# Patient Record
Sex: MALE | Race: ASIAN | NOT HISPANIC OR LATINO | Employment: UNEMPLOYED | URBAN - METROPOLITAN AREA
[De-identification: names, ages, dates, MRNs, and addresses within clinical notes are randomized per-mention and may not be internally consistent; named-entity substitution may affect disease eponyms.]

---

## 2017-03-07 ENCOUNTER — RECORDS - HEALTHEAST (OUTPATIENT)
Dept: LAB | Facility: CLINIC | Age: 47
End: 2017-03-07

## 2017-03-07 LAB
CHOLEST SERPL-MCNC: 191 MG/DL
FASTING STATUS PATIENT QL REPORTED: ABNORMAL
HDLC SERPL-MCNC: 31 MG/DL
LDLC SERPL CALC-MCNC: 94 MG/DL
LDLC SERPL CALC-MCNC: ABNORMAL MG/DL
TRIGL SERPL-MCNC: 487 MG/DL

## 2018-01-12 ASSESSMENT — MIFFLIN-ST. JEOR: SCORE: 1340.74

## 2018-01-13 ENCOUNTER — SURGERY - HEALTHEAST (OUTPATIENT)
Dept: SURGERY | Facility: CLINIC | Age: 48
End: 2018-01-13

## 2018-01-13 ENCOUNTER — ANESTHESIA - HEALTHEAST (OUTPATIENT)
Dept: SURGERY | Facility: CLINIC | Age: 48
End: 2018-01-13

## 2018-01-13 ASSESSMENT — MIFFLIN-ST. JEOR: SCORE: 1348.88

## 2018-01-14 ASSESSMENT — MIFFLIN-ST. JEOR: SCORE: 1341.19

## 2018-01-15 ASSESSMENT — MIFFLIN-ST. JEOR: SCORE: 1366.88

## 2018-01-16 ASSESSMENT — MIFFLIN-ST. JEOR: SCORE: 1384.88

## 2018-01-17 ASSESSMENT — MIFFLIN-ST. JEOR: SCORE: 1374.88

## 2018-01-18 ASSESSMENT — MIFFLIN-ST. JEOR: SCORE: 1374.31

## 2018-01-19 ASSESSMENT — MIFFLIN-ST. JEOR: SCORE: 1338.88

## 2018-01-20 ASSESSMENT — MIFFLIN-ST. JEOR: SCORE: 1318.88

## 2018-01-21 ASSESSMENT — MIFFLIN-ST. JEOR: SCORE: 1319.88

## 2018-01-22 ASSESSMENT — MIFFLIN-ST. JEOR: SCORE: 1331.67

## 2018-01-23 ENCOUNTER — HOME CARE/HOSPICE - HEALTHEAST (OUTPATIENT)
Dept: HOME HEALTH SERVICES | Facility: HOME HEALTH | Age: 48
End: 2018-01-23

## 2018-01-23 ENCOUNTER — AMBULATORY - HEALTHEAST (OUTPATIENT)
Dept: CARDIOLOGY | Facility: CLINIC | Age: 48
End: 2018-01-23

## 2018-01-23 DIAGNOSIS — T14.8XXA INTRAMURAL HEMATOMA: ICD-10-CM

## 2018-01-23 ASSESSMENT — MIFFLIN-ST. JEOR: SCORE: 1319.42

## 2018-01-25 ENCOUNTER — HOME CARE/HOSPICE - HEALTHEAST (OUTPATIENT)
Dept: HOME HEALTH SERVICES | Facility: HOME HEALTH | Age: 48
End: 2018-01-25

## 2018-02-03 ENCOUNTER — HOSPITAL ENCOUNTER (INPATIENT)
Facility: CLINIC | Age: 48
LOS: 13 days | Discharge: HOME | End: 2018-02-16
Attending: SURGERY | Admitting: SURGERY
Payer: COMMERCIAL

## 2018-02-03 ENCOUNTER — APPOINTMENT (OUTPATIENT)
Dept: GENERAL RADIOLOGY | Facility: CLINIC | Age: 48
End: 2018-02-03
Attending: SURGERY
Payer: COMMERCIAL

## 2018-02-03 DIAGNOSIS — I71.019 AORTIC DISSECTION, THORACIC (H): Primary | ICD-10-CM

## 2018-02-03 DIAGNOSIS — I71.9 PENETRATING ULCER OF AORTA (H): ICD-10-CM

## 2018-02-03 LAB
ABO + RH BLD: NORMAL
ABO + RH BLD: NORMAL
ANION GAP SERPL CALCULATED.3IONS-SCNC: 10 MMOL/L (ref 3–14)
APTT PPP: 34 SEC (ref 22–37)
BLD GP AB SCN SERPL QL: NORMAL
BLOOD BANK CMNT PATIENT-IMP: NORMAL
BUN SERPL-MCNC: 21 MG/DL (ref 7–30)
CALCIUM SERPL-MCNC: 8.8 MG/DL (ref 8.5–10.1)
CHLORIDE SERPL-SCNC: 108 MMOL/L (ref 94–109)
CO2 SERPL-SCNC: 18 MMOL/L (ref 20–32)
CREAT SERPL-MCNC: 0.65 MG/DL (ref 0.66–1.25)
ERYTHROCYTE [DISTWIDTH] IN BLOOD BY AUTOMATED COUNT: 12.3 % (ref 10–15)
GFR SERPL CREATININE-BSD FRML MDRD: >90 ML/MIN/1.7M2
GLUCOSE BLDC GLUCOMTR-MCNC: 144 MG/DL (ref 70–99)
GLUCOSE BLDC GLUCOMTR-MCNC: 155 MG/DL (ref 70–99)
GLUCOSE BLDC GLUCOMTR-MCNC: 157 MG/DL (ref 70–99)
GLUCOSE SERPL-MCNC: 152 MG/DL (ref 70–99)
HBA1C MFR BLD: 8.4 % (ref 4.3–6)
HCT VFR BLD AUTO: 36.5 % (ref 40–53)
HGB BLD-MCNC: 12 G/DL (ref 13.3–17.7)
INR PPP: 1.02 (ref 0.86–1.14)
MAGNESIUM SERPL-MCNC: 1.8 MG/DL (ref 1.6–2.3)
MCH RBC QN AUTO: 31.5 PG (ref 26.5–33)
MCHC RBC AUTO-ENTMCNC: 32.9 G/DL (ref 31.5–36.5)
MCV RBC AUTO: 96 FL (ref 78–100)
MRSA DNA SPEC QL NAA+PROBE: NEGATIVE
PHOSPHATE SERPL-MCNC: 3.4 MG/DL (ref 2.5–4.5)
PLATELET # BLD AUTO: 229 10E9/L (ref 150–450)
POTASSIUM SERPL-SCNC: 3.7 MMOL/L (ref 3.4–5.3)
RBC # BLD AUTO: 3.81 10E12/L (ref 4.4–5.9)
SODIUM SERPL-SCNC: 136 MMOL/L (ref 133–144)
SPECIMEN EXP DATE BLD: NORMAL
SPECIMEN SOURCE: NORMAL
WBC # BLD AUTO: 11.1 10E9/L (ref 4–11)

## 2018-02-03 PROCEDURE — 71045 X-RAY EXAM CHEST 1 VIEW: CPT

## 2018-02-03 PROCEDURE — 20000004 ZZH R&B ICU UMMC

## 2018-02-03 PROCEDURE — 93005 ELECTROCARDIOGRAM TRACING: CPT

## 2018-02-03 PROCEDURE — 93010 ELECTROCARDIOGRAM REPORT: CPT | Performed by: INTERNAL MEDICINE

## 2018-02-03 PROCEDURE — 86900 BLOOD TYPING SEROLOGIC ABO: CPT | Performed by: SURGERY

## 2018-02-03 PROCEDURE — 87641 MR-STAPH DNA AMP PROBE: CPT | Performed by: SURGERY

## 2018-02-03 PROCEDURE — 80048 BASIC METABOLIC PNL TOTAL CA: CPT | Performed by: SURGERY

## 2018-02-03 PROCEDURE — 25000125 ZZHC RX 250: Performed by: STUDENT IN AN ORGANIZED HEALTH CARE EDUCATION/TRAINING PROGRAM

## 2018-02-03 PROCEDURE — 83735 ASSAY OF MAGNESIUM: CPT | Performed by: SURGERY

## 2018-02-03 PROCEDURE — 36620 INSERTION CATHETER ARTERY: CPT | Mod: GC | Performed by: SURGERY

## 2018-02-03 PROCEDURE — 85610 PROTHROMBIN TIME: CPT | Performed by: SURGERY

## 2018-02-03 PROCEDURE — 25000132 ZZH RX MED GY IP 250 OP 250 PS 637: Performed by: STUDENT IN AN ORGANIZED HEALTH CARE EDUCATION/TRAINING PROGRAM

## 2018-02-03 PROCEDURE — 87640 STAPH A DNA AMP PROBE: CPT | Performed by: SURGERY

## 2018-02-03 PROCEDURE — 25000131 ZZH RX MED GY IP 250 OP 636 PS 637: Performed by: SURGERY

## 2018-02-03 PROCEDURE — 85730 THROMBOPLASTIN TIME PARTIAL: CPT | Performed by: SURGERY

## 2018-02-03 PROCEDURE — 25000125 ZZHC RX 250: Performed by: SURGERY

## 2018-02-03 PROCEDURE — 84100 ASSAY OF PHOSPHORUS: CPT | Performed by: SURGERY

## 2018-02-03 PROCEDURE — 83036 HEMOGLOBIN GLYCOSYLATED A1C: CPT | Performed by: SURGERY

## 2018-02-03 PROCEDURE — 00000146 ZZHCL STATISTIC GLUCOSE BY METER IP

## 2018-02-03 PROCEDURE — 85027 COMPLETE CBC AUTOMATED: CPT | Performed by: SURGERY

## 2018-02-03 PROCEDURE — 25800025 ZZH RX 258: Performed by: SURGERY

## 2018-02-03 PROCEDURE — 86901 BLOOD TYPING SEROLOGIC RH(D): CPT | Performed by: SURGERY

## 2018-02-03 PROCEDURE — 86850 RBC ANTIBODY SCREEN: CPT | Performed by: SURGERY

## 2018-02-03 PROCEDURE — 36415 COLL VENOUS BLD VENIPUNCTURE: CPT | Performed by: SURGERY

## 2018-02-03 PROCEDURE — 99221 1ST HOSP IP/OBS SF/LOW 40: CPT | Mod: 25 | Performed by: SURGERY

## 2018-02-03 PROCEDURE — 25000128 H RX IP 250 OP 636: Performed by: SURGERY

## 2018-02-03 PROCEDURE — 25000132 ZZH RX MED GY IP 250 OP 250 PS 637: Performed by: SURGERY

## 2018-02-03 RX ORDER — METFORMIN HCL 500 MG
2000 TABLET, EXTENDED RELEASE 24 HR ORAL
COMMUNITY
End: 2023-06-19

## 2018-02-03 RX ORDER — ONDANSETRON 2 MG/ML
4 INJECTION INTRAMUSCULAR; INTRAVENOUS EVERY 6 HOURS PRN
Status: DISCONTINUED | OUTPATIENT
Start: 2018-02-03 | End: 2018-02-16 | Stop reason: HOSPADM

## 2018-02-03 RX ORDER — POTASSIUM CHLORIDE 7.45 MG/ML
10 INJECTION INTRAVENOUS
Status: DISCONTINUED | OUTPATIENT
Start: 2018-02-03 | End: 2018-02-03

## 2018-02-03 RX ORDER — ONDANSETRON 4 MG/1
4 TABLET, ORALLY DISINTEGRATING ORAL EVERY 6 HOURS PRN
Status: DISCONTINUED | OUTPATIENT
Start: 2018-02-03 | End: 2018-02-16 | Stop reason: HOSPADM

## 2018-02-03 RX ORDER — DEXTROSE MONOHYDRATE, SODIUM CHLORIDE, AND POTASSIUM CHLORIDE 50; 1.49; 4.5 G/1000ML; G/1000ML; G/1000ML
INJECTION, SOLUTION INTRAVENOUS CONTINUOUS
Status: DISCONTINUED | OUTPATIENT
Start: 2018-02-03 | End: 2018-02-04

## 2018-02-03 RX ORDER — NICOTINE POLACRILEX 4 MG
15-30 LOZENGE BUCCAL
Status: DISCONTINUED | OUTPATIENT
Start: 2018-02-03 | End: 2018-02-16 | Stop reason: HOSPADM

## 2018-02-03 RX ORDER — POTASSIUM CHLORIDE 750 MG/1
20-40 TABLET, EXTENDED RELEASE ORAL
Status: DISCONTINUED | OUTPATIENT
Start: 2018-02-03 | End: 2018-02-16 | Stop reason: HOSPADM

## 2018-02-03 RX ORDER — DEXTROSE MONOHYDRATE 25 G/50ML
25-50 INJECTION, SOLUTION INTRAVENOUS
Status: DISCONTINUED | OUTPATIENT
Start: 2018-02-03 | End: 2018-02-16 | Stop reason: HOSPADM

## 2018-02-03 RX ORDER — ESMOLOL HYDROCHLORIDE 10 MG/ML
200 INJECTION INTRAVENOUS ONCE
Status: COMPLETED | OUTPATIENT
Start: 2018-02-03 | End: 2018-02-03

## 2018-02-03 RX ORDER — ACETAMINOPHEN 325 MG/1
650 TABLET ORAL EVERY 4 HOURS PRN
Status: DISCONTINUED | OUTPATIENT
Start: 2018-02-03 | End: 2018-02-16 | Stop reason: HOSPADM

## 2018-02-03 RX ORDER — POTASSIUM CHLORIDE 1.5 G/1.58G
20-40 POWDER, FOR SOLUTION ORAL
Status: DISCONTINUED | OUTPATIENT
Start: 2018-02-03 | End: 2018-02-16 | Stop reason: HOSPADM

## 2018-02-03 RX ORDER — MAGNESIUM SULFATE HEPTAHYDRATE 40 MG/ML
4 INJECTION, SOLUTION INTRAVENOUS EVERY 4 HOURS PRN
Status: DISCONTINUED | OUTPATIENT
Start: 2018-02-03 | End: 2018-02-16 | Stop reason: HOSPADM

## 2018-02-03 RX ORDER — POTASSIUM CL/LIDO/0.9 % NACL 10MEQ/0.1L
10 INTRAVENOUS SOLUTION, PIGGYBACK (ML) INTRAVENOUS
Status: DISCONTINUED | OUTPATIENT
Start: 2018-02-03 | End: 2018-02-16 | Stop reason: HOSPADM

## 2018-02-03 RX ORDER — NALOXONE HYDROCHLORIDE 0.4 MG/ML
.1-.4 INJECTION, SOLUTION INTRAMUSCULAR; INTRAVENOUS; SUBCUTANEOUS
Status: DISCONTINUED | OUTPATIENT
Start: 2018-02-03 | End: 2018-02-16 | Stop reason: HOSPADM

## 2018-02-03 RX ORDER — PROCHLORPERAZINE MALEATE 5 MG
10 TABLET ORAL EVERY 6 HOURS PRN
Status: DISCONTINUED | OUTPATIENT
Start: 2018-02-03 | End: 2018-02-16 | Stop reason: HOSPADM

## 2018-02-03 RX ORDER — HEPARIN SODIUM 5000 [USP'U]/.5ML
5000 INJECTION, SOLUTION INTRAVENOUS; SUBCUTANEOUS EVERY 8 HOURS
Status: DISCONTINUED | OUTPATIENT
Start: 2018-02-03 | End: 2018-02-16 | Stop reason: HOSPADM

## 2018-02-03 RX ORDER — HYDROMORPHONE HCL/0.9% NACL/PF 0.2MG/0.2
0.2 SYRINGE (ML) INTRAVENOUS
Status: DISCONTINUED | OUTPATIENT
Start: 2018-02-03 | End: 2018-02-08

## 2018-02-03 RX ORDER — PROCHLORPERAZINE 25 MG
25 SUPPOSITORY, RECTAL RECTAL EVERY 12 HOURS PRN
Status: DISCONTINUED | OUTPATIENT
Start: 2018-02-03 | End: 2018-02-16 | Stop reason: HOSPADM

## 2018-02-03 RX ORDER — AMLODIPINE BESYLATE 10 MG/1
10 TABLET ORAL DAILY
Status: DISCONTINUED | OUTPATIENT
Start: 2018-02-03 | End: 2018-02-16 | Stop reason: HOSPADM

## 2018-02-03 RX ORDER — ESMOLOL HYDROCHLORIDE 20 MG/ML
50-300 INJECTION, SOLUTION INTRAVENOUS CONTINUOUS
Status: DISCONTINUED | OUTPATIENT
Start: 2018-02-03 | End: 2018-02-04

## 2018-02-03 RX ORDER — POTASSIUM CHLORIDE 29.8 MG/ML
20 INJECTION INTRAVENOUS
Status: DISCONTINUED | OUTPATIENT
Start: 2018-02-03 | End: 2018-02-16 | Stop reason: HOSPADM

## 2018-02-03 RX ADMIN — POTASSIUM CHLORIDE, DEXTROSE MONOHYDRATE AND SODIUM CHLORIDE: 150; 5; 450 INJECTION, SOLUTION INTRAVENOUS at 09:40

## 2018-02-03 RX ADMIN — ESMOLOL HYDROCHLORIDE 300 MCG/KG/MIN: 20 INJECTION INTRAVENOUS at 20:13

## 2018-02-03 RX ADMIN — ESMOLOL HYDROCHLORIDE 50 MCG/KG/MIN: 20 INJECTION INTRAVENOUS at 08:52

## 2018-02-03 RX ADMIN — ESMOLOL HYDROCHLORIDE 300 MCG/KG/MIN: 20 INJECTION INTRAVENOUS at 22:07

## 2018-02-03 RX ADMIN — INSULIN ASPART 1 UNITS: 100 INJECTION, SOLUTION INTRAVENOUS; SUBCUTANEOUS at 15:58

## 2018-02-03 RX ADMIN — ACETAMINOPHEN 650 MG: 325 TABLET, FILM COATED ORAL at 19:44

## 2018-02-03 RX ADMIN — POTASSIUM CHLORIDE, DEXTROSE MONOHYDRATE AND SODIUM CHLORIDE: 150; 5; 450 INJECTION, SOLUTION INTRAVENOUS at 19:47

## 2018-02-03 RX ADMIN — FLUOXETINE HYDROCHLORIDE 20 MG: 10 CAPSULE ORAL at 12:01

## 2018-02-03 RX ADMIN — ESMOLOL HYDROCHLORIDE 13000 MCG: 10 INJECTION, SOLUTION INTRAVENOUS at 08:53

## 2018-02-03 RX ADMIN — ESMOLOL HYDROCHLORIDE 300 MCG/KG/MIN: 20 INJECTION INTRAVENOUS at 14:54

## 2018-02-03 RX ADMIN — DILTIAZEM HYDROCHLORIDE 15 MG/HR: 5 INJECTION INTRAVENOUS at 19:47

## 2018-02-03 RX ADMIN — Medication 0.2 MG: at 15:55

## 2018-02-03 RX ADMIN — INSULIN ASPART 1 UNITS: 100 INJECTION, SOLUTION INTRAVENOUS; SUBCUTANEOUS at 12:01

## 2018-02-03 RX ADMIN — ESMOLOL HYDROCHLORIDE 300 MCG/KG/MIN: 20 INJECTION INTRAVENOUS at 18:35

## 2018-02-03 RX ADMIN — Medication 2 G: at 13:49

## 2018-02-03 RX ADMIN — DILTIAZEM HYDROCHLORIDE 5 MG/HR: 5 INJECTION INTRAVENOUS at 11:52

## 2018-02-03 RX ADMIN — AMLODIPINE BESYLATE 10 MG: 10 TABLET ORAL at 09:26

## 2018-02-03 RX ADMIN — ESMOLOL HYDROCHLORIDE 300 MCG/KG/MIN: 20 INJECTION INTRAVENOUS at 17:11

## 2018-02-03 RX ADMIN — Medication 0.2 MG: at 12:08

## 2018-02-03 RX ADMIN — POTASSIUM CHLORIDE 20 MEQ: 750 TABLET, EXTENDED RELEASE ORAL at 13:47

## 2018-02-03 RX ADMIN — Medication 0.2 MG: at 09:26

## 2018-02-03 RX ADMIN — ESMOLOL HYDROCHLORIDE 300 MCG/KG/MIN: 20 INJECTION INTRAVENOUS at 13:23

## 2018-02-03 RX ADMIN — ESMOLOL HYDROCHLORIDE 300 MCG/KG/MIN: 20 INJECTION INTRAVENOUS at 11:23

## 2018-02-03 RX ADMIN — INSULIN ASPART 1 UNITS: 100 INJECTION, SOLUTION INTRAVENOUS; SUBCUTANEOUS at 19:44

## 2018-02-03 RX ADMIN — HEPARIN SODIUM 5000 UNITS: 5000 INJECTION, SOLUTION INTRAVENOUS; SUBCUTANEOUS at 21:44

## 2018-02-03 RX ADMIN — HEPARIN SODIUM 5000 UNITS: 5000 INJECTION, SOLUTION INTRAVENOUS; SUBCUTANEOUS at 13:47

## 2018-02-03 ASSESSMENT — PAIN DESCRIPTION - DESCRIPTORS
DESCRIPTORS: ACHING
DESCRIPTORS: ACHING

## 2018-02-03 NOTE — PROGRESS NOTES
D/I:?Patient on unit 4A Surgical/Neuro ICU admit from [facility]   Neuro- a&Ox4; PERRLA; no deficits noted. Spontaneous & purposeful movement to all extrems. On strict bedrest at this time.   CV- SR 60s-70s; titrating diltiazem & esmolol to maintain SBP <110 & HR <60- per orders-per vascular note goal SBP <120 & HR in 60-70 range  Pulm- lungs CTA on RA; denies SOB @ rest  GI- NPO-sips w/ meds; abdomen somewhat distended; mild tenderness  - continent; adequate UOP;   Gtts- dilt gtt; esmolol gtt; dex 5% w/ 1/2 NS 20 K @ 100 ml/hr  Skin- intact  Lines- R-PIV; L-PIV; R radial ART line  Drains- N/A  Pain- continues to c/o CP; mild abdominal pain; EKG WNL; prn IV dilaudid appears effective; prn oral tylenol available  See flow sheets for further interventions and assessments.   A: Stable. Electros replaced per protocol; no acute changes since pt arrival. SBPs mostly 100s-125; HR 60-70s. Pt oriented to room and POC. No surgery at this time  P:?Continue to monitor pt closely. Notify MD of significant changes. Continue to monitor BP & HR. Possibly advance diet tomorrow to CL?  If BP & HR remains controlled.  to come at 8 A.M. tomorrow to assist w/ admission questions.

## 2018-02-03 NOTE — PROGRESS NOTES
Pt arrived from [facility] d/t worsening type B thoracic aortic dissection. Primarily Hmong speaking. Does speak some english.   called and at bedside. Plan of care explained to pt. No surgery at this time-initiating BP control SBP <110 and HR <60. Pt has no questions regarding POC at this time. Pt arrived w/ underwear and songs. No other belongings on pt. EKG WNL. CXray ordered. C/O chest/abdominal pain.

## 2018-02-03 NOTE — PHARMACY-CONSULT NOTE
Admission medication history interview status for the 2/3/2018 admission is complete. See Epic admission navigator for allergy information, pharmacy, prior to admission medications and immunization status.     Medication history interview sources:  Care Everywhere medications and records, [facility] [phone]    Changes made to PTA medication list (reason)  Added: all in list below    Additional medication history information (including reliability of information, actions taken by pharmacist):    The patient reported he did not know his medications but he fills at [facility] in [facility].  Based on a review of his CareEverywhere he was discharged from [facility] after an admission 1/12/18-1/23/18 for aortic dissection with hypertensive emergency.  Medication compliance was questioned at that time.    He was discharged on:  Labetalol 600 mg po TID  Amlodipine 10 mg po TID  Lisinopril 20 mg BID  HCTZ 25 mg daily  Metformin ER 1500 mg daily    His pharmacy shows records for:  Fluoxetine 20 mg daily - filled 1/29/18 for a 30 day supply  Atenolol 50 mg daily - filled 11/20/17 for a 30 day supply ---> NO record for a fill of the new Rx for labetalol  NO record for a fill of the new Rx for amlodipine  Lisinopril 40 mg daily  HCTZ 12.5 mg daily  Metformin ER 2000 mg daily    PTA medication list updated to reflect what he should have been taking at discharge on 1/23/18 with no adjustments made to the metformin dose and the new Rx for fluoxetine added.      Prior to Admission medications    Medication Sig Last Dose Taking? Auth Provider   FLUOXETINE HCL PO Take 20 mg by mouth daily  Yes Unknown, Entered By History   LISINOPRIL PO Take 20 mg by mouth 2 times daily  Yes Unknown, Entered By History   HYDROCHLOROTHIAZIDE PO Take 25 mg by mouth daily  Yes Unknown, Entered By History   metFORMIN (GLUCOPHAGE-XR) 500 MG 24 hr tablet Take 2,000 mg by mouth daily (with dinner)  Yes Unknown, Entered By History   LABETALOL  HCL PO Take 600 mg by mouth 3 times daily  at Never started at home Yes Unknown, Entered By History   AMLODIPINE BESYLATE PO Take 10 mg by mouth daily  at Never started at home Yes Unknown, Entered By History         Medication history completed by:   Svetlana Coon, PharmD  pager [phone]

## 2018-02-03 NOTE — IP AVS SNAPSHOT
[address] [address]    Phone:  [phone]                                       After Visit Summary   2/3/2018    Tiffany Lenz    MRN: [id number]           After Visit Summary Signature Page     I have received my discharge instructions, and my questions have been answered. I have discussed any challenges I see with this plan with the nurse or doctor.    ..........................................................................................................................................  Patient/Patient Representative Signature      ..........................................................................................................................................  Patient Representative Print Name and Relationship to Patient    ..................................................               ................................................  Date                                            Time    ..........................................................................................................................................  Reviewed by Signature/Title    ...................................................              ..............................................  Date                                                            Time

## 2018-02-03 NOTE — IP AVS SNAPSHOT
MRN:[id number]                      After Visit Summary   2/3/2018    Tiffany Lenz    MRN: [id number]           Thank you!     Thank you for choosing [facility] for your care. Our goal is always to provide you with excellent care. Hearing back from our patients is one way we can continue to improve our services. Please take a few minutes to complete the written survey that you may receive in the mail after you visit with us. Thank you!        Patient Information     Date Of Birth          1970        Designated Caregiver       Most Recent Value    Caregiver    Will someone help with your care after discharge? 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  For non-urgent questions about your medical care, please call your primary care provider or clinic, [phone]  For questions related to your surgery, please call your surgery clinic        Attending Provider     Provider Derrick Rausch MD Vascular Surgery    Monisha Monique MD Vascular Surgery       Primary Care Provider Office Phone # Fax #    Lindsey Ames [phone]       When to contact your care team       Please keep record of your blood pressure.  Systolic blood pressure goal (top number) under 120.  Heart rate goal 60.  Please check blood pressure and heart rate twice a day            When to contact your care team       Contact Dr. Monique's vascular surgery team if any increased pain, swelling, fever or incision drainage develop.                  After Care Instructions     Activity       Your activity upon discharge: no heavy lifting for 2 weeks nothing over 10 lbs            Diet        Follow this diet upon discharge:      Regular Diet Adult            Wound care and dressings       Instructions to care for your wound at home: okay to keep bilateral groin incisions open to air                  Follow-up Appointments     Follow Up and recommended labs and tests       Follow up with Dr. Monique in one month with CT scan    With questions, concerns, or to request an appointment, please call either:    Melissa Warren, Care Coordinator RN, Vascular Surgery  [phone]    Vascular Call Center  [phone]    To contact someone after 5 pm, on a weekend, or on a Holiday, please call:  [facility]  [phone], option 4 to have a member of the Vascular Surgery Service paged.                  Your next 10 appointments already scheduled     Mar 22, 2018  1:00 PM CDT   (Arrive by 12:45 PM)   CT CHEST/ABD/PELVIS ANGIO W PROCESSING with UUCT4   [facility] [place], [place] ([facility], [facility])    [address] [address] [phone]           Please bring any scans or X-rays taken at other hospitals, if similar tests were done. Also bring a list of your medicines, including vitamins, minerals and over-the-counter drugs. It is safest to leave personal items at home.  Be sure to tell your doctor:   If you have any allergies.   If there s any chance you are pregnant.   If you are breastfeeding.    If you have diabetes as your medication may need to be adjusted for this exam.  You will have contrast for this exam. To prepare:   Do not eat or drink for 2 hours before your exam. If you need to take medicine, you may take it with small sips of water. (We may ask you to take liquid medicine as well.)   The day before your exam, drink extra fluids at least six 8-ounce glasses (unless your doctor tells you to restrict your fluids).  Patients over 70 or patients with diabetes or kidney problems:   If you haven t had a blood test  "(creatinine test) within the last 30 days, the Cardiologist/Radiologist may require you to get this test prior to your exam.  Please wear loose clothing, such as a sweat suit or jogging clothes. Avoid snaps, zippers and other metal. We may ask you to undress and put on a hospital gown.  If you have any questions, please call the Imaging Department where you will have your exam.            Mar 22, 2018  2:00 PM CDT   (Arrive by 1:45 PM)   New Vascular Visit with Monisha Monique MD   [facility] ([facility])    [address] [place] [address]  3rd [facility] [address] [phone]              Additional Services     CARDIAC REHAB REFERRAL       *This therapy referral will be filtered to a centralized scheduling office at [facility] and the patient will receive a call to schedule an appointment at a [place] location most convenient for them.*     If you have not heard from the scheduling office within 2 business days, please call [phone] for all locations.    Please be aware that coverage of these services is subject to the terms and limitations of your health insurance plan.  Call member services at your health plan with any benefit or coverage questions.      **Note to Provider:  If you are referring outside of [place] for the therapy appointment, please list the name of the location in the \"special instructions\" above, print the referral and give to the patient to schedule the appointment.                         Future tests that were ordered for you     CTA Angiogram Chest/Abd/Pelvis w Processing       Status post TEVAR for penetrating aortic ulcer                  Pending Results     No orders found from 2/1/2018 to 2/4/2018.            Statement of Approval     Ordered          02/15/18 0910  I have reviewed and agree with all the recommendations and orders detailed in this document.  EFFECTIVE NOW     Approved and electronically signed by:  " "Holly Howard, BYRON CNS             Admission Information     Date & Time Provider Department Dept. Phone    2/3/2018 Monisha Monique MD [address] [facility] [phone]      Your Vitals Were     Blood Pressure Pulse Temperature Respirations Height Weight    110/60 80 95.8  F (35.4  C) (Axillary) 16 1.402 m (4' 7.2\") 53 kg (116 lb 12.8 oz)    Pulse Oximetry BMI (Body Mass Index)                97% 26.95 kg/m2          Updater Information     Updater lets you send messages to your doctor, view your test results, renew your prescriptions, schedule appointments and more. To sign up, go to [email or web address] . Click on \"Log in\" on the left side of the screen, which will take you to the Welcome page. Then click on \"Sign up Now\" on the right side of the page.     You will be asked to enter the access code listed below, as well as some personal information. Please follow the directions to create your username and password.     Your access code is: [email or web address]  Expires: 2018 11:05 AM     Your access code will  in 90 days. If you need help or a new code, please call your [place] clinic or [phone].        Care EveryWhere ID     This is your Care EveryWhere ID. This could be used by other organizations to access your [place] medical records  [id number]        Equal Access to Services     MARLY SIDDIQI AH: Hadii everett Farias, waaxda luqadaha, qaybta kaalmada johnathanyada, leonidas hatch. So [facility] [phone].    ATENCIÓN: Si habla español, tiene a carroll disposición servicios gratuitos de asistencia lingüística. Llame al [phone].    We comply with applicable federal civil rights laws and [place] laws. We do not discriminate on the basis of race, color, national origin, age, disability, sex, sexual orientation, or gender identity.               Review of your medicines      START taking        Dose / Directions    diltiazem 240 MG 24 hr capsule        Dose:  " 240 mg   Take 1 capsule (240 mg) by mouth daily   Quantity:  30 capsule   Refills:  0         CONTINUE these medicines which may have CHANGED, or have new prescriptions. If we are uncertain of the size of tablets/capsules you have at home, strength may be listed as something that might have changed.        Dose / Directions    lisinopril 10 MG tablet   Commonly known as:  PRINIVIL/ZESTRIL   This may have changed:    - medication strength  - how much to take        Dose:  10 mg   Take 1 tablet (10 mg) by mouth 2 times daily   Quantity:  30 tablet   Refills:  0         CONTINUE these medicines which have NOT CHANGED        Dose / Directions    AMLODIPINE BESYLATE PO        Dose:  10 mg   Take 10 mg by mouth daily   Refills:  0       FLUOXETINE HCL PO        Dose:  20 mg   Take 20 mg by mouth daily   Refills:  0       HYDROCHLOROTHIAZIDE PO        Dose:  25 mg   Take 25 mg by mouth daily   Refills:  0       LABETALOL HCL PO        Dose:  600 mg   Take 600 mg by mouth 3 times daily   Refills:  0       metFORMIN 500 MG 24 hr tablet   Commonly known as:  GLUCOPHAGE-XR        Dose:  2000 mg   Take 2,000 mg by mouth daily (with dinner)   Refills:  0            Where to get your medicines      These medications were sent to [facility] [place], [place] - [address] [place] [address]  [address] [facility] [address]     Phone:  [phone]     diltiazem 240 MG 24 hr capsule    lisinopril 10 MG tablet                Protect others around you: Learn how to safely use, store and throw away your medicines at www.disposemymeds.org.             Medication List: This is a list of all your medications and when to take them. Check marks below indicate your daily home schedule. Keep this list as a reference.      Medications           Morning Afternoon Evening Bedtime As Needed    AMLODIPINE BESYLATE PO   Take 10 mg by mouth daily   Last time this was given:  10 mg on 2/15/2018  8:43 AM                                 diltiazem 240 MG 24 hr capsule   Take 1 capsule (240 mg) by mouth daily   Last time this was given:  240 mg on 2/15/2018  8:43 AM                                FLUOXETINE HCL PO   Take 20 mg by mouth daily   Last time this was given:  20 mg on 2/15/2018  8:31 AM                                HYDROCHLOROTHIAZIDE PO   Take 25 mg by mouth daily   Last time this was given:  25 mg on 2/15/2018  8:31 AM                                LABETALOL HCL PO   Take 600 mg by mouth 3 times daily   Last time this was given:  600 mg on 2/15/2018  1:43 PM                                lisinopril 10 MG tablet   Commonly known as:  PRINIVIL/ZESTRIL   Take 1 tablet (10 mg) by mouth 2 times daily   Last time this was given:  10 mg on 2/15/2018  8:31 AM                                metFORMIN 500 MG 24 hr tablet   Commonly known as:  GLUCOPHAGE-XR   Take 2,000 mg by mouth daily (with dinner)

## 2018-02-03 NOTE — H&P
VASCULAR SURGERY STAFF ADMIT NOTE      Date of Service: 2/3/2018      Patient Summary:  Tiffany Lenz is a 47 year old year old transferred to [facility] from [facility] worsening type B thoracic aortic dissection      ASSESSMENT AND RECOMMENDATIONS:   47-year-old gentleman with worsening type B thoracic aortic dissection / IMH  By CT scans over the last 2-3 weeks, there has been some worsening of the dissection with some aneurysmal change and worsening penetrating atheromatous ulcer.  Prior to admission, unclear whether patient was on adequate medical therapy for his type B dissection.  On Physical exam, no evidence of malperfusion, Pain currently resolved on esmolol drip  Admission to ICU, IV beta blocker for anti-impulse blood pressure control. Goal systolic blood pressure 120 or under with heart rate in the 60-70 range  We'll need outside CT scans push to our radiology system for further review       CHIEF COMPLAINT:    worsening type B thoracic aortic dissection    HISTORY OF PRESENT ILLNESS:    Tiffany Lenz is a 47 year old year old male transferred to [facility] from [facility] worsening type B thoracic aortic dissection. Review of imaging from outside hospital shows CT scan of the chest abdomen and pelvis dated 1/13/2018 performed for chest back and abdominal pain. This scan showed a thoracic aortic dissection with a thrombosed false lumen extending from left subclavian artery through the abdominal aorta. There are multiple CT scans from the same institution dated 1/15/2018, 1/22/2018, 1/25/2018, an 2/3/2018. By report, patient was recommended for transfer and admission to [facility] during previous admission but the patient refused. Patient returned to the emergency department at [facility] today. Today's CT scan was concerning for evolving and increasing acute penetrating atheromatous ulcer with increasing type B aortic dissection as well as increasing aneurysmal dilatation of the thoracic aorta. By  report this was associated with continued symptoms of chest and abdominal pain. At this point, patient is agreeable to transfer to [facility]. Patient admitted to ICU.    On further history, patient poor historian in regards to BP control as well as blood sugar control    Reports DM on oral agents           Medical/Surgical History :    No past medical history on file.  No past surgical history on file.      Psychosocial History:  Social History     Social History     Marital status: Single     Spouse name: N/A     Number of children: N/A     Years of education: N/A     Occupational History     Not on file.     Social History Main Topics     Smoking status: Not on file     Smokeless tobacco: Not on file     Alcohol use Not on file     Drug use: Not on file     Sexual activity: Not on file     Other Topics Concern     Not on file     Social History Narrative         Family History:  No family history on file.      CURRENT HEALTH STATUS    Medications:     Current Facility-Administered Medications   Medication     esmolol 2000 mg in sodium chloride 0.9% 100 mL infusion     naloxone (NARCAN) injection 0.1-0.4 mg     dextrose 5% and 0.45% NaCl + KCl 20 mEq/L infusion     acetaminophen (TYLENOL) tablet 650 mg    Or     acetaminophen (TYLENOL) solution 650 mg     HYDROmorphone (DILAUDID) injection 0.2 mg     ondansetron (ZOFRAN-ODT) ODT tab 4 mg    Or     ondansetron (ZOFRAN) injection 4 mg     prochlorperazine (COMPAZINE) injection 10 mg    Or     prochlorperazine (COMPAZINE) tablet 10 mg    Or     prochlorperazine (COMPAZINE) Suppository 25 mg     glucose 40 % gel 15-30 g    Or     dextrose 50 % injection 25-50 mL    Or     glucagon injection 1 mg     insulin aspart (NovoLOG) inj (RAPID ACTING)     amLODIPine (NORVASC) tablet 10 mg     diltiazem (CARDIZEM) 125 mg in NaCl 0.9 % 125 mL infusion     FLUoxetine (PROzac) capsule 20 mg     heparin sodium PF injection 5,000 Units                    Allergies and drug reactions:       "            No Known Allergies      REVIEW OF SYSTEMS:  Review of Systems   Negative except for smoking, DM and current dissection sx      PHYSICAL EXAMINATIONS:  /83  Pulse 78  Temp 98.2  F (36.8  C) (Oral)  Ht 1.448 m (4' 9\")  Wt 64.7 kg (142 lb 10.2 oz)  SpO2 95%  BMI 30.87 kg/m2      General: WNWD in NAD    HEENT: PERRL, EOMI, orpharynx clear    Neck: Supple, Bruits: None    Respiratory: Clear to Auscutation     Cardiac: RRR, S1S2    Gastrointestinal: Soft, Non distended, non tender. Aorta not/ palpable. Some back pain from upper back to mid back    Peripheral vascular:     Right      Left  Radial  2+     2+  Femoral 2+     2+  DP  2+     2+   PT  2+     2+        Extremities:   Right leg: no wounds  Left Leg: no wounds      Neurological: CN 2-12 intact, TORRES, Grossly non focal      REVIEW OF LABORATORY, PATHOLOGY, AND RADIOLOGY DATA:    Lab results:     Recent Labs   Lab Test  02/03/18   0908   NA  136   CHLORIDE  108   CO2  18*   CR  0.65*     Recent Labs   Lab Test  02/03/18   0908   HGB  12.0*   WBC  11.1*   PLT  229     @RESULREFR(pt,aptt,inr)@         X-ray results: CTA from OSH today personally reviewed        ECG results:NSR    Other:  none        Primary care physician: (automatically links in the PCP listed in patient record)    Other significant physician provider(s): Lindsey Ames MD  Vascular Surgery Staff  "

## 2018-02-03 NOTE — LETTER
Transition Communication Hand-off for Care Transitions to Next Level of Care Provider    Name: Tiffany Lenz  MRN #: [id number]  Primary Care Provider: Lindsey Ames     Primary Clinic: [facility] [address] [place] [facility] [address]     Reason for Hospitalization:  Hx of repair of dissecting thoracic aortic aneurysm, Flanagan type B [Z98.890, Z86.79]  Admit Date/Time: 2/3/2018  8:21 AM  Discharge Date: 2/16/2018  Payor Source: Payor: BLUE PLUS / Plan: BLUE PLUS MA / Product Type: HMO /          Reason for Communication Hand-off Referral: Other continuity of care    Discharge Plan: Discharged to home with family assist and plan for clinic f/u     Discharge Needs Assessment:  Needs       Most Recent Value    Equipment Currently Used at Home none    Transportation Available taxi        Follow-up plan:  Future Appointments  Date Time Provider Department Center   2/17/2018 8:00 AM [place] LANGU[facility]   2/18/2018 8:00 AM [place] [facility]   2/19/2018 8:00 AM Milady Sorenson [facility]   2/20/2018 8:00 AM [facility]   2/21/2018 8:00 AM Milady Sorenson [facility]   2/27/2018 9:30 AM 1, Ur Cardiac Re[facility]   3/22/2018 1:00 PM [id number] [place] [address]   3/22/2018 2:00 PM Monisha Monique MD [facility]       Any outstanding tests or procedures:        Radiology & Cardiology Orders     Future Labs/Procedures Complete By Expires    CTA Angiogram Chest/Abd/Pelvis w Processing  2/15/2018 (Approximate) 2/15/2019    Process Instructions:    Administration of IV contrast (contrast agent, dose, and amount) will be tailored to this examination per the appropriate written protocol listed in the Protocol E-Book, or by the supervising imaging provider.     Comments:    Status post TEVAR for penetrating aortic ulcer        Referrals     Future Labs/Procedures    CARDIAC REHAB REFERRAL     Comments:    *This therapy  "referral will be filtered to a centralized scheduling office at [facility] and the patient will receive a call to schedule an appointment at a [facility] location most convenient for them.*     If you have not heard from the scheduling office within 2 business days, please call [phone] for all locations.    Please be aware that coverage of these services is subject to the terms and limitations of your health insurance plan.  Call member services at your health plan with any benefit or coverage questions.      **Note to Provider:  If you are referring outside of [facility] for the therapy appointment, please list the name of the location in the \"special instructions\" above, print the referral and give to the patient to schedule the appointment.                  Pallavi Lepe, NEELCC  [phone]    AVS/Discharge Summary is the source of truth; this is a helpful guide for improved communication of patient story          "

## 2018-02-03 NOTE — PROCEDURES
Arterial Line Procedure Note    DATE OF PROCEDURE: 2/3/2018    PREOPERATIVE DIAGNOSIS: Descending thoracic aneurysm     POSTOPERATIVE DIAGNOSIS: Same    OPERATION PERFORMED: radial line placement    ANESTHESIA: Local    COMPLICATIONS: none    INDICATIONS FOR PROCEDURE:   The patient is a 47 year old male with type B aortic aneurysm, who requires an arterial line for invasive BP monitoring.    DESCRIPTION OF OPERATIVE PROCEDURE:   Pre-procedure consent for the procedure was obtained.  The patient was prepped and draped in the usual sterile manner. An Jona's test was performed to confirm adequate collateral blood flow.  Lidocaine was used to anesthetize the region. The right radial artery was palpated and successfully cannulated on the first pass. Pulsatile, arterial blood was visualized and the artery was then threaded with a guidewire using the Seldinger technique and a catheter was then inserted sutured into place. A good wave-form was obtained. The patient tolerated the procedure well without any immediate complications. The area was cleaned and sterile dressing was applied. Dr Figueroa is the supervising surgeon and was available for assistance.    DISPOSITION: stable, OK to use arterial line    Lainey Haro MD  General Surgery Resident

## 2018-02-03 NOTE — H&P
"       SURGICAL ICU H&P  February 3, 2018      CO-MORBIDITIES:   No diagnosis found.    ASSESSMENT/Plan:   Tiffany Lenz is a 47 year old male with history of HTN and known tupe B aortic dissection who is admitted with worsening aortic dissection.     Neuro/ pain/ sedation:  - Neuro intact currently, will continue to monitor neurological status. Notify the MD for any acute changes in exam.  - tylenol PRN, dilaudid PRN for pain.      Pulmonary care:   - Currently without work of breathing, with normal O2 saturation. Supplemental oxygen to keep saturation above 92 %.    Cardiovascular:    -Type B thoracic aortic dissection: vascular surgery primary. Per outside report: \"acute penetrating atheromatous ulcer with increasing type B aortic dissection and intramural hematoma as well as associated increasing 4.4 cm aneurysmal dilation of the thoracic aorta. Also supraceliac penetrating atheromatous ulcer with associated intramural hematoma and 4.1 x 3.8 cm infrarenal abdominal aortic aneurysm\"  Will use esmolol infusion keep BP less than 110, HR less than 60.   Will add diltiaziam drip to achieve goal SBP and HR     GI care: No issues. Will keep NPO for now. May start CLD later today if SBP is better controlled     Fluids/ Electrolytes/ Nutrition:   - will start D5 1/2 NS + 20 KCL at 100 while NPO  - Monitor electrolytes and replete as needed.   - No indication for parenteral nutrition.    Renal/ Fluid Balance:    - Will continue to monitor intake and output.    Endocrine:    - Sliding scale for diabetes management.   -  Will switch to insulin gtt if unable to achieve good control     ID/ Antibiotics:  - No indication for antibiotics.    Heme:     - Hemoglobin stable.     Prophylaxis:    - Mechanical prophylaxis for DVT.   - Hep SC TID    Lines/ tubes/ drains:  - PIVx2  - Rt radial art line    Disposition:  - Surgical ICU.     Patient seen, findings and plan discussed with surgical ICU staff Henry.    Lainey Haro MD  General " Surgery, PGY-2  [phone]      - - - - - - - - - - - - - - - - - - - - - - - - - - - - - - - - - - - - - - - - - - - - - - - - - - - - - - - - - - - - - - - - - - - - - - - -     PRIMARY TEAM: Vascular Surgery  PRIMARY PHYSICIAN: Dr. Ziegler    REASON FOR CRITICAL CARE ADMISSION: Close hemodynamic monitoring      ADMITTING PHYSICIAN: Dr. Ziegler     HISTORY PRESENTING ILLNESS:  Tiffany Lenz is a 47 year old year old male transferred to [facility] from [facility] worsening type B thoracic aortic dissection. Review of imaging from outside hospital shows CT scan of the chest abdomen and pelvis dated 1/13/2018 performed for chest back and abdominal pain. This scan showed a thoracic aortic dissection with a thrombosed false lumen extending from left subclavian artery through the abdominal aorta. Patient was advised to come to [facility] for further cares but he refused. He presented back to the ED with worsening chest pain radiating to the back. Repeat CT scan today was concerning for evolving and increasing acute penetrating atheromatous ulcer with increasing type B aortic dissection as well as increasing aneurysmal dilatation of the thoracic aorta. Patient denies lower extremities weakness or numbness, no abdominal pain, UOP has been unchanged.     REVIEW OF SYSTEMS: As noted above.     PAST MEDICAL HISTORY:   HTN  DMII     SURGICAL HISTORY:   None    SOCIAL HISTORY: Tobacco - smoking since 1991. Unemployed.     FAMILY HISTORY: Noncontributory     ALLERGIES:    NKDA    MEDICATIONS:    No current facility-administered medications on file prior to encounter.   No current outpatient prescriptions on file prior to encounter.    PHYSICAL EXAMINATION:  Temp:  [98.1  F (36.7  C)] 98.1  F (36.7  C)  Pulse:  [78] 78  BP: (134-136)/(85-91) 136/91  General: Alert, well-appearing in no acute distress.  HEENT: Normocephalic, atraumatic. Patent nares.   Respiratory: Non-labored breathing. Lung sounds clear to auscultation bilaterally.    Cardiovascular: Regular rate and rhythm. No murmurs.   Gastrointestinal: Abdomen soft, non-distended, non-tender to palpation.   Extremities: Moving all four extremities. No limb deformities. No pedal edema. Palpable DP pulses bilaterally.   Skin: As noted above. No rashes or lesions appreciated.    LABS: Reviewed.   Complete Blood Count     Recent Labs  Lab 02/03/18  0908   WBC 11.1*   HGB 12.0*        Basic Metabolic Panel    Recent Labs  Lab 02/03/18  0908      POTASSIUM 3.7   CHLORIDE 108   CO2 18*   BUN 21   CR 0.65*   *     Liver Function Tests    Recent Labs  Lab 02/03/18  0908   INR 1.02   Coagulation Profile    Recent Labs  Lab 02/03/18  0908   INR 1.02   PTT 34       IMAGING:  CTA Chest Abdomen Pelvis2/3/2018  [facility]  Result Impression   CONCLUSION:  1.  Evolving, increasing acute penetrating atheromatous ulcer with increasing type B aortic dissection and intramural hematoma as well as associated increasing 4.4 cm aneurysmal dilation of the thoracic aorta.  2.  Supraceliac penetrating atheromatous ulcer with associated intramural hematoma, decreased in prominence/size in comparison with prior study.  3.  4.1 x 3.8 cm infrarenal abdominal aortic aneurysm.  4.  Mildly enlarged heart.  5.  Renal cysts.    Findings were discussed with Dr. Campos at 6:21 AM on 02/03/2018.   Result Narrative   CTA CHEST ABDOMEN PELVIS  02/03/2018, 5:48 AM    INDICATION: Dissection and aneurysm history. Evaluate for aortic dissection.  TECHNIQUE: Multiphase helical acquisition through the chest, abdomen, and pelvis was performed including noncontrast, arterial phase, and delayed images before and after the administration of IV contrast. 2D and 3D reconstructions were performed by the   CT technologist. Dose reduction techniques were used.   IV CONTRAST: Iohexol (Omni) 100 mL.  COMPARISON: 01/25/2018.    FINDINGS:  CT ANGIOGRAM CHEST, ABDOMEN, AND PELVIS: Type B aortic dissection with  increasing penetrating atheromatous ulcer. The atheromatous ulcer is seen at the level of the mid thoracic artery and now measures approximately 2.2 x 1.4 x 4.4 cm. The aortic   dissection flap and thrombus appears to be increasing as well. The mid thoracic descending aorta now measures 4.4 cm in length compared to 3.8 cm.    Decreased supraceliac penetrating ulcer with associated mural hematoma (3, 113). Consider correlation for underlying vasculitis/connective tissue disease.    Infrarenal abdominal aortic aneurysm measuring 3.8 x 4.1 cm.    Patent celiac, superior mesenteric, renal, and inferior mesenteric arteries.    CHEST:  LUNGS AND PLEURA: Dependent atelectasis.    MEDIASTINUM: Normal size heart. Pericardial effusion. No hilar or mediastinal lymphadenopathy.    ABDOMEN: Fatty liver. Normal adrenals, spleen, and pancreas. Small renal cysts. No hydroureteronephrosis. No obstructing renal or ureteral stone.     PELVIS: Normal bowel. Normal appendix.    MUSCULOSKELETAL: Negative.

## 2018-02-04 LAB
BUN SERPL-MCNC: 10 MG/DL (ref 7–30)
CALCIUM SERPL-MCNC: 7.8 MG/DL (ref 8.5–10.1)
CHLORIDE SERPL-SCNC: 110 MMOL/L (ref 94–109)
CO2 SERPL-SCNC: 16 MMOL/L (ref 20–32)
CREAT SERPL-MCNC: 0.73 MG/DL (ref 0.66–1.25)
GFR SERPL CREATININE-BSD FRML MDRD: >90 ML/MIN/1.7M2
GLUCOSE BLDC GLUCOMTR-MCNC: 121 MG/DL (ref 70–99)
GLUCOSE BLDC GLUCOMTR-MCNC: 151 MG/DL (ref 70–99)
GLUCOSE BLDC GLUCOMTR-MCNC: 155 MG/DL (ref 70–99)
GLUCOSE BLDC GLUCOMTR-MCNC: 161 MG/DL (ref 70–99)
GLUCOSE BLDC GLUCOMTR-MCNC: 166 MG/DL (ref 70–99)
GLUCOSE BLDC GLUCOMTR-MCNC: 192 MG/DL (ref 70–99)
GLUCOSE SERPL-MCNC: 172 MG/DL (ref 70–99)
MAGNESIUM SERPL-MCNC: 2.1 MG/DL (ref 1.6–2.3)
POTASSIUM SERPL-SCNC: 4.4 MMOL/L (ref 3.4–5.3)
SODIUM SERPL-SCNC: 138 MMOL/L (ref 133–144)

## 2018-02-04 PROCEDURE — 25000125 ZZHC RX 250: Performed by: SURGERY

## 2018-02-04 PROCEDURE — 40000275 ZZH STATISTIC RCP TIME EA 10 MIN

## 2018-02-04 PROCEDURE — 25000132 ZZH RX MED GY IP 250 OP 250 PS 637: Performed by: PHYSICIAN ASSISTANT

## 2018-02-04 PROCEDURE — 80048 BASIC METABOLIC PNL TOTAL CA: CPT | Performed by: STUDENT IN AN ORGANIZED HEALTH CARE EDUCATION/TRAINING PROGRAM

## 2018-02-04 PROCEDURE — 99291 CRITICAL CARE FIRST HOUR: CPT | Performed by: PHYSICIAN ASSISTANT

## 2018-02-04 PROCEDURE — 25000128 H RX IP 250 OP 636: Performed by: SURGERY

## 2018-02-04 PROCEDURE — 25000128 H RX IP 250 OP 636: Performed by: PHYSICIAN ASSISTANT

## 2018-02-04 PROCEDURE — 40000014 ZZH STATISTIC ARTERIAL MONITORING DAILY

## 2018-02-04 PROCEDURE — 25000132 ZZH RX MED GY IP 250 OP 250 PS 637: Performed by: SURGERY

## 2018-02-04 PROCEDURE — 25800025 ZZH RX 258: Performed by: SURGERY

## 2018-02-04 PROCEDURE — 83735 ASSAY OF MAGNESIUM: CPT | Performed by: STUDENT IN AN ORGANIZED HEALTH CARE EDUCATION/TRAINING PROGRAM

## 2018-02-04 PROCEDURE — 00000146 ZZHCL STATISTIC GLUCOSE BY METER IP

## 2018-02-04 PROCEDURE — 20000004 ZZH R&B ICU UMMC

## 2018-02-04 PROCEDURE — 25000125 ZZHC RX 250: Performed by: PHYSICIAN ASSISTANT

## 2018-02-04 RX ORDER — AMOXICILLIN 250 MG
1 CAPSULE ORAL 2 TIMES DAILY
Status: DISCONTINUED | OUTPATIENT
Start: 2018-02-04 | End: 2018-02-16 | Stop reason: HOSPADM

## 2018-02-04 RX ORDER — LABETALOL HYDROCHLORIDE 300 MG/1
150 TABLET, FILM COATED ORAL EVERY 12 HOURS SCHEDULED
Status: DISCONTINUED | OUTPATIENT
Start: 2018-02-04 | End: 2018-02-05

## 2018-02-04 RX ORDER — FUROSEMIDE 10 MG/ML
20 INJECTION INTRAMUSCULAR; INTRAVENOUS ONCE
Status: COMPLETED | OUTPATIENT
Start: 2018-02-04 | End: 2018-02-04

## 2018-02-04 RX ORDER — POLYETHYLENE GLYCOL 3350 17 G/17G
17 POWDER, FOR SOLUTION ORAL DAILY
Status: DISCONTINUED | OUTPATIENT
Start: 2018-02-04 | End: 2018-02-16 | Stop reason: HOSPADM

## 2018-02-04 RX ADMIN — HEPARIN SODIUM 5000 UNITS: 5000 INJECTION, SOLUTION INTRAVENOUS; SUBCUTANEOUS at 14:55

## 2018-02-04 RX ADMIN — LABETALOL HYDROCHLORIDE 5 MG/MIN: 5 INJECTION, SOLUTION INTRAVENOUS at 12:08

## 2018-02-04 RX ADMIN — Medication 150 MG: at 09:09

## 2018-02-04 RX ADMIN — LABETALOL HYDROCHLORIDE 3 MG/MIN: 5 INJECTION, SOLUTION INTRAVENOUS at 10:54

## 2018-02-04 RX ADMIN — FLUOXETINE HYDROCHLORIDE 20 MG: 10 CAPSULE ORAL at 08:37

## 2018-02-04 RX ADMIN — DILTIAZEM HYDROCHLORIDE 15 MG/HR: 5 INJECTION INTRAVENOUS at 02:30

## 2018-02-04 RX ADMIN — INSULIN ASPART 1 UNITS: 100 INJECTION, SOLUTION INTRAVENOUS; SUBCUTANEOUS at 08:38

## 2018-02-04 RX ADMIN — SENNOSIDES AND DOCUSATE SODIUM 1 TABLET: 8.6; 5 TABLET ORAL at 19:33

## 2018-02-04 RX ADMIN — ESMOLOL HYDROCHLORIDE 300 MCG/KG/MIN: 20 INJECTION INTRAVENOUS at 02:30

## 2018-02-04 RX ADMIN — INSULIN ASPART 1 UNITS: 100 INJECTION, SOLUTION INTRAVENOUS; SUBCUTANEOUS at 00:08

## 2018-02-04 RX ADMIN — ESMOLOL HYDROCHLORIDE 300 MCG/KG/MIN: 20 INJECTION INTRAVENOUS at 00:42

## 2018-02-04 RX ADMIN — HEPARIN SODIUM 5000 UNITS: 5000 INJECTION, SOLUTION INTRAVENOUS; SUBCUTANEOUS at 05:55

## 2018-02-04 RX ADMIN — SENNOSIDES AND DOCUSATE SODIUM 1 TABLET: 8.6; 5 TABLET ORAL at 10:54

## 2018-02-04 RX ADMIN — Medication 150 MG: at 19:32

## 2018-02-04 RX ADMIN — AMLODIPINE BESYLATE 10 MG: 10 TABLET ORAL at 08:37

## 2018-02-04 RX ADMIN — ESMOLOL HYDROCHLORIDE 300 MCG/KG/MIN: 20 INJECTION INTRAVENOUS at 06:42

## 2018-02-04 RX ADMIN — POTASSIUM CHLORIDE, DEXTROSE MONOHYDRATE AND SODIUM CHLORIDE: 150; 5; 450 INJECTION, SOLUTION INTRAVENOUS at 05:56

## 2018-02-04 RX ADMIN — INSULIN ASPART 1 UNITS: 100 INJECTION, SOLUTION INTRAVENOUS; SUBCUTANEOUS at 03:51

## 2018-02-04 RX ADMIN — DILTIAZEM HYDROCHLORIDE 10 MG/HR: 5 INJECTION INTRAVENOUS at 12:29

## 2018-02-04 RX ADMIN — FUROSEMIDE 20 MG: 10 INJECTION, SOLUTION INTRAVENOUS at 10:54

## 2018-02-04 RX ADMIN — HEPARIN SODIUM 5000 UNITS: 5000 INJECTION, SOLUTION INTRAVENOUS; SUBCUTANEOUS at 21:03

## 2018-02-04 RX ADMIN — POLYETHYLENE GLYCOL 3350 17 G: 17 POWDER, FOR SOLUTION ORAL at 10:54

## 2018-02-04 NOTE — PROGRESS NOTES
"Patient admitted for progression of penetrating aortic ulcer in the lower thoracic aorta associated with intermittent pain.  Unclear if the patient has been compliant with antihypertensive regimen at home.  Progression on multiple CT scans is impressive.  Also has a small abdominal aortic aneurysm, and this in conjunction with heme being , and young, effectively confirms that this is largely a genetic underlying problem.  In this context long-term degeneration of the remainder of his aorta is very possible.  Patient is currently pain-free on IV antihypertensives.  Volume of fluid being given hourly is too much my opinion.  High risk of these patients becoming fluid overloaded.    BP 93/56 (BP Location: Right arm)  Pulse 78  Temp 98.3  F (36.8  C) (Oral)  Ht 1.448 m (4' 9\")  Wt 64.7 kg (142 lb 10.2 oz)  SpO2 100%  BMI 30.87 kg/m2     Plan:  1.  Switch from esmolol to labetalol IV drip.  Target BP less than 120 systolic and heart rate less than 60.  2.  Start oral beta blocker and calcium channel blocker today as well.  3.  Will discuss with CT surgery and plan around potential for stent graft treatment of this lesion in the next few days.  "

## 2018-02-04 NOTE — PROGRESS NOTES
"  SURGICAL ICU PROGRESS NOTE  February 4, 2018          Date of Service (when I saw the patient): 02/04/2018    ASSESSMENT:  Tiffany Lenz is a 47 year old male who was admitted on 2/3/2018  For yupe B aortic dissection who is admitted with worsening aortic dissection. Past medical history notable for tobacco use, depression, Type II diabetes and hypertension with medication non-compliance.  Previously admitted at  [facility] from January 12, 2018 and left against medical advice (AMA) on January 23, 2018. Readmitted on 1/25/18 for hypertensive crisis and tachycardia after prompting of his primary PCP and then again left AMA on 1/26/18 after his symptoms resolved. Reported to have not taken any prescribed medications per OSH records since leaving hospital He returned to the ED with 2/3/18 for chest and back pain that started around 11:30 pm night prior where he was then transferred to [facility].      CHANGES FOR TODAY/MAJOR THINGS:   D/c IVF   Change esmolol to labetalol infusion   Start oral medications   Lasix 20 mg X 1 dose      Neuro/ pain/ sedation:  # Acute pain  # Depression   - Neuro intact currently, will continue to monitor neurological status. Notify the MD for any acute changes in exam.  - tylenol PRN, dilaudid PRN and oral oxycodone for main   - efforts to prevent hospital acquired delirium   - resume home anti-depressants      Pulmonary care:   # no issues   - Currently without work of breathing, with normal O2 saturation. Supplemental oxygen to keep saturation above 92 %.     Cardiovascular:    # Hypertension with hx of medication non-compliance   # Type B thoracic aortic dissection: vascular surgery primary. Per outside report: \"acute penetrating atheromatous ulcer with increasing type B aortic dissection and intramural hematoma as well as associated increasing 4.4 cm aneurysmal dilation of the thoracic aorta. Also supraceliac penetrating atheromatous ulcer with associated intramural hematoma and " "4.1 x 3.8 cm infrarenal abdominal aortic aneurysm\"  - PTA medications: HCTZ ,lisinopril, amlodipine, Labetalol all listed from time of d/c from [place]--doubt pt taking medications since his d/c per OSH records   - Images in [facility].   - Vascular following, Goal HR <60 and SBP < 120  - Oral labetalol, amlodipine, continue with Cardizem and Labetalol infusion     GI care:   # no issues   - CLD   - start bowel regimen today.      Fluids/ Electrolytes/ Nutrition:   # No issues   - Monitor electrolytes and replete as needed.   - No indication for parenteral nutrition or tube feeds.   - No need for PPI/H2 prophylaxis     Renal/ Fluid Balance:    - Will continue to monitor intake and output.  - Lasix 20 mg x1 dose today.   - creatinine stable.      Endocrine:    # diabetes mellitus   - Sliding scale for diabetes management.   - hold Metformin       ID/ Antibiotics:  - No indication for antibiotics.     Heme:     # no issues   - Hemoglobin stable.      General Cares and Prophylaxis:    - Mechanical prophylaxis for DVT and Heparin SC TID  - GI: no stress ulcer prophylaxis indicated      Lines/ tubes/ drains:  - PIVx2  - Right  radial art line     Disposition:  - Surgical ICU.     General Cares/Prophylaxis:    DVT Prophylaxis: Heparin SQ  GI Prophylaxis: Not indicated  Restraints: Restraints for medical healing needed: NO    Lines/ tubes/ drains:  - PIVs  - Arterial line     Disposition:  - Surgical ICU      Patient seen, findings and plan discussed with surgical ICU staff, Dr. Figueroa     Time spent on this Encounter   Billing:  I spent 30 minutes bedside and on the inpatient unit today managing the critical care of Tiffany Lenz in relation to the issues listed in this note.      Benedicto Lenz-Delta    ====================================    SUBJECTIVE:  Course reviewed. No acute events overnight. Denies chest, epigastric or back pain. Denies vision change, headaches, nausea or vomiting. Denies fever or chills, weakness or " numbness.     OBJECTIVE:   1. VITAL SIGNS:   Temp:  [97.6  F (36.4  C)-98.3  F (36.8  C)] 98.3  F (36.8  C)  Heart Rate:  [55-78] 60  BP: ()/(56-85) 93/56  MAP:  [70 mmHg-102 mmHg] 82 mmHg  Arterial Line BP: ()/(56-86) 113/70  SpO2:  [86 %-100 %] 100 %  No Data Recorded    2. INTAKE/ OUTPUT:   I/O last 3 completed shifts:  In: 2866.64 [I.V.:2866.64]  Out: 3670 [Urine:3670]    3. PHYSICAL EXAMINATION:   General: laying in bed, awake listening to music   HEENT: pupils equal. OP clear, tongue midline.   Neuro: A&Ox3, follows commands correctly and consistently   Pulm/Resp: Clear breath sounds bilaterally without rhonchi, crackles or wheeze,  breathing non-labored. On room air   CV: RRR, S1, S2   Abdomen: abdomen protuberant but soft and compressible, non-tender, no rebound tenderness or guarding, no masses  :no mark, no urine.   Incisions/Skin: skin warm and dry, no rashes   Extremities: TORRES. No  peripheral edema, moving all extremities, peripheral pulses intact 2+ in BLE and BUE. Right radial arterialline.     4. INVESTIGATIONS:   Arterial Blood Gases   No lab results found in last 7 days.  Complete Blood Count     Recent Labs  Lab 02/03/18  0908   WBC 11.1*   HGB 12.0*        Basic Metabolic Panel    Recent Labs  Lab 02/04/18  0342 02/03/18  0908    136   POTASSIUM 4.4 3.7   CHLORIDE 110* 108   CO2 16* 18*   BUN 10 21   CR 0.73 0.65*   * 152*     Liver Function Tests    Recent Labs  Lab 02/03/18  0908   INR 1.02     Pancreatic Enzymes  No lab results found in last 7 days.  Coagulation Profile    Recent Labs  Lab 02/03/18  0908   INR 1.02   PTT 34       5. RADIOLOGY:   No results found for this or any previous visit (from the past 24 hour(s)).    =========================================

## 2018-02-04 NOTE — PLAN OF CARE
"Problem: Patient Care Overview  Goal: Plan of Care/Patient Progress Review  Outcome: No Change  D: 47 year old year old with type B thoracic aortic dissection  I/A: Neuro intact. Strict bedrest. Diltiazem 15 mg/hr, esmolol 300 mcg/kg/min (max for each per order) for goal SBP < 110, goal HR < 60. Most of the night when pt at rest HR 55-65, SBP , HR increased briefly with cares and when patient repositioned self in bed. At 0500 HR trending up around 70 consistently, MD aware. R radial art line positional, correlating with cuff. Lungs clear, 2L NC between 4427-1800 for desat to 88%, otherwise O2 sats > 94% on RA. NPO except meds, using urinal at bedside with good UO, last BM \"three days ago\" per pt. Pt complained of back pain, expressed relief with Tylenol. Pt likes to listen to [place] music for relaxation. Please see flowsheets for for vitals and assessments.  P:  scheduled to come today at 0800. Advance to clear liquids if appropriate. Continue to monitor and treat as ordered. Offer support to patient and family as able.      "

## 2018-02-05 ENCOUNTER — OFFICE VISIT (OUTPATIENT)
Dept: INTERPRETER SERVICES | Facility: CLINIC | Age: 48
End: 2018-02-05
Payer: COMMERCIAL

## 2018-02-05 LAB
ANION GAP SERPL CALCULATED.3IONS-SCNC: 10 MMOL/L (ref 3–14)
BUN SERPL-MCNC: 9 MG/DL (ref 7–30)
CALCIUM SERPL-MCNC: 8.5 MG/DL (ref 8.5–10.1)
CHLORIDE SERPL-SCNC: 110 MMOL/L (ref 94–109)
CO2 SERPL-SCNC: 20 MMOL/L (ref 20–32)
CREAT SERPL-MCNC: 0.82 MG/DL (ref 0.66–1.25)
ERYTHROCYTE [DISTWIDTH] IN BLOOD BY AUTOMATED COUNT: 12.3 % (ref 10–15)
GFR SERPL CREATININE-BSD FRML MDRD: >90 ML/MIN/1.7M2
GLUCOSE BLDC GLUCOMTR-MCNC: 137 MG/DL (ref 70–99)
GLUCOSE BLDC GLUCOMTR-MCNC: 158 MG/DL (ref 70–99)
GLUCOSE BLDC GLUCOMTR-MCNC: 168 MG/DL (ref 70–99)
GLUCOSE BLDC GLUCOMTR-MCNC: 244 MG/DL (ref 70–99)
GLUCOSE SERPL-MCNC: 106 MG/DL (ref 70–99)
HCT VFR BLD AUTO: 34.3 % (ref 40–53)
HGB BLD-MCNC: 11.2 G/DL (ref 13.3–17.7)
INTERPRETATION ECG - MUSE: NORMAL
MAGNESIUM SERPL-MCNC: 1.8 MG/DL (ref 1.6–2.3)
MCH RBC QN AUTO: 30.5 PG (ref 26.5–33)
MCHC RBC AUTO-ENTMCNC: 32.7 G/DL (ref 31.5–36.5)
MCV RBC AUTO: 94 FL (ref 78–100)
PHOSPHATE SERPL-MCNC: 3.1 MG/DL (ref 2.5–4.5)
PLATELET # BLD AUTO: 214 10E9/L (ref 150–450)
POTASSIUM SERPL-SCNC: 3.5 MMOL/L (ref 3.4–5.3)
RBC # BLD AUTO: 3.67 10E12/L (ref 4.4–5.9)
SODIUM SERPL-SCNC: 141 MMOL/L (ref 133–144)
WBC # BLD AUTO: 7.8 10E9/L (ref 4–11)

## 2018-02-05 PROCEDURE — 00000146 ZZHCL STATISTIC GLUCOSE BY METER IP

## 2018-02-05 PROCEDURE — 80048 BASIC METABOLIC PNL TOTAL CA: CPT | Performed by: SURGERY

## 2018-02-05 PROCEDURE — 25000125 ZZHC RX 250: Performed by: STUDENT IN AN ORGANIZED HEALTH CARE EDUCATION/TRAINING PROGRAM

## 2018-02-05 PROCEDURE — 25000132 ZZH RX MED GY IP 250 OP 250 PS 637: Performed by: STUDENT IN AN ORGANIZED HEALTH CARE EDUCATION/TRAINING PROGRAM

## 2018-02-05 PROCEDURE — T1013 SIGN LANG/ORAL INTERPRETER: HCPCS | Mod: U3

## 2018-02-05 PROCEDURE — 99233 SBSQ HOSP IP/OBS HIGH 50: CPT | Mod: GC | Performed by: SURGERY

## 2018-02-05 PROCEDURE — 25000128 H RX IP 250 OP 636: Performed by: SURGERY

## 2018-02-05 PROCEDURE — 25000132 ZZH RX MED GY IP 250 OP 250 PS 637: Performed by: SURGERY

## 2018-02-05 PROCEDURE — 36415 COLL VENOUS BLD VENIPUNCTURE: CPT | Performed by: SURGERY

## 2018-02-05 PROCEDURE — 84100 ASSAY OF PHOSPHORUS: CPT | Performed by: SURGERY

## 2018-02-05 PROCEDURE — 25000128 H RX IP 250 OP 636: Performed by: PHYSICIAN ASSISTANT

## 2018-02-05 PROCEDURE — 86850 RBC ANTIBODY SCREEN: CPT | Performed by: STUDENT IN AN ORGANIZED HEALTH CARE EDUCATION/TRAINING PROGRAM

## 2018-02-05 PROCEDURE — 86900 BLOOD TYPING SEROLOGIC ABO: CPT | Performed by: STUDENT IN AN ORGANIZED HEALTH CARE EDUCATION/TRAINING PROGRAM

## 2018-02-05 PROCEDURE — 20000004 ZZH R&B ICU UMMC

## 2018-02-05 PROCEDURE — 83735 ASSAY OF MAGNESIUM: CPT | Performed by: SURGERY

## 2018-02-05 PROCEDURE — 86901 BLOOD TYPING SEROLOGIC RH(D): CPT | Performed by: STUDENT IN AN ORGANIZED HEALTH CARE EDUCATION/TRAINING PROGRAM

## 2018-02-05 PROCEDURE — 25000125 ZZHC RX 250: Performed by: PHYSICIAN ASSISTANT

## 2018-02-05 PROCEDURE — 85027 COMPLETE CBC AUTOMATED: CPT | Performed by: SURGERY

## 2018-02-05 RX ORDER — LABETALOL HYDROCHLORIDE 300 MG/1
150 TABLET, FILM COATED ORAL ONCE
Status: COMPLETED | OUTPATIENT
Start: 2018-02-05 | End: 2018-02-05

## 2018-02-05 RX ORDER — LABETALOL 300 MG/1
300 TABLET, FILM COATED ORAL EVERY 12 HOURS SCHEDULED
Status: DISCONTINUED | OUTPATIENT
Start: 2018-02-05 | End: 2018-02-05

## 2018-02-05 RX ORDER — LABETALOL 300 MG/1
300 TABLET, FILM COATED ORAL EVERY 8 HOURS SCHEDULED
Status: DISCONTINUED | OUTPATIENT
Start: 2018-02-05 | End: 2018-02-07

## 2018-02-05 RX ADMIN — DILTIAZEM HYDROCHLORIDE 12 MG/HR: 5 INJECTION INTRAVENOUS at 22:02

## 2018-02-05 RX ADMIN — POTASSIUM CHLORIDE 20 MEQ: 750 TABLET, EXTENDED RELEASE ORAL at 06:17

## 2018-02-05 RX ADMIN — DILTIAZEM HYDROCHLORIDE 15 MG/HR: 5 INJECTION INTRAVENOUS at 05:27

## 2018-02-05 RX ADMIN — DILTIAZEM HYDROCHLORIDE 13 MG/HR: 5 INJECTION INTRAVENOUS at 15:16

## 2018-02-05 RX ADMIN — Medication 2 G: at 06:15

## 2018-02-05 RX ADMIN — HEPARIN SODIUM 5000 UNITS: 5000 INJECTION, SOLUTION INTRAVENOUS; SUBCUTANEOUS at 05:27

## 2018-02-05 RX ADMIN — FLUOXETINE HYDROCHLORIDE 20 MG: 10 CAPSULE ORAL at 08:13

## 2018-02-05 RX ADMIN — HEPARIN SODIUM 5000 UNITS: 5000 INJECTION, SOLUTION INTRAVENOUS; SUBCUTANEOUS at 14:29

## 2018-02-05 RX ADMIN — HEPARIN SODIUM 5000 UNITS: 5000 INJECTION, SOLUTION INTRAVENOUS; SUBCUTANEOUS at 21:10

## 2018-02-05 RX ADMIN — AMLODIPINE BESYLATE 10 MG: 10 TABLET ORAL at 08:13

## 2018-02-05 RX ADMIN — LABETALOL HCL 300 MG: 300 TABLET, FILM COATED ORAL at 21:10

## 2018-02-05 RX ADMIN — Medication 150 MG: at 09:08

## 2018-02-05 RX ADMIN — Medication 150 MG: at 08:13

## 2018-02-05 NOTE — PROGRESS NOTES
VASCULAR SURGERY PROGRESS NOTE    SUBJECTIVE:  Patient sitting up in bed, denies any chest pain or back pain at present. Labetalol and Diltiazem restarted last night.    OBJECTIVE:  Vital signs:  Temp: 97.6  F (36.4  C) Temp src: Oral BP: 112/70   Heart Rate: 78 Resp: 16 SpO2: 98 % O2 Device: None (Room air)        Intake/Output Summary (Last 24 hours) at 02/05/18 0859  Last data filed at 02/05/18 0800   Gross per 24 hour   Intake           968.15 ml   Output             2630 ml   Net         -1661.85 ml       Vitals:    02/03/18 0800 02/04/18 0300 02/05/18 0200   Weight: 64.7 kg (142 lb 10.2 oz) 66.6 kg (146 lb 13.2 oz) 64.6 kg (142 lb 6.7 oz)       PHYSICAL EXAM:  NEURO/PSYCH: The patient is alert and oriented.  Appropriate.  Moves all extremities.    SKIN: warm and dry.  PULMONARY: non-labored breathing  EXTREMITIES: warm and well perfused, palpable bilateral PT and DP pulses     BMP  Recent Labs  Lab 02/05/18  0452 02/04/18  0342 02/03/18  0908    138 136   POTASSIUM 3.5 4.4 3.7   CHLORIDE 110* 110* 108   CO2 20 16* 18*   BUN 9 10 21   CR 0.82 0.73 0.65*   * 172* 152*   MAG 1.8 2.1 1.8   PHOS 3.1  --  3.4     CBC  Recent Labs  Lab 02/05/18  0452 02/03/18  0908   WBC 7.8 11.1*   HGB 11.2* 12.0*    229     INR/PTT  Recent Labs  Lab 02/03/18  0908   INR 1.02   PTT 34         ASSESSMENT:  47 year old male with type B thoracic aortic dissection, intramural hematoma     PLAN:  - continue target BP less than 120 systolic and heart rate less than 60  - wean labetalol and diltiazem as tolerated, need to avoid fluid overload    - plan for CT scan tomorrow   - once stable on oral BP medications, plan for stent graft treatment during this hospitalization within the next week       Holly MATTHEWS, CNS  Division of Vascular Surgery  [facility]  Pager [phone]

## 2018-02-05 NOTE — PLAN OF CARE
"Problem: Patient Care Overview  Goal: Plan of Care/Patient Progress Review  Outcome: No Change  Events: diltiazem weaned off, labetalol from 5 to off switched from esmolol.     Assessment: neuro intact appropriate no changes. CV- SR goal < 60, SBP goal < 110, maintained with gtt's off since . mIVF switched to tko. Resp- tolerating RA. LS clear. GI/- miralax/senna given, encouraged BM since LBM estimated . Tolerating clears. auop into urinal. Lasix for diuresis. Skin intact. Right radial AL intact.   Activity: independently repositions in bed.     Plan: stent soon.     Family history obtained: patient asked about any pertinent family history this morning with  present. Patient denies any history of vascular disease in the family, stating that his parents  of \"old age\" around 80 years old. No history of diabetes, heart disease, cancer, hyperlipidemia, connective tissue disease, or aneurysms. He is a currenty smoker. Patient encouraged to quit, educated on the importance of medication compliance with BP meds specifically, to monitor blood sugars, exercise, eat a heart healthy diet, and stop smoking.           "

## 2018-02-05 NOTE — PROGRESS NOTES
"  SURGICAL ICU PROGRESS NOTE  February 4, 2018          Date of Service (when I saw the patient): 02/05/2018    ASSESSMENT:  Tiffany Lenz is a 47 year old male who was admitted on 2/3/2018  For typre B aortic dissection who is admitted for worsening aortic dissection. Past medical history notable for tobacco use, depression, Type II diabetes and hypertension with medication non-compliance.  Previously admitted at [facility] from January 12, 2018 and left against medical advice (AMA) on January 23, 2018. Readmitted on 1/25/18 for hypertensive crisis and tachycardia after prompting of his primary PCP and then again left AMA on 1/26/18 after his symptoms resolved. Reported to have not taken any prescribed medications per OSH records since leaving hospital He returned to the ED with 2/3/18 for chest and back pain. He was admitted to the SICU for close blood pressure monitoring before placement of a stent.       CHANGES FOR TODAY/MAJOR THINGS:   - Increase Labetalol to 300mg BID  - Restart PTA Lisinopril this evening if BP stable  - Advance diet as tolerated   - Activity as tolerated   - Plan for CTA tomorrow per vascular surgery     Neuro/ pain/ sedation:  # Acute pain  # Depression   - Neuro intact , will continue to monitor. Notify the MD for any acute changes in exam.  - tylenol PRN, dilaudid PRN and oral oxycodone for pain   - efforts to prevent hospital acquired delirium   - resume home anti-depressants - Fluoxetine       Pulmonary care:   # no issues   - Currently without work of breathing, with normal O2 saturation. Supplemental oxygen to keep saturation above 92 %.     Cardiovascular:    # Hypertension with hx of medication non-compliance   # Type B thoracic aortic dissection: vascular surgery primary. Per outside report: \"acute penetrating atheromatous ulcer with increasing type B aortic dissection and intramural hematoma as well as associated increasing 4.4 cm aneurysmal dilation of the thoracic aorta. " "Also supraceliac penetrating atheromatous ulcer with associated intramural hematoma and 4.1 x 3.8 cm infrarenal abdominal aortic aneurysm\"  - Images in Isite.   - PTA medications: HCTZ ,lisinopril, amlodipine, Labetalol listed from d/c from [place]  - Vascular following, Goal HR <60 and SBP < 120  - Labetalol 300mg BID, PTA amlodipine  - Continue with Cardizem and Labetalol infusion   - Will consider starting PTA lisinopril tonight if BP stable     GI care:   # no issues   - ADAT  - bowel regimen     Fluids/ Electrolytes/ Nutrition:   # No issues   - Monitor electrolytes and replete as needed.   - No indication for parenteral nutrition or tube feeds.   - No need for PPI/H2 prophylaxis     Renal/ Fluid Balance:    - Will continue to monitor intake and output.  - Creatinine stable.      Endocrine:    # diabetes mellitus   - Sliding scale for diabetes management.   - Hold PTA Metformin       ID/ Antibiotics:  - No indication for antibiotics.     Heme:     # no issues   - Hemoglobin stable.      General Cares and Prophylaxis:    - Mechanical prophylaxis for DVT and Heparin SC TID  - GI: NO ppx indicated      Lines/ tubes/ drains:  - PIVx2     Disposition:  - Surgical ICU with plan to transition to floor when weaned from IV blood pressure medication     Patient seen, findings and plan discussed with surgical ICU staff, Dr. Panchito Woods Sessions  SICU Resident   [phone]    ====================================    SUBJECTIVE:  Course reviewed. No acute events overnight. Pain gone since BP controlled. Restarted on IV BP medications with heart rate in the mid 70s yesterday afternoon.  Patient denies chest, epigastric or back pain. Denies vision change, headaches, nausea or vomiting. Denies fever or chills, weakness or numbness.     OBJECTIVE:   1. VITAL SIGNS:   Temp:  [98.1  F (36.7  C)-98.5  F (36.9  C)] 98.1  F (36.7  C)  Heart Rate:  [60-82] 67  Resp:  [16-18] 16  BP: ()/(56-86) 113/71  MAP:  [69 " mmHg-161 mmHg] 104 mmHg  Arterial Line BP: ()/() 118/88  SpO2:  [92 %-100 %] 95 %  Resp: 16    2. INTAKE/ OUTPUT:   I/O last 3 completed shifts:  In: 1563.55 [P.O.:220; I.V.:1343.55]  Out: 2830 [Urine:2830]    3. PHYSICAL EXAMINATION:   General: laying in bed, awake listening to music   HEENT: pupils equal. OP clear, tongue midline.   Neuro: A&Ox3, follows commands correctly and consistently   Pulm/Resp: Clear breath sounds bilaterally without rhonchi, crackles or wheeze,  breathing non-labored. On room air   CV: RRR, S1, S2   Abdomen: abdomen protuberant but soft and compressible, non-tender, no rebound tenderness or guarding, no masses  : no mark  Incisions/Skin: skin warm and dry, no rashes   Extremities: TORRES. No  peripheral edema, moving all extremities, peripheral pulses intact 2+ in BLE and BUE.     4. INVESTIGATIONS:   Arterial Blood Gases   No lab results found in last 7 days.  Complete Blood Count     Recent Labs  Lab 02/05/18  0452 02/03/18  0908   WBC 7.8 11.1*   HGB 11.2* 12.0*    229     Basic Metabolic Panel    Recent Labs  Lab 02/05/18  0452 02/04/18  0342 02/03/18  0908    138 136   POTASSIUM 3.5 4.4 3.7   CHLORIDE 110* 110* 108   CO2 20 16* 18*   BUN 9 10 21   CR 0.82 0.73 0.65*   * 172* 152*     Liver Function Tests    Recent Labs  Lab 02/03/18  0908   INR 1.02     Pancreatic Enzymes  No lab results found in last 7 days.  Coagulation Profile    Recent Labs  Lab 02/03/18  0908   INR 1.02   PTT 34       5. RADIOLOGY:   No results found for this or any previous visit (from the past 24 hour(s)).    =========================================

## 2018-02-05 NOTE — PLAN OF CARE
Problem: Patient Care Overview  Goal: Plan of Care/Patient Progress Review  Outcome: No Change  D: 47 year old male with type B aortic dissection  I/A: Neuro intact. SBP goal < 120 until 0700, Labetalol restarted at 2 mg/min. Diltiazem restarted 0030, now 15 mg/hr for HR goal < 60. Since 0300 HR more consistently 60-70, HR briefly increases >70 when independently repositioning in bed and with cares, MD aware. Lung sounds clear, room air, incentive spirometer encouraged. Tolerating clears, c/o hunger, requesting sandwich. Adequate UO with urinal at bedside. Senna given, up to toilet with diarrhea x 1 this shift (first BM since 2/1 per pt report). Skin intact. Right radial art line not correlating, d/c'd. Please see flowsheets for for vitals and assessments.  P:  requested for this am. Stent soon. Continue to monitor and treat as ordered. Offer support to patient and family as able.

## 2018-02-05 NOTE — PLAN OF CARE
Problem: Patient Care Overview  Goal: Plan of Care/Patient Progress Review  Outcome: Improving  D/I/A:  Neuro: alert and oriented, hmong speaking, but able to communicate minimally with english.  here this morning while doctors rounded, no pain and SBA. Sitting in chair most of the day  CV: BP to keep systolic less than 120 but greater than 90, HR between 60-70. Controlled by dilt gtt and labatelol gtt. Labetalol turned off this AM around 1200, attempting to wean down on dilt gtt.   Resp: RA, LS clear  GI: No BM, Regular diet, tolerating well. BM meds held due to loose stool for night nurse. Needs help ordering food.   : Voiding bedside urinal.   Skin: intact.     Gtt: Dilt gtt @ 13ml/hr.   Labetaolol off  TKO    P:stent AAA next week, try to get off gtt and get to orals and then to floor until surgery. Continue to monitor plan of care, notify MD of any changes.

## 2018-02-06 ENCOUNTER — OFFICE VISIT (OUTPATIENT)
Dept: INTERPRETER SERVICES | Facility: CLINIC | Age: 48
End: 2018-02-06
Payer: COMMERCIAL

## 2018-02-06 ENCOUNTER — APPOINTMENT (OUTPATIENT)
Dept: CT IMAGING | Facility: CLINIC | Age: 48
End: 2018-02-06
Attending: SURGERY
Payer: COMMERCIAL

## 2018-02-06 LAB
ABO + RH BLD: NORMAL
ABO + RH BLD: NORMAL
BLD GP AB SCN SERPL QL: NORMAL
BLOOD BANK CMNT PATIENT-IMP: NORMAL
GLUCOSE BLDC GLUCOMTR-MCNC: 169 MG/DL (ref 70–99)
GLUCOSE BLDC GLUCOMTR-MCNC: 232 MG/DL (ref 70–99)
GLUCOSE BLDC GLUCOMTR-MCNC: 242 MG/DL (ref 70–99)
GLUCOSE BLDC GLUCOMTR-MCNC: 97 MG/DL (ref 70–99)
SPECIMEN EXP DATE BLD: NORMAL

## 2018-02-06 PROCEDURE — 99233 SBSQ HOSP IP/OBS HIGH 50: CPT | Performed by: NURSE PRACTITIONER

## 2018-02-06 PROCEDURE — 25000128 H RX IP 250 OP 636: Performed by: RADIOLOGY

## 2018-02-06 PROCEDURE — T1013 SIGN LANG/ORAL INTERPRETER: HCPCS | Mod: U3

## 2018-02-06 PROCEDURE — 00000146 ZZHCL STATISTIC GLUCOSE BY METER IP

## 2018-02-06 PROCEDURE — 25000128 H RX IP 250 OP 636: Performed by: NURSE PRACTITIONER

## 2018-02-06 PROCEDURE — 25000132 ZZH RX MED GY IP 250 OP 250 PS 637: Performed by: SURGERY

## 2018-02-06 PROCEDURE — 25000132 ZZH RX MED GY IP 250 OP 250 PS 637: Performed by: NURSE PRACTITIONER

## 2018-02-06 PROCEDURE — 20000004 ZZH R&B ICU UMMC

## 2018-02-06 PROCEDURE — 74174 CTA ABD&PLVS W/CONTRAST: CPT

## 2018-02-06 PROCEDURE — 25000132 ZZH RX MED GY IP 250 OP 250 PS 637: Performed by: STUDENT IN AN ORGANIZED HEALTH CARE EDUCATION/TRAINING PROGRAM

## 2018-02-06 PROCEDURE — 25000128 H RX IP 250 OP 636: Performed by: SURGERY

## 2018-02-06 PROCEDURE — 40000141 ZZH STATISTIC PERIPHERAL IV START W/O US GUIDANCE

## 2018-02-06 RX ORDER — DILTIAZEM HYDROCHLORIDE 120 MG/1
120 CAPSULE, COATED, EXTENDED RELEASE ORAL DAILY
Status: DISCONTINUED | OUTPATIENT
Start: 2018-02-06 | End: 2018-02-07

## 2018-02-06 RX ORDER — DEXTROSE MONOHYDRATE 25 G/50ML
25-50 INJECTION, SOLUTION INTRAVENOUS
Status: DISCONTINUED | OUTPATIENT
Start: 2018-02-06 | End: 2018-02-06

## 2018-02-06 RX ORDER — LABETALOL HYDROCHLORIDE 5 MG/ML
10-20 INJECTION, SOLUTION INTRAVENOUS
Status: DISCONTINUED | OUTPATIENT
Start: 2018-02-06 | End: 2018-02-16 | Stop reason: HOSPADM

## 2018-02-06 RX ORDER — NICOTINE POLACRILEX 4 MG
15-30 LOZENGE BUCCAL
Status: DISCONTINUED | OUTPATIENT
Start: 2018-02-06 | End: 2018-02-06

## 2018-02-06 RX ORDER — LISINOPRIL 10 MG/1
10 TABLET ORAL 2 TIMES DAILY
Status: DISCONTINUED | OUTPATIENT
Start: 2018-02-06 | End: 2018-02-16 | Stop reason: HOSPADM

## 2018-02-06 RX ORDER — IOPAMIDOL 755 MG/ML
100 INJECTION, SOLUTION INTRAVASCULAR ONCE
Status: COMPLETED | OUTPATIENT
Start: 2018-02-06 | End: 2018-02-06

## 2018-02-06 RX ADMIN — Medication 20 MG: at 18:17

## 2018-02-06 RX ADMIN — LABETALOL HCL 300 MG: 300 TABLET, FILM COATED ORAL at 14:16

## 2018-02-06 RX ADMIN — AMLODIPINE BESYLATE 10 MG: 10 TABLET ORAL at 08:03

## 2018-02-06 RX ADMIN — IOPAMIDOL 100 ML: 755 INJECTION, SOLUTION INTRAVENOUS at 15:23

## 2018-02-06 RX ADMIN — Medication 20 MG: at 20:25

## 2018-02-06 RX ADMIN — FLUOXETINE HYDROCHLORIDE 20 MG: 10 CAPSULE ORAL at 08:03

## 2018-02-06 RX ADMIN — HEPARIN SODIUM 5000 UNITS: 5000 INJECTION, SOLUTION INTRAVENOUS; SUBCUTANEOUS at 06:11

## 2018-02-06 RX ADMIN — HEPARIN SODIUM 5000 UNITS: 5000 INJECTION, SOLUTION INTRAVENOUS; SUBCUTANEOUS at 22:05

## 2018-02-06 RX ADMIN — DILTIAZEM HYDROCHLORIDE 120 MG: 120 CAPSULE, EXTENDED RELEASE ORAL at 08:03

## 2018-02-06 RX ADMIN — LABETALOL HCL 300 MG: 300 TABLET, FILM COATED ORAL at 22:06

## 2018-02-06 RX ADMIN — Medication 20 MG: at 13:01

## 2018-02-06 RX ADMIN — HEPARIN SODIUM 5000 UNITS: 5000 INJECTION, SOLUTION INTRAVENOUS; SUBCUTANEOUS at 14:47

## 2018-02-06 RX ADMIN — LABETALOL HCL 300 MG: 300 TABLET, FILM COATED ORAL at 06:11

## 2018-02-06 RX ADMIN — LISINOPRIL 10 MG: 10 TABLET ORAL at 20:21

## 2018-02-06 RX ADMIN — Medication 20 MG: at 22:22

## 2018-02-06 RX ADMIN — LISINOPRIL 10 MG: 10 TABLET ORAL at 08:03

## 2018-02-06 NOTE — PROGRESS NOTES
VASCULAR SURGERY PROGRESS NOTE  February 6, 2018      SUBJECTIVE:  Patient sitting up in bed, well appearing. No acute concerns overnight. Still requiring Diltiazem gtt to maintain HR 60-70 bpm. No abdominal pain; tolerating regular diet.     OBJECTIVE:  Vital signs:  Temp: 98  F (36.7  C) Temp src: Oral BP: 110/72   Heart Rate: 67 Resp: 12 SpO2: 98 % O2 Device: None (Room air)      I/O last 3 completed shifts:  In: 1236.04 [P.O.:760; I.V.:476.04]  Out: 1450 [Urine:1450]      Vitals:    02/03/18 0800 02/04/18 0300 02/05/18 0200   Weight: 64.7 kg (142 lb 10.2 oz) 66.6 kg (146 lb 13.2 oz) 64.6 kg (142 lb 6.7 oz)     PHYSICAL EXAM:  NEURO/PSYCH: The patient is alert and oriented.  Appropriate.  Moves all extremities.    SKIN: warm and dry.  PULMONARY: non-labored breathing  EXTREMITIES: warm and well perfused.    BMP    Recent Labs  Lab 02/05/18  0452 02/04/18  0342 02/03/18  0908    138 136   POTASSIUM 3.5 4.4 3.7   CHLORIDE 110* 110* 108   CO2 20 16* 18*   BUN 9 10 21   CR 0.82 0.73 0.65*   * 172* 152*   MAG 1.8 2.1 1.8   PHOS 3.1  --  3.4     CBC    Recent Labs  Lab 02/05/18  0452 02/03/18  0908   WBC 7.8 11.1*   HGB 11.2* 12.0*    229     INR/PTT    Recent Labs  Lab 02/03/18  0908   INR 1.02   PTT 34     =========================================================    ASSESSMENT:  47 year old male with type B thoracic aortic dissection, intramural hematoma. Currently receiving Amlodipine 10mg, Lisinopril 10mg BID, Labetalol 300mg q8h for goal systolic BP < 120 mmHg. Also receiving Diltiazem 5mg/hr; aim for HR < 60 bpm.     PLAN:  --Start Diltiazem XR 120mg daily for HR < 60 bpm  --Continue Amlodipine, Lisinopril, Labetalol  --If HR still >60 bpm and there is BP room, then increase Labetalol as next step  --Plan for CTA C/A/P today for interval evaluation of type B aortic dissection  --Tentatively planning to go to OR next week Tuesday with CVTS for thoracic stent graft   --Heparin SQ for DVT  prophylaxis  --Insulin sliding scale for hyperglycemia correction  --Appreciate SICU cares; please monitor in the ICU for another 24 hrs prior to floor transfer    Plan of care discussed with Dr. Monique.     Mary Jo Murray MD  Cardiology Fellow  Vascular Surgery Service  [phone]

## 2018-02-06 NOTE — PROGRESS NOTES
"  SURGICAL ICU PROGRESS NOTE  February 6, 2018           Date of Service (when I saw the patient): 02/06/2018    ASSESSMENT:  Tiffany Lenz is a 47 year old male who was admitted on 2/3/2018  For type B aortic dissection who is admitted for worsening aortic dissection. Past medical history notable for tobacco use, depression, Type II diabetes and hypertension with medication non-compliance.  Previously admitted at [facility] from January 12, 2018 and left against medical advice (AMA) on January 23, 2018. Readmitted on 1/25/18 for hypertensive crisis and tachycardia after prompting of his primary PCP and then again left AMA on 1/26/18 after his symptoms resolved. Reported to have not taken any prescribed medications per OSH records since leaving hospital He returned to the ED with 2/3/18 for chest and back pain. He was admitted to the SICU for close blood pressure monitoring before placement of a stent.       CHANGES FOR TODAY/MAJOR THINGS:   - Dc diltiazem gtt and Start diltiazem 120 mg  XR   - Restart PTA Lisinopril at half dose  - CTA today     Neuro/ pain/ sedation:  # Acute pain  # Depression   - Neuro intact , will continue to monitor. Notify the MD for any acute changes in exam.  - tylenol PRN, dilaudid PRN and oral oxycodone for pain   - efforts to prevent hospital acquired delirium   - resume home anti-depressants - Fluoxetine       Pulmonary care:   # No issues   - Currently without work of breathing, with normal O2 saturation. Supplemental oxygen to keep saturation above 92 %.     Cardiovascular:    # Hypertension with hx of medication non-compliance   # Type B thoracic aortic dissection: vascular surgery primary. Per outside report: \"acute penetrating atheromatous ulcer with increasing type B aortic dissection and intramural hematoma as well as associated increasing 4.4 cm aneurysmal dilation of the thoracic aorta. Also supraceliac penetrating atheromatous ulcer with associated intramural hematoma and " "4.1 x 3.8 cm infrarenal abdominal aortic aneurysm\"  - Images in Isite.   - PTA medications: HCTZ ,lisinopril, amlodipine, Labetalol listed from d/c from [place]  - Vascular following, Goal HR <60 and SBP < 120  - Labetalol 300mg BID, PTA amlodipine, lisinopril 10 mg BID   - DC Cardizem and Labetalol infusion  - Diltiazem  mg daily       GI care:   # No issues   - ADAT  - bowel regimen     Fluids/ Electrolytes/ Nutrition:   # No issues   - Monitor electrolytes and replete as needed.   - No indication for parenteral nutrition or tube feeds.   - No need for PPI/H2 prophylaxis     Renal/ Fluid Balance:    - Will continue to monitor intake and output.  - Creatinine stable.      Endocrine:    # Diabetes mellitus   - Sliding scale for diabetes management.   - Hold PTA Metformin       ID/ Antibiotics:  - No indication for antibiotics.     Heme:     # No issues   - Hemoglobin stable.      General Cares and Prophylaxis:    - Mechanical prophylaxis for DVT and Heparin SC TID  - GI: NO ppx indicated      Lines/ tubes/ drains:  - PIVx2     Disposition:  - Surgical ICU with plan to transition to floor tomorrow   Patient seen, findings and plan discussed with surgical ICU staff, Dr. Panchito Harris, NP-C     ====================================    SUBJECTIVE:  Course reviewed. No acute events overnight. Denies dyspnea, chest pain, palpitations, dizziness, vomiting.   OBJECTIVE:   1. VITAL SIGNS:   Temp:  [97.5  F (36.4  C)-98.3  F (36.8  C)] 97.5  F (36.4  C)  Heart Rate:  [55-85] 65  Resp:  [12-16] 14  BP: ()/() 105/59  SpO2:  [94 %-100 %] 98 %  Resp: 14    2. INTAKE/ OUTPUT:   I/O last 3 completed shifts:  In: 1236.04 [P.O.:760; I.V.:476.04]  Out: 1450 [Urine:1450]    3. PHYSICAL EXAMINATION:   Physical Exam   Constitutional: He is oriented to person, place, and time. He appears well-developed and well-nourished.   HENT:   Head: Normocephalic and atraumatic.   Eyes: EOM are normal. Pupils are " equal, round, and reactive to light.   Neck: Normal range of motion. Neck supple.   Cardiovascular: Normal rate, regular rhythm and normal heart sounds.    Pulmonary/Chest: Effort normal and breath sounds normal.   Abdominal: Soft. Bowel sounds are normal.   Musculoskeletal: Normal range of motion.   Neurological: He is alert and oriented to person, place, and time.   Skin: Skin is warm and dry.   Psychiatric: He has a normal mood and affect.     4. INVESTIGATIONS:   Complete Blood Count     Recent Labs  Lab 02/05/18  0452 02/03/18  0908   WBC 7.8 11.1*   HGB 11.2* 12.0*    229     Basic Metabolic Panel    Recent Labs  Lab 02/05/18  0452 02/04/18  0342 02/03/18  0908    138 136   POTASSIUM 3.5 4.4 3.7   CHLORIDE 110* 110* 108   CO2 20 16* 18*   BUN 9 10 21   CR 0.82 0.73 0.65*   * 172* 152*       Recent Labs  Lab 02/03/18  0908   INR 1.02   PTT 34       5. RADIOLOGY:   No results found for this or any previous visit (from the past 24 hour(s)).    =========================================

## 2018-02-06 NOTE — PLAN OF CARE
Problem: Patient Care Overview  Goal: Plan of Care/Patient Progress Review  Status: patient intermittently sleeping in recliner overnight.  D/I: Patient on unit 4A Surgical/Neuro ICU s/: transfer from [facility] on 02/03/18 for type B aortic dissection.    Neuro- intact; PERRL. Moves all extremities spontaneously and to command. Hmong speaking; blue  phone in room. In person  ordered via phone this AM for morning rounds and CT angio.   CV- VSS, sinus hugo to rhythm. Afebrile. Keep systolic blood pressure less than 120 and keep heart rate between 60-70. Diltiazem @5cc/hour. TID Labetalol.     Pulm- lung sounds clear to diminished at bases. Incentive spirometer used every 2-4 hours. On room air.  GI- regular diet. Blood sugar checked before bedtime. Last BM on 02/05/18.  - urinal at bedside.  Skin- intact.  Gtts- tko @5, Diltiazem @5.   Pain-denies, CAPA.  Electrolytes- replaced per protocol.  See flow sheets for further interventions and assessments.  P: Continue to monitor pt closely, Notify MD of changes/concerns.

## 2018-02-07 LAB
ANION GAP SERPL CALCULATED.3IONS-SCNC: 9 MMOL/L (ref 3–14)
BUN SERPL-MCNC: 13 MG/DL (ref 7–30)
CALCIUM SERPL-MCNC: 9.4 MG/DL (ref 8.5–10.1)
CHLORIDE SERPL-SCNC: 108 MMOL/L (ref 94–109)
CO2 SERPL-SCNC: 24 MMOL/L (ref 20–32)
CREAT SERPL-MCNC: 0.86 MG/DL (ref 0.66–1.25)
GFR SERPL CREATININE-BSD FRML MDRD: >90 ML/MIN/1.7M2
GLUCOSE BLDC GLUCOMTR-MCNC: 101 MG/DL (ref 70–99)
GLUCOSE BLDC GLUCOMTR-MCNC: 119 MG/DL (ref 70–99)
GLUCOSE BLDC GLUCOMTR-MCNC: 321 MG/DL (ref 70–99)
GLUCOSE SERPL-MCNC: 134 MG/DL (ref 70–99)
MAGNESIUM SERPL-MCNC: 2.1 MG/DL (ref 1.6–2.3)
PHOSPHATE SERPL-MCNC: 4.5 MG/DL (ref 2.5–4.5)
POTASSIUM SERPL-SCNC: 3.8 MMOL/L (ref 3.4–5.3)
SODIUM SERPL-SCNC: 140 MMOL/L (ref 133–144)

## 2018-02-07 PROCEDURE — 99233 SBSQ HOSP IP/OBS HIGH 50: CPT | Mod: GC | Performed by: SURGERY

## 2018-02-07 PROCEDURE — 00000146 ZZHCL STATISTIC GLUCOSE BY METER IP

## 2018-02-07 PROCEDURE — 25000132 ZZH RX MED GY IP 250 OP 250 PS 637: Performed by: SURGERY

## 2018-02-07 PROCEDURE — 25000128 H RX IP 250 OP 636: Performed by: NURSE PRACTITIONER

## 2018-02-07 PROCEDURE — 20000004 ZZH R&B ICU UMMC

## 2018-02-07 PROCEDURE — 83735 ASSAY OF MAGNESIUM: CPT | Performed by: STUDENT IN AN ORGANIZED HEALTH CARE EDUCATION/TRAINING PROGRAM

## 2018-02-07 PROCEDURE — 80048 BASIC METABOLIC PNL TOTAL CA: CPT | Performed by: STUDENT IN AN ORGANIZED HEALTH CARE EDUCATION/TRAINING PROGRAM

## 2018-02-07 PROCEDURE — 25000132 ZZH RX MED GY IP 250 OP 250 PS 637: Performed by: STUDENT IN AN ORGANIZED HEALTH CARE EDUCATION/TRAINING PROGRAM

## 2018-02-07 PROCEDURE — 25000132 ZZH RX MED GY IP 250 OP 250 PS 637: Performed by: NURSE PRACTITIONER

## 2018-02-07 PROCEDURE — 25000132 ZZH RX MED GY IP 250 OP 250 PS 637: Performed by: PHYSICIAN ASSISTANT

## 2018-02-07 PROCEDURE — 36415 COLL VENOUS BLD VENIPUNCTURE: CPT | Performed by: STUDENT IN AN ORGANIZED HEALTH CARE EDUCATION/TRAINING PROGRAM

## 2018-02-07 PROCEDURE — 25000128 H RX IP 250 OP 636: Performed by: SURGERY

## 2018-02-07 PROCEDURE — 84100 ASSAY OF PHOSPHORUS: CPT | Performed by: STUDENT IN AN ORGANIZED HEALTH CARE EDUCATION/TRAINING PROGRAM

## 2018-02-07 RX ORDER — DILTIAZEM HYDROCHLORIDE 240 MG/1
240 CAPSULE, COATED, EXTENDED RELEASE ORAL DAILY
Status: DISCONTINUED | OUTPATIENT
Start: 2018-02-08 | End: 2018-02-16 | Stop reason: HOSPADM

## 2018-02-07 RX ORDER — LABETALOL 300 MG/1
600 TABLET, FILM COATED ORAL EVERY 8 HOURS SCHEDULED
Status: DISCONTINUED | OUTPATIENT
Start: 2018-02-07 | End: 2018-02-16 | Stop reason: HOSPADM

## 2018-02-07 RX ORDER — DILTIAZEM HYDROCHLORIDE 120 MG/1
120 CAPSULE, COATED, EXTENDED RELEASE ORAL ONCE
Status: DISCONTINUED | OUTPATIENT
Start: 2018-02-07 | End: 2018-02-07

## 2018-02-07 RX ADMIN — Medication 600 MG: at 22:00

## 2018-02-07 RX ADMIN — LABETALOL HCL 300 MG: 300 TABLET, FILM COATED ORAL at 06:13

## 2018-02-07 RX ADMIN — SENNOSIDES AND DOCUSATE SODIUM 1 TABLET: 8.6; 5 TABLET ORAL at 09:08

## 2018-02-07 RX ADMIN — LISINOPRIL 10 MG: 10 TABLET ORAL at 09:08

## 2018-02-07 RX ADMIN — Medication 20 MG: at 06:13

## 2018-02-07 RX ADMIN — AMLODIPINE BESYLATE 10 MG: 10 TABLET ORAL at 09:07

## 2018-02-07 RX ADMIN — LISINOPRIL 10 MG: 10 TABLET ORAL at 19:37

## 2018-02-07 RX ADMIN — HEPARIN SODIUM 5000 UNITS: 5000 INJECTION, SOLUTION INTRAVENOUS; SUBCUTANEOUS at 13:28

## 2018-02-07 RX ADMIN — DILTIAZEM HYDROCHLORIDE 120 MG: 120 CAPSULE, EXTENDED RELEASE ORAL at 09:07

## 2018-02-07 RX ADMIN — Medication 10 MG: at 21:16

## 2018-02-07 RX ADMIN — FLUOXETINE HYDROCHLORIDE 20 MG: 10 CAPSULE ORAL at 09:07

## 2018-02-07 RX ADMIN — HEPARIN SODIUM 5000 UNITS: 5000 INJECTION, SOLUTION INTRAVENOUS; SUBCUTANEOUS at 22:03

## 2018-02-07 RX ADMIN — Medication 20 MG: at 01:06

## 2018-02-07 RX ADMIN — Medication 20 MG: at 03:09

## 2018-02-07 RX ADMIN — HEPARIN SODIUM 5000 UNITS: 5000 INJECTION, SOLUTION INTRAVENOUS; SUBCUTANEOUS at 06:12

## 2018-02-07 RX ADMIN — Medication 600 MG: at 13:27

## 2018-02-07 RX ADMIN — POTASSIUM CHLORIDE 20 MEQ: 750 TABLET, EXTENDED RELEASE ORAL at 06:12

## 2018-02-07 NOTE — PLAN OF CARE
Problem: Patient Care Overview  Goal: Plan of Care/Patient Progress Review    8252-8229:  Neuro: A&O. Denies pain. PERRL. Afebrile. Calls appropriately. Hmong speaking,  phone at bedside.   Card: HR 50s-80s. Goal <60 but HR increases even with slight movement, HR low 60s-70s overnight. SBP goal <120; Labetolol PRN x4 overnight.   Pulm: RA. Lungs clear.   GI: Regular diet. No BM overnight.   : Voiding spontaneously.   Endo: AC/HS.   Skin: Intact. Area of possible extravasation on (L) forearm marked, bruising noted and swelling/firmness in area. C/o numbness in (L) palm, thumb, and wrist. Will continue to monitor.     Asleep in chair all night. Plan for possible transfer to floor today and stent placement next week. Will continue to monitor and notify team with updates.

## 2018-02-07 NOTE — PLAN OF CARE
Problem: Patient Care Overview  Goal: Plan of Care/Patient Progress Review  Outcome: No Change  CTA done today. Awaiting stent placement, most likely next week. Medically manage until then. Monitor overnight and then transfer to general care 2/7 if remains off IV gtts.      N: Alert. Hmong speaking, understands some basic English. In person  utilized 3 times today - care explained and all questions answered. Oriented when assessed with . PERRL. Moves all extremities to command with equal strength.   C: SB-NSR. HR 55-70. Vascular and SICU updated HR not <60 throughout day. PO diltiazem and lisinopril started today. Also received scheduled amlodipine and PO labetalol. PRN labetalol added for SBP >120, given X2. Pulses palpable. No edema. Afebrile.   R: Room air, lungs sounds clear. Encourage pulmonary hygiene. Uses incentive spirometer independently.   GI: Tolerating a regular diet. Likes oatmeal, eggs, pancakes, fish, brown rice, mashed potatoes with beef gravy and green tea. Sliding scale insulin changed to high intensity nomogram. BM this shift.   : Voiding in urinal or bathroom without difficulty.   Skin: Intact. Left forearm edematous area painful to touch, left palm under thumb is numb - SICU notified, warm packs and elevation.   Access: R AC PIV - saline locked.   Pain: Denies.   Activity: Stand by assist. Up in chair all day.      Plan: Continue to monitor and control blood pressure/ heart rate. Notify MD of changes.

## 2018-02-07 NOTE — PROGRESS NOTES
VASCULAR SURGERY PROGRESS NOTE  February 7, 2018      SUBJECTIVE:  Patient sitting up in bed, well appearing. Continues to deny pain.  No acute concerns overnight. Required several doses of Labetalol for hypertension above parameters. Repeat CT performed.     OBJECTIVE:  Vital signs:  Temp: 97.5  F (36.4  C) Temp src: Oral BP: 113/74   Heart Rate: 63 Resp: 14 SpO2: 98 % O2 Device: None (Room air)      I/O last 3 completed shifts:  In: 1165 [P.O.:1140; I.V.:25]  Out: 2225 [Urine:2225]      Vitals:    02/03/18 0800 02/04/18 0300 02/05/18 0200   Weight: 64.7 kg (142 lb 10.2 oz) 66.6 kg (146 lb 13.2 oz) 64.6 kg (142 lb 6.7 oz)     PHYSICAL EXAM:  GEN: Well appearing, sitting comfortably upright in chair eating breakfast  NEURO/PSYCH: A&O x3.  Appropriate interactions.  Moves all extremities.    SKIN: warm and dry.  PULMONARY: non-labored breathing  EXTREMITIES: warm and well perfused.    BMP    Recent Labs  Lab 02/07/18  0526 02/05/18  0452 02/04/18  0342 02/03/18  0908    141 138 136   POTASSIUM 3.8 3.5 4.4 3.7   CHLORIDE 108 110* 110* 108   CO2 24 20 16* 18*   BUN 13 9 10 21   CR 0.86 0.82 0.73 0.65*   * 106* 172* 152*   MAG 2.1 1.8 2.1 1.8   PHOS  --  3.1  --  3.4     CBC    Recent Labs  Lab 02/05/18  0452 02/03/18  0908   WBC 7.8 11.1*   HGB 11.2* 12.0*    229     INR/PTT    Recent Labs  Lab 02/03/18  0908   INR 1.02   PTT 34     CTA C/A/P 2/6/18:  1. Stable large penetrating aortic ulcer in the mid descending aorta with the base measuring 3.0 cm; this results in a Hi type B intramural hematoma/aortic dissection which extends from the proximal descending thoracic aorta to the distal descending thoracic aorta. No subdiaphragmatic extension. There is aneurysmal dilatation of the ascending and descending aorta with maximum diameters of 4.1 and 3.8 cm, respectively.  2. Fusiform aneurysm of the distal abdominal aorta extending into the aortic bifurcation, with a maximum diameter of 4.0 cm.  3.  New small left-sided pleural effusion.  4. Mild cardiomegaly.  5. Indeterminate 10 mm hypodensity in the right mid renal pole and 1.3 cm hypodensity in the right lower renal pole. Consider renal ultrasound for further characterization.    Echocardiogram 1/13/18 ([facility]):    Left ventricle ejection fraction is normal. The calculated left ventricular ejection fraction is 68%.    Mild ascending aortic dilatation. No dissection flap identified.    No significant valvular heart disease    No previous study for comparison.    =========================================================    ASSESSMENT:  47 year old male with type B thoracic aortic dissection, intramural hematoma. Currently receiving Amlodipine 10mg, Lisinopril 10mg BID, Labetalol 300mg q8h for goal systolic BP < 120 mmHg. Also received 3 doses of IV Labetalol overnight for hypertension above desired parameters. CTA reveals stably Hi type B dissection with large (>3cm) penetrating aortic ulcer in the mid-segment. We favor keeping him in the SICU another day until hemodynamics are ideal on oral medications. Tentative plan for OR next week Tuesday with CVTS for thoracic stent graft.     PLAN:  --Increase Diltiazem XR to 240 mg today for goal HR < 60 bpm  --Increase Labetalol to prior dose of 600mg q8h given need for PRN doses overnight  --Continue Amlodipine, Lisinopril at current doses  --Tentatively planning to go to OR next week Tuesday   --Heparin SQ for DVT prophylaxis  --Insulin sliding scale for hyperglycemia correction  --Appreciate SICU cares; please monitor in the ICU for another 24 hrs prior to floor transfer    Plan of care discussed with Dr. Monique.     Mary Jo Murray MD  Cardiology Fellow  Vascular Surgery Service  [phone]

## 2018-02-07 NOTE — PROGRESS NOTES
SURGICAL ICU PROGRESS NOTE  February 7, 2018       Date of Service (when I saw the patient): 02/07/2018    ASSESSMENT:  Tiffany Lenz is a 47 year old male who was admitted on 2/3/2018  For type B aortic dissection who is admitted for worsening aortic dissection. Past medical history notable for tobacco use, depression, Type II diabetes and hypertension with medication non-compliance.  Previously admitted at [facility] from January 12, 2018 and left against medical advice (AMA) on January 23, 2018. Readmitted on 1/25/18 for hypertensive crisis and tachycardia after prompting of his primary PCP and then again left AMA on 1/26/18 after his symptoms resolved. Reported to have not taken any prescribed medications per OSH records since leaving hospital He returned to the ED with 2/3/18 for chest and back pain. He was admitted to the SICU for close blood pressure monitoring before placement of a stent.       CHANGES FOR TODAY/MAJOR THINGS:     - Increase Labetalol 600mg q8h   - Will consider increasing diltiazem to 240 mg XR this afternoon versus tomorrow   - Will discuss transfer to  with vascular team - transfer this afternoon versus tomorrow     Neuro/ pain/ sedation:  # Acute pain  # Depression   - Neuro intact, will continue to monitor. Notify the MD for any acute changes in exam.  - tylenol PRN, dilaudid PRN and oral oxycodone for pain   - efforts to prevent hospital acquired delirium   - resume home anti-depressants - Fluoxetine       Pulmonary care:   # No issues   - Currently without work of breathing, with normal O2 saturation. Supplemental oxygen to keep saturation above 92 %.     Cardiovascular:    # Hypertension with hx of medication non-compliance   # Type B thoracic aortic dissection: vascular surgery primary.   - CTA complete 2/6   - PTA medications: HCTZ ,lisinopril, amlodipine, labetalol listed from d/c from [place]  - Vascular following, Goal HR <60 and SBP < 120  - PRN labetalol for systolic  > 120  - Labetalol 600mg TID, PTA amlodipine, lisinopril 10 mg BID   - Diltiazem  mg daily - will consider increasing this afternoon      GI care:   # No issues   - ADAT  - bowel regimen     Fluids/ Electrolytes/ Nutrition:   # No issues   - Monitor electrolytes and replete as needed.   - No indication for parenteral nutrition or tube feeds.   - No need for PPI/H2 prophylaxis     Renal/ Fluid Balance:    - Will continue to monitor intake and output.  - Creatinine stable.      Endocrine:    # Diabetes mellitus   - Sliding scale for diabetes management.   - Hold PTA Metformin       ID/ Antibiotics:  - No indication for antibiotics.     Heme:     # No issues   - Hemoglobin stable.      General Cares and Prophylaxis:    - Mechanical prophylaxis for DVT and Heparin SC TID  - GI: NO ppx indicated      Lines/ tubes/ drains:  - PIVx2     Disposition:  - Surgical ICU with plan to transition to floor this afternoon versus tomorrow     Patient seen, findings and plan discussed with surgical ICU staff, Dr. Panchito Cantrell MD  SICU resident   [phone]    ====================================    SUBJECTIVE:  Course reviewed. No acute events overnight. Denies dyspnea, chest pain, palpitations, dizziness, vomiting.   OBJECTIVE:   1. VITAL SIGNS:   Temp:  [97.5  F (36.4  C)-98  F (36.7  C)] 97.5  F (36.4  C)  Heart Rate:  [59-92] 63  Resp:  [12-18] 14  BP: ()/(42-84) 113/74  SpO2:  [93 %-100 %] 98 %  Resp: 14    2. INTAKE/ OUTPUT:   I/O last 3 completed shifts:  In: 1165 [P.O.:1140; I.V.:25]  Out: 2225 [Urine:2225]    3. PHYSICAL EXAMINATION:   Physical Exam   Constitutional: oriented to person, place, and time  Head: Normocephalic and atraumatic.   Neck: Normal range of motion. Neck supple.   Cardiovascular: Normal rate, regular rhythm and normal heart sounds.    Pulmonary/Chest: Effort normal and breath sounds normal.   Abdominal: Soft. Bowel sounds are normal.   Musculoskeletal: Normal range of  motion, left forearm bruising - improving, soft and compressible forearm    Neurological: alert and oriented to person, place, and time.   Skin: Skin is warm and dry     4. INVESTIGATIONS:   Complete Blood Count     Recent Labs  Lab 02/05/18  0452 02/03/18  0908   WBC 7.8 11.1*   HGB 11.2* 12.0*    229     Basic Metabolic Panel    Recent Labs  Lab 02/07/18  0526 02/05/18  0452 02/04/18  0342 02/03/18  0908    141 138 136   POTASSIUM 3.8 3.5 4.4 3.7   CHLORIDE 108 110* 110* 108   CO2 24 20 16* 18*   BUN 13 9 10 21   CR 0.86 0.82 0.73 0.65*   * 106* 172* 152*       Recent Labs  Lab 02/03/18  0908   INR 1.02   PTT 34       5. RADIOLOGY:   Recent Results (from the past 24 hour(s))   CT Chest/Abd/Pelvis Angio w Processing    Narrative    Exam: Computed tomographic angiography of the chest, abdomen and  pelvis without and with contrast including 3D reformations dated  2/6/2018 4:01 PM    Clinical information: History of type B thoracic dissection; follow-up  progression    Technique: Axial images through the chest and abdomen obtained before  the administration of intravenous contrast media and following the  injection of contrast media  in the arterial phase. Source images  reviewed as well as 3D and multi-planar reconstructions at a 3D  workstation.    Contrast: iopamidol (ISOVUE-370) solution 100 mL    DLP: 2736 mGy*cm    Comparison: CT 2/3/2018.    Findings:  As seen previously, on the noncontrast enhanced scan there  is a hyperdense crescent intramural hematoma/thrombosed aortic  dissection which begins just distal to the takeoff of the left  subclavian artery, and extends to the distal descending level just  above the diaphragm. The overall size appears unchanged from 2/3/2018.  No extension below the diaphragm. On the post-contrast images, there  is a large penetrating aortic ulcer with the base measuring  approximately 3.0 cm (sagittal image 80). A small inferior component  of the ulcer is less  opacified than 2/3/2018, which could be related  to interval clotting or incomplete filling.    Thoracic aortic diameters:      Sinuses of Valsalva: 3.5 x 3.5 x 3.3 cm    Sinotubular ridge: 3.5 cm    Ascending aorta:  4.1 cm    Arch: 2.9 cm    Proximal descending thoracic aorta: 3.8 cm    Mid descending thoracic aorta: 3.7 cm    Descending thoracic aorta at the diaphragm: 2.4 cm     There is normal branching pattern of the great vessels. Mild calcified  and noncalcified atherosclerotic plaques in the proximal great  vessels. The proximal pulmonary vasculature  appears normal.     The celiac axis, SMA and AYLIN are patent . The renal arteries are  patent bilaterally. Fusiform aneurysm of the distal abdominal aorta  extending into the aortic bifurcation, with a maximum diameter of 4.0  cm; the aneurysm measures approximately 5.2 cm in length, and is  located 4.7 cm inferior to the right renal artery.     Chest and abdomen:     Mildly enlarged heart. No pericardial effusion. Small left-sided  pleural effusion with left lower lobe compressive atelectasis. No  pneumothorax or focal consolidation. Central airways are patent.  Respiratory motion limits evaluation for pulmonary nodule dissection.  Scattered small reactive appearing mediastinal lymph nodes.    Indeterminate 10 mm hypodensity in the right mid renal pole and a 1.3  cm hypodensity in the right lower pole. No hydronephrosis or  nephrolithiasis. The gallbladder, spleen, adrenal glands, pancreas,  small bowel, large bowel, and appendix are normal. No free fluid or  bulky lymphadenopathy.    Bones: No aggressive appearing bony lesion.      Impression    Impression:  1. 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 No  subdiaphragmatic extension.  There is aneurysmal dilatation of the  ascending and descending aorta with maximum diameters of 4.1 and 3.8  cm, respectively.  2. Fusiform aneurysm of the distal abdominal aorta extending into the  aortic bifurcation, with a maximum diameter of 4.0 cm.  3. New small left-sided pleural effusion.  4. Mild cardiomegaly.  5. Indeterminate 10 mm hypodensity in the right mid renal pole and 1.3  cm hypodensity in the right lower renal pole. Consider renal  ultrasound for further characterization.             =========================================

## 2018-02-07 NOTE — PLAN OF CARE
Problem: Patient Care Overview  Goal: Individualization & Mutuality    D: 47 year old male who was admitted on 2/3/2018  For type B aortic dissection who is admitted for worsening aortic dissection. Past medical history notable for tobacco use, depression, Type II diabetes and hypertension with medication non-compliance. Multiple recent admissions and AMA discharges.   N: AAOx4. Hmong speaking. Pupils PERRL 2-3 and brisk.   CV: SBP goal of <120 achieved without intervention. HR 60-70 with no ectopy noted. increasing afternoon dose of labetalol.   Pulm: Clear lungs on RA.   GI/: AC/HS BGs. Regular adult diet. Voiding into urinal. BM x1 this shift.  Skin: left forearm bruise from old IV infiltration site, within drawn outline/margin.   Plan: continue to increase cardiac meds to home dose and monitor. Transfer to 6C/6B when meds are at home doses and vitals remain within ordered parameters.

## 2018-02-08 ENCOUNTER — OFFICE VISIT (OUTPATIENT)
Dept: INTERPRETER SERVICES | Facility: CLINIC | Age: 48
End: 2018-02-08
Payer: COMMERCIAL

## 2018-02-08 ENCOUNTER — APPOINTMENT (OUTPATIENT)
Dept: PHYSICAL THERAPY | Facility: CLINIC | Age: 48
End: 2018-02-08
Attending: PHYSICIAN ASSISTANT
Payer: COMMERCIAL

## 2018-02-08 LAB
GLUCOSE BLDC GLUCOMTR-MCNC: 118 MG/DL (ref 70–99)
GLUCOSE BLDC GLUCOMTR-MCNC: 144 MG/DL (ref 70–99)
GLUCOSE BLDC GLUCOMTR-MCNC: 171 MG/DL (ref 70–99)
PLATELET # BLD AUTO: 262 10E9/L (ref 150–450)
POTASSIUM SERPL-SCNC: 3.8 MMOL/L (ref 3.4–5.3)

## 2018-02-08 PROCEDURE — 00000146 ZZHCL STATISTIC GLUCOSE BY METER IP

## 2018-02-08 PROCEDURE — 25000128 H RX IP 250 OP 636: Performed by: NURSE PRACTITIONER

## 2018-02-08 PROCEDURE — 97161 PT EVAL LOW COMPLEX 20 MIN: CPT | Mod: GP

## 2018-02-08 PROCEDURE — T1013 SIGN LANG/ORAL INTERPRETER: HCPCS | Mod: U3

## 2018-02-08 PROCEDURE — 40000014 ZZH STATISTIC ARTERIAL MONITORING DAILY

## 2018-02-08 PROCEDURE — 40000193 ZZH STATISTIC PT WARD VISIT

## 2018-02-08 PROCEDURE — 25000128 H RX IP 250 OP 636: Performed by: SURGERY

## 2018-02-08 PROCEDURE — 40000894 ZZH STATISTIC OT IP EVAL DEFER: Performed by: OCCUPATIONAL THERAPIST

## 2018-02-08 PROCEDURE — 85049 AUTOMATED PLATELET COUNT: CPT | Performed by: SURGERY

## 2018-02-08 PROCEDURE — 25000132 ZZH RX MED GY IP 250 OP 250 PS 637: Performed by: SURGERY

## 2018-02-08 PROCEDURE — 97116 GAIT TRAINING THERAPY: CPT | Mod: GP

## 2018-02-08 PROCEDURE — 25000132 ZZH RX MED GY IP 250 OP 250 PS 637: Performed by: NURSE PRACTITIONER

## 2018-02-08 PROCEDURE — 99232 SBSQ HOSP IP/OBS MODERATE 35: CPT | Mod: GC | Performed by: SURGERY

## 2018-02-08 PROCEDURE — 84132 ASSAY OF SERUM POTASSIUM: CPT | Performed by: SURGERY

## 2018-02-08 PROCEDURE — 12000006 ZZH R&B IMCU INTERMEDIATE UMMC

## 2018-02-08 PROCEDURE — 25000132 ZZH RX MED GY IP 250 OP 250 PS 637: Performed by: PHYSICIAN ASSISTANT

## 2018-02-08 PROCEDURE — 36415 COLL VENOUS BLD VENIPUNCTURE: CPT | Performed by: SURGERY

## 2018-02-08 RX ADMIN — SENNOSIDES AND DOCUSATE SODIUM 1 TABLET: 8.6; 5 TABLET ORAL at 21:42

## 2018-02-08 RX ADMIN — HEPARIN SODIUM 5000 UNITS: 5000 INJECTION, SOLUTION INTRAVENOUS; SUBCUTANEOUS at 13:40

## 2018-02-08 RX ADMIN — FLUOXETINE HYDROCHLORIDE 20 MG: 10 CAPSULE ORAL at 08:18

## 2018-02-08 RX ADMIN — HEPARIN SODIUM 5000 UNITS: 5000 INJECTION, SOLUTION INTRAVENOUS; SUBCUTANEOUS at 06:02

## 2018-02-08 RX ADMIN — LISINOPRIL 10 MG: 10 TABLET ORAL at 08:18

## 2018-02-08 RX ADMIN — LISINOPRIL 10 MG: 10 TABLET ORAL at 19:54

## 2018-02-08 RX ADMIN — HEPARIN SODIUM 5000 UNITS: 5000 INJECTION, SOLUTION INTRAVENOUS; SUBCUTANEOUS at 21:42

## 2018-02-08 RX ADMIN — DILTIAZEM HYDROCHLORIDE 240 MG: 240 CAPSULE, EXTENDED RELEASE ORAL at 08:18

## 2018-02-08 RX ADMIN — Medication 600 MG: at 06:00

## 2018-02-08 RX ADMIN — Medication 20 MG: at 02:21

## 2018-02-08 RX ADMIN — Medication 600 MG: at 13:40

## 2018-02-08 RX ADMIN — AMLODIPINE BESYLATE 10 MG: 10 TABLET ORAL at 08:19

## 2018-02-08 RX ADMIN — Medication 600 MG: at 21:42

## 2018-02-08 RX ADMIN — Medication 20 MG: at 11:54

## 2018-02-08 ASSESSMENT — ACTIVITIES OF DAILY LIVING (ADL)
SWALLOWING: 0-->SWALLOWS FOODS/LIQUIDS WITHOUT DIFFICULTY
ADLS_ACUITY_SCORE: 11
TOILETING: 0-->INDEPENDENT
RETIRED_EATING: 0-->INDEPENDENT
RETIRED_COMMUNICATION: 0-->UNDERSTANDS/COMMUNICATES WITHOUT DIFFICULTY
COGNITION: 0 - NO COGNITION ISSUES REPORTED
BATHING: 0-->INDEPENDENT
AMBULATION: 0-->INDEPENDENT
TRANSFERRING: 0-->INDEPENDENT
DRESS: 0-->INDEPENDENT
FALL_HISTORY_WITHIN_LAST_SIX_MONTHS: NO

## 2018-02-08 NOTE — PLAN OF CARE
Problem: Patient Care Overview  Goal: Plan of Care/Patient Progress Review  Outcome: Improving    No acute events this shift.  Pt transferred to  at 4:30PM    Neuro: A&O x4. No deficits.   CV: SB/SR 50-70s. SBP < 120. Labetalol given once for HR > 70.   Pulm: Lungs CTA, room air.   GI: Regular diet. No BM today.   : UOP 25-30/hr. Voided once this shift (275 mL over 10 hrs). Holly Howard notified - encourage oral hydration.   Skin: Intact. L arm bruised.   Pt refused lab draw this afternoon.

## 2018-02-08 NOTE — PROGRESS NOTES
SURGICAL ICU PROGRESS NOTE  February 8, 2018       Date of Service (when I saw the patient): 02/08/2018    ASSESSMENT:  Tiffany Lenz is a 47 year old male who was admitted on 2/3/2018  For type B aortic dissection who is admitted for worsening aortic dissection. Past medical history notable for tobacco use, depression, Type II diabetes and hypertension with medication non-compliance.  Previously admitted at [facility] from January 12, 2018 and left against medical advice (AMA) on January 23, 2018. Readmitted on 1/25/18 for hypertensive crisis and tachycardia after prompting of his primary PCP and then again left AMA on 1/26/18 after his symptoms resolved. Reported to have not taken any prescribed medications per OSH records since leaving hospital He returned to the ED with 2/3/18 for chest and back pain. He was admitted to the SICU for close blood pressure monitoring before placement of a stent.       CHANGES FOR TODAY/MAJOR THINGS:   - Will discuss transfer to  with vascular team - transfer this afternoon versus tomorrow   - D/c dilaudid  - Parameters for labetalol set - do not give if systolic BP less than 90    Neuro/ pain/ sedation:  # Acute pain  # Depression   - Neuro intact, will continue to monitor. Notify the MD for any acute changes in exam.  - tylenol PRN, oral oxycodone for pain   - efforts to prevent hospital acquired delirium   - resume home anti-depressants - Fluoxetine       Pulmonary care:   # No issues   - Currently without work of breathing, with normal O2 saturation. Supplemental oxygen to keep saturation above 92 %.     Cardiovascular:    # Hypertension with hx of medication non-compliance   # Type B thoracic aortic dissection: vascular surgery primary.   - CTA complete 2/6   - PTA medications: HCTZ , lisinopril, amlodipine, labetalol listed from d/c from [place]  - Vascular following, Goal HR <60 and SBP < 120  - PRN labetalol for systolic > 120  - Labetalol 600mg TID (do not give  for systolic <90), PTA amlodipine, lisinopril 10 mg BID   - Diltiazem  mg daily    GI care:   # No issues   - ADAT  - bowel regimen     Fluids/ Electrolytes/ Nutrition:   # No issues   - Monitor electrolytes and replete as needed.   - No indication for parenteral nutrition or tube feeds.   - No need for PPI/H2 prophylaxis     Renal/ Fluid Balance:    - Will continue to monitor intake and output.  - Creatinine stable.      Endocrine:    # Diabetes mellitus   - Sliding scale for diabetes management.   - Hold PTA Metformin       ID/ Antibiotics:  - No indication for antibiotics.     Heme:     # No issues   - Hemoglobin stable.      General Cares and Prophylaxis:    - Mechanical prophylaxis for DVT and Heparin SC TID  - GI: NO ppx indicated      Lines/ tubes/ drains:  - PIVx2     Disposition:  - Surgical ICU with plan to transition to floor this afternoon     Patient seen, findings and plan discussed with surgical ICU staff, Dr. Panchito Cantrell MD  SICU resident   [phone]    ====================================    SUBJECTIVE:  Course reviewed. No acute events overnight. Two doses of PRN IV labetalol with last dose at 0200. Denies dyspnea, chest pain, palpitations, dizziness, vomiting. Tolerating diet, voiding and having bowel movements without difficulty.     OBJECTIVE:   1. VITAL SIGNS:   Temp:  [97.5  F (36.4  C)-98.6  F (37  C)] 97.9  F (36.6  C)  Heart Rate:  [54-80] 61  Resp:  [8-27] 19  BP: ()/(35-96) 106/66  SpO2:  [93 %-99 %] 98 %  Resp: 19    2. INTAKE/ OUTPUT:   I/O last 3 completed shifts:  In: 900 [P.O.:900]  Out: 1225 [Urine:1225]    3. PHYSICAL EXAMINATION:   Physical Exam   Constitutional: oriented to person, place, and time  Head: Normocephalic and atraumatic.   Neck: Normal range of motion. Neck supple.   Cardiovascular: Normal rate, regular rhythm and normal heart sounds.    Pulmonary/Chest: Effort normal and breath sounds normal.   Abdominal: Soft. Bowel sounds are  normal.   Musculoskeletal: Normal range of motion, left forearm bruising - improved, soft and compressible forearm    Neurological: alert and oriented to person, place, and time.   Skin: Skin is warm and dry     4. INVESTIGATIONS:   Complete Blood Count     Recent Labs  Lab 02/05/18  0452 02/03/18  0908   WBC 7.8 11.1*   HGB 11.2* 12.0*    229     Basic Metabolic Panel    Recent Labs  Lab 02/07/18  0526 02/05/18  0452 02/04/18  0342 02/03/18  0908    141 138 136   POTASSIUM 3.8 3.5 4.4 3.7   CHLORIDE 108 110* 110* 108   CO2 24 20 16* 18*   BUN 13 9 10 21   CR 0.86 0.82 0.73 0.65*   * 106* 172* 152*       Recent Labs  Lab 02/03/18  0908   INR 1.02   PTT 34       5. RADIOLOGY:   No results found for this or any previous visit (from the past 24 hour(s)).    =========================================

## 2018-02-08 NOTE — PROGRESS NOTES
VASCULAR SURGERY PROGRESS NOTE    SUBJECTIVE:  Patient sitting up in chair, denies any abdominal or back pain. Offers no specific complaints. Received 2 doses of prn Labetalol overnight.  at bedside     OBJECTIVE:  Vital signs:  Temp: 97.9  F (36.6  C) Temp src: Oral BP: 113/67   Heart Rate: 64 Resp: 19 SpO2: 96 % O2 Device: None (Room air)        Intake/Output Summary (Last 24 hours) at 02/08/18 0842  Last data filed at 02/08/18 0600   Gross per 24 hour   Intake              660 ml   Output             1225 ml   Net             -565 ml       Vitals:    02/03/18 0800 02/04/18 0300 02/05/18 0200   Weight: 64.7 kg (142 lb 10.2 oz) 66.6 kg (146 lb 13.2 oz) 64.6 kg (142 lb 6.7 oz)       PHYSICAL EXAM:  NEURO/PSYCH: The patient is alert and oriented.  Appropriate.  Moves all extremities.    SKIN:  Warm and dry.  PULMONARY: non-labored breathing  EXTREMITIES: warm and well perfused, palpable bilateral DP and PT    BMP  Recent Labs  Lab 02/07/18  0526 02/05/18  0452 02/04/18  0342 02/03/18  0908    141 138 136   POTASSIUM 3.8 3.5 4.4 3.7   CHLORIDE 108 110* 110* 108   CO2 24 20 16* 18*   BUN 13 9 10 21   CR 0.86 0.82 0.73 0.65*   * 106* 172* 152*   MAG 2.1 1.8 2.1 1.8   PHOS 4.5 3.1  --  3.4     CBC  Recent Labs  Lab 02/05/18  0452 02/03/18  0908   WBC 7.8 11.1*   HGB 11.2* 12.0*    229     INR/PTT  Recent Labs  Lab 02/03/18  0908   INR 1.02   PTT 34         CT:  Exam: Computed tomographic angiography of the chest, abdomen and  pelvis without and with contrast including 3D reformations dated  2/6/2018 4:01 PM    Impression:  1. Stable large penetrating aortic ulcer in the mid descending aorta  with the base measuring 3.0 cm; this results in a Ih type B  intramural hematoma/aortic dissection which extends from the proximal  descending thoracic aorta to the distal descending thoracic aorta. No  subdiaphragmatic extension. There is aneurysmal dilatation of the  ascending and descending  aorta with maximum diameters of 4.1 and 3.8  cm, respectively.  2. Fusiform aneurysm of the distal abdominal aorta extending into the  aortic bifurcation, with a maximum diameter of 4.0 cm.  3. New small left-sided pleural effusion.  4. Mild cardiomegaly.  5. Indeterminate 10 mm hypodensity in the right mid renal pole and 1.3  cm hypodensity in the right lower renal pole. Consider renal  ultrasound for further characterization.        ASSESSMENT:  47 year old male with type B thoracic aortic dissection, intramural hematoma.  Currently receiving Amlodipine 10 mg daily, Lisinopril 10 mg BID, Labetalol 600 mg every 8 hours for goal of systolic BP < 120.  CTA scan done 2/6/18 demonstrated stable [employer] type B dissection with large penetrating aortic ulcer in the mid-segment.       PLAN:  - continue current oral antihypertension medication regimen with prn Labetalol   - continue target BP less than 120 systolic and heart rate less than 60  - tentatively planning to go to OR next week for thoracic stent graft  - continue heparin SQ for DVT prophylaxis  - will discuss with Dr. Monique if okay to transfer to  B       Holly MATTHEWS, CNS  Division of Vascular Surgery  [facility]  Pager [phone]

## 2018-02-08 NOTE — PROGRESS NOTES
Nutrition Progress Note - Chart screen per LOS (x5 days) in pt with Negative Nutrition Admission Screen and discussion of POC on SICU rounds    -Diet: Regular   -Intakes: pt eating 100% of all meals on 2/7-2/8, % of meal on 2/6 and 2/5;  not available today on rounds but RN confirmed pt ordering significant meals and good appetite/no intake concerns.    Interventions:  Collaboration and Referral of Nutrition care - Discussed plan for FEN/GI on rounds with Providers, pt and bedside RN.  No nutrition issues/dx identified at this time and will follow peripherally pending ongoing duration of LOS (note possible surgery next week for thoracic stent graft) and per nutrition protocols.    Mary Jo Goins,RD, LD, CNSC (4A SICU pgr [phone])

## 2018-02-08 NOTE — PLAN OF CARE
Problem: Patient Care Overview  Goal: Plan of Care/Patient Progress Review  Discharge Planner PT   Patient plan for discharge: Home with PRN assist  Current status: Performs sit<>stand and amb 900' without physical assistance. 1x initial loss of balance when first beginning to amb. No issues following. HR into 80s with activity and MAP increased to 83mmHg following amb. No symptoms during session. Will follow up post OR 2/13 to determine dispo status.  Barriers to return to prior living situation: Medical status  Recommendations for discharge: Likely home with assist, may possibly require outpatient cardiac rehab.  Rationale for recommendations: Current level of function       Entered by: Ethan Hobbs 02/08/2018 12:53 PM

## 2018-02-08 NOTE — PLAN OF CARE
Problem: Patient Care Overview  Goal: Plan of Care/Patient Progress Review  OT/4A: Cancel- Per discussion with PT, pt with no acute OT needs at this time. Will complete orders. Please reorder if new needs arise

## 2018-02-08 NOTE — PROGRESS NOTES
02/08/18 1100   Quick Adds   Type of Visit Initial PT Evaluation       Present yes   Language Hmong   Living Environment   Lives With alone   Living Arrangements apartment   Home Accessibility no concerns   Number of Stairs to Enter Home 0   Number of Stairs Within Home (Lives on 7th floor but has elevator access)   Transportation Available taxi   Living Environment Comment Pt lives alone but family lives close by and provides good support to pt as needed.   Self-Care   Usual Activity Tolerance good   Current Activity Tolerance good   Regular Exercise yes   Activity/Exercise Type walking   Exercise Amount/Frequency daily   Equipment Currently Used at Home none   Activity/Exercise/Self-Care Comment Pt enjoys shopping with friends and walking to the park.   Functional Level Prior   Ambulation 0-->independent   Transferring 0-->independent   Toileting 0-->independent   Bathing 0-->independent   Dressing 0-->independent   Eating 0-->independent   Fall history within last six months no   Which of the above functional risks had a recent onset or change? none   Prior Functional Level Comment Indep with mobility prior to admission.   General Information   Onset of Illness/Injury or Date of Surgery - Date 02/03/18   Patient/Family Goals Statement To have surgery 2/12   Pertinent History of Current Problem (include personal factors and/or comorbidities that impact the POC) Tiffany Her is a 47 year old male who was admitted on 2/3/2018  For type B aortic dissection who is admitted for worsening aortic dissection. Past medical history notable for tobacco use, depression, Type II diabetes and hypertension with medication non-compliance.  Previously admitted at [facility] from January 12, 2018 and left against medical advice (AMA) on January 23, 2018. Readmitted on 1/25/18 for hypertensive crisis and tachycardia after prompting of his primary PCP and then again left AMA on 1/26/18 after his symptoms  resolved. Reported to have not taken any prescribed medications per OSH records since leaving hospital He returned to the ED with 2/3/18 for chest and back pain. He was admitted to the SICU for close blood pressure monitoring before placement of a stent. Tentatively planned to stay in house until OR 2/12 for stent placement.   Precautions/Limitations no known precautions/limitations   General Observations Pt sitting up in chair, agreeable to session.   Cognitive Status Examination   Orientation orientation to person, place and time   Level of Consciousness alert   Follows Commands and Answers Questions 100% of the time   Personal Safety and Judgment intact   Memory intact   Pain Assessment   Patient Currently in Pain No   Integumentary/Edema   Integumentary/Edema no deficits were identifed   Posture    Posture Not impaired   Range of Motion (ROM)   ROM Comment WFL   Strength   Strength Comments WFL as per functional exam.   Bed Mobility   Bed Mobility Comments Expect indep   Transfer Skills   Transfer Comments Indep sit<>stand   Gait   Gait Comments Amb 15' without an AD and SBA due to mild LOB upon first amb. Improved with more reps and balance challenges.   Balance   Balance Comments SBA for standing dynamic balance.   Sensory Examination   Sensory Perception no deficits were identified   General Therapy Interventions   Planned Therapy Interventions balance training;gait training;home program guidelines;progressive activity/exercise   Clinical Impression   Criteria for Skilled Therapeutic Intervention yes, treatment indicated   PT Diagnosis Impaired functional endurance   Influenced by the following impairments Mild deconditioning   Functional limitations due to impairments Endurance, gait   Clinical Presentation Stable/Uncomplicated   Clinical Presentation Rationale Clinical judgement   Clinical Decision Making (Complexity) Low complexity   Therapy Frequency` 3 times/week  (To be scheduled next following OR 2/12)    Predicted Duration of Therapy Intervention (days/wks) 2 weeks   Anticipated Discharge Disposition Home with Assist   Risk & Benefits of therapy have been explained Yes   Patient, Family & other staff in agreement with plan of care Yes   Total Evaluation Time   Total Evaluation Time (Minutes) 10

## 2018-02-09 ENCOUNTER — OFFICE VISIT (OUTPATIENT)
Dept: INTERPRETER SERVICES | Facility: CLINIC | Age: 48
End: 2018-02-09
Payer: COMMERCIAL

## 2018-02-09 LAB
ANION GAP SERPL CALCULATED.3IONS-SCNC: 9 MMOL/L (ref 3–14)
BUN SERPL-MCNC: 22 MG/DL (ref 7–30)
CALCIUM SERPL-MCNC: 9.5 MG/DL (ref 8.5–10.1)
CHLORIDE SERPL-SCNC: 106 MMOL/L (ref 94–109)
CO2 SERPL-SCNC: 23 MMOL/L (ref 20–32)
CREAT SERPL-MCNC: 0.95 MG/DL (ref 0.66–1.25)
ERYTHROCYTE [DISTWIDTH] IN BLOOD BY AUTOMATED COUNT: 12.8 % (ref 10–15)
GFR SERPL CREATININE-BSD FRML MDRD: 86 ML/MIN/1.7M2
GLUCOSE BLDC GLUCOMTR-MCNC: 152 MG/DL (ref 70–99)
GLUCOSE BLDC GLUCOMTR-MCNC: 258 MG/DL (ref 70–99)
GLUCOSE BLDC GLUCOMTR-MCNC: 387 MG/DL (ref 70–99)
GLUCOSE BLDC GLUCOMTR-MCNC: 84 MG/DL (ref 70–99)
GLUCOSE SERPL-MCNC: 162 MG/DL (ref 70–99)
HCT VFR BLD AUTO: 37.3 % (ref 40–53)
HGB BLD-MCNC: 12.3 G/DL (ref 13.3–17.7)
MCH RBC QN AUTO: 31.7 PG (ref 26.5–33)
MCHC RBC AUTO-ENTMCNC: 33 G/DL (ref 31.5–36.5)
MCV RBC AUTO: 96 FL (ref 78–100)
PLATELET # BLD AUTO: 251 10E9/L (ref 150–450)
POTASSIUM SERPL-SCNC: 3.9 MMOL/L (ref 3.4–5.3)
RBC # BLD AUTO: 3.88 10E12/L (ref 4.4–5.9)
SODIUM SERPL-SCNC: 137 MMOL/L (ref 133–144)
WBC # BLD AUTO: 8.2 10E9/L (ref 4–11)

## 2018-02-09 PROCEDURE — 25000132 ZZH RX MED GY IP 250 OP 250 PS 637: Performed by: SURGERY

## 2018-02-09 PROCEDURE — T1013 SIGN LANG/ORAL INTERPRETER: HCPCS | Mod: U3

## 2018-02-09 PROCEDURE — 00000146 ZZHCL STATISTIC GLUCOSE BY METER IP

## 2018-02-09 PROCEDURE — 25000128 H RX IP 250 OP 636: Performed by: NURSE PRACTITIONER

## 2018-02-09 PROCEDURE — 25000132 ZZH RX MED GY IP 250 OP 250 PS 637: Performed by: NURSE PRACTITIONER

## 2018-02-09 PROCEDURE — 36415 COLL VENOUS BLD VENIPUNCTURE: CPT | Performed by: CLINICAL NURSE SPECIALIST

## 2018-02-09 PROCEDURE — 25000132 ZZH RX MED GY IP 250 OP 250 PS 637: Performed by: STUDENT IN AN ORGANIZED HEALTH CARE EDUCATION/TRAINING PROGRAM

## 2018-02-09 PROCEDURE — 80048 BASIC METABOLIC PNL TOTAL CA: CPT | Performed by: CLINICAL NURSE SPECIALIST

## 2018-02-09 PROCEDURE — 12000006 ZZH R&B IMCU INTERMEDIATE UMMC

## 2018-02-09 PROCEDURE — 85027 COMPLETE CBC AUTOMATED: CPT | Performed by: CLINICAL NURSE SPECIALIST

## 2018-02-09 PROCEDURE — 25000128 H RX IP 250 OP 636: Performed by: SURGERY

## 2018-02-09 PROCEDURE — 25000132 ZZH RX MED GY IP 250 OP 250 PS 637: Performed by: PHYSICIAN ASSISTANT

## 2018-02-09 RX ADMIN — POTASSIUM CHLORIDE 20 MEQ: 1.5 POWDER, FOR SOLUTION ORAL at 06:18

## 2018-02-09 RX ADMIN — Medication 20 MG: at 02:11

## 2018-02-09 RX ADMIN — HEPARIN SODIUM 5000 UNITS: 5000 INJECTION, SOLUTION INTRAVENOUS; SUBCUTANEOUS at 06:18

## 2018-02-09 RX ADMIN — SENNOSIDES AND DOCUSATE SODIUM 1 TABLET: 8.6; 5 TABLET ORAL at 08:10

## 2018-02-09 RX ADMIN — Medication 600 MG: at 06:18

## 2018-02-09 RX ADMIN — AMLODIPINE BESYLATE 10 MG: 10 TABLET ORAL at 08:09

## 2018-02-09 RX ADMIN — Medication 600 MG: at 22:22

## 2018-02-09 RX ADMIN — LISINOPRIL 10 MG: 10 TABLET ORAL at 19:29

## 2018-02-09 RX ADMIN — DILTIAZEM HYDROCHLORIDE 240 MG: 240 CAPSULE, EXTENDED RELEASE ORAL at 08:14

## 2018-02-09 RX ADMIN — Medication 10 MG: at 04:08

## 2018-02-09 RX ADMIN — LISINOPRIL 10 MG: 10 TABLET ORAL at 08:14

## 2018-02-09 RX ADMIN — HEPARIN SODIUM 5000 UNITS: 5000 INJECTION, SOLUTION INTRAVENOUS; SUBCUTANEOUS at 14:22

## 2018-02-09 RX ADMIN — FLUOXETINE HYDROCHLORIDE 20 MG: 10 CAPSULE ORAL at 08:10

## 2018-02-09 RX ADMIN — Medication 600 MG: at 14:22

## 2018-02-09 ASSESSMENT — ACTIVITIES OF DAILY LIVING (ADL)
ADLS_ACUITY_SCORE: 11

## 2018-02-09 NOTE — PLAN OF CARE
Problem: Patient Care Overview  Goal: Plan of Care/Patient Progress Review  Outcome: Improving  Neuro: A&Ox4.   Cardiac: SR/SB. VSS. Goal , HR 60.                      Respiratory: Sating 100% on RA.  GI/: Adequate urine output. BM X1 per pt  Diet/appetite: Tolerating reg diet. Eating well.  Activity:  Independent, up to chair.  Pain: denies pain   Skin: Intact, no new deficits noted.        Plan: Continue with POC. Notify primary team with changes. Plans for OR next week for thoracic stent graft

## 2018-02-09 NOTE — PROGRESS NOTES
"VASCULAR SURGERY PROGRESS NOTE    SUBJECTIVE:  Patient sitting up in chair, denies any abdominal or back pain. Offers no specific complaints. Received 2 doses of prn Labetalol overnight.     OBJECTIVE:  Vital signs:  /63  Pulse 67  Temp 97.8  F (36.6  C) (Oral)  Resp 19  Ht 1.448 m (4' 9\")  Wt 64.1 kg (141 lb 5 oz)  SpO2 98%  BMI 30.58 kg/m2        Intake/Output Summary (Last 24 hours) at 02/09/18 0758  Last data filed at 02/09/18 0400   Gross per 24 hour   Intake              850 ml   Output              800 ml   Net               50 ml     Vitals:    02/04/18 0300 02/05/18 0200 02/09/18 0400   Weight: 66.6 kg (146 lb 13.2 oz) 64.6 kg (142 lb 6.7 oz) 64.1 kg (141 lb 5 oz)       PHYSICAL EXAM:  NEURO/PSYCH: The patient is alert and oriented.  Appropriate.  Moves all extremities.    SKIN:  Warm and dry.  CARDIOLOGY: regular rate and rhythm, S1 S2 present, no murmur   PULMONARY:clear to auscultation bilaterally,  non-labored breathing  EXTREMITIES: warm and well perfused    BMP    Recent Labs  Lab 02/09/18 0457 02/08/18 2050 02/07/18  0526 02/05/18 0452 02/04/18  0342 02/03/18  0908     --  140 141 138 136   POTASSIUM 3.9 3.8 3.8 3.5 4.4 3.7   CHLORIDE 106  --  108 110* 110* 108   CO2 23  --  24 20 16* 18*   BUN 22  --  13 9 10 21   CR 0.95  --  0.86 0.82 0.73 0.65*   *  --  134* 106* 172* 152*   MAG  --   --  2.1 1.8 2.1 1.8   PHOS  --   --  4.5 3.1  --  3.4     CBC    Recent Labs  Lab 02/09/18 0457 02/08/18 2050 02/05/18 0452 02/03/18  0908   WBC 8.2  --  7.8 11.1*   HGB 12.3*  --  11.2* 12.0*    262 214 229     INR/PTT    Recent Labs  Lab 02/03/18  0908   INR 1.02   PTT 34         CT:  Exam: Computed tomographic angiography of the chest, abdomen and  pelvis without and with contrast including 3D reformations dated  2/6/2018 4:01 PM    Impression:  1. 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 No  subdiaphragmatic extension. There is aneurysmal dilatation of the  ascending and descending aorta with maximum diameters of 4.1 and 3.8  cm, respectively.  2. Fusiform aneurysm of the distal abdominal aorta extending into the  aortic bifurcation, with a maximum diameter of 4.0 cm.  3. New small left-sided pleural effusion.  4. Mild cardiomegaly.  5. Indeterminate 10 mm hypodensity in the right mid renal pole and 1.3  cm hypodensity in the right lower renal pole. Consider renal  ultrasound for further characterization.        ASSESSMENT:  47 year old male with type B thoracic aortic dissection, intramural hematoma.  Currently receiving Amlodipine 10 mg daily, Lisinopril 10 mg BID, Labetalol 600 mg every 8 hours for goal of systolic BP < 120.  CTA scan done 2/6/18 demonstrated stable [place] type B dissection with large penetrating aortic ulcer in the mid-segment.       PLAN:  - continue current oral antihypertension medication regimen with prn Labetalol   - continue target BP less than 120 systolic and heart rate less than 60  - plans for OR next week for thoracic stent graft, scheduled  to be in OR case to assist with translation   - continue heparin SQ for DVT prophylaxis      Holly MATTHEWS, CNS  Division of Vascular Surgery  [facility]  Pager [phone]

## 2018-02-09 NOTE — PROGRESS NOTES
Transfer  Transferred from: 4A  Via:bed  Reason for transfer:Pt appropriate for 6B- improved patient condition  Family: Pt notified family  Belongings: Received with pt (bag on window sill)  Chart: Received with pt  Medications: Meds received from old unit with pt  2 RN Skin Assessment Completed By: Samra Ellis and Stella Mejia  Report received from: Neo Garcia RN  Pt status: Stable

## 2018-02-09 NOTE — PLAN OF CARE
Problem: Patient Care Overview  Goal: Plan of Care/Patient Progress Review  Outcome: No Change  A: Alert & Oriented, minimal conversation d/t language barrier, blue phone utilized despite pt refusing to use it through majority of shift. Pt does speak minimal english. VS unchanged, goal HR <60 & SBP <120 with prn labetalol and scheduled. SR. Afebrile. Denies pain (and chest pain) and nausea. Regular diet. Refusing 0200 BG check. Voiding via urinal. 1 BM overnight. Gave 20 meq K overnight, plan to recheck tomorrow AM per protocol. Continue with frequent education on medication compliance and importance of monitoring HR/BP. Able to make needs known via call light.     I/O this shift:  In: 100 [P.O.:100]  Out: 250 [Urine:250]    Temp:  [97.3  F (36.3  C)-99.1  F (37.3  C)] 98.7  F (37.1  C)  Pulse:  [67] 67  Heart Rate:  [57-78] 60  Resp:  [15-59] 19  BP: ()/(35-96) 114/74  SpO2:  [94 %-100 %] 98 %     R: Continue with POC. Notify primary team with changes.

## 2018-02-09 NOTE — PLAN OF CARE
Problem: Patient Care Overview  Goal: Plan of Care/Patient Progress Review  Outcome: Improving  NEURO: A&Ox4  TELE: SR--HR maintained in low 60s all shift  VITALS: Stable, SBP< 120 all shift  CV: RRR, no edema, pulses strong  PULM: LS CTA  GI: BS +, good appetite, no BM  : Po fluids encouraged.  UOP adequate.  SKIN: Intact  LDA: PIV  GTT: None  PAIN: Denies pain, specifically CP  ACTIVITY: SBA  PLAN: Plan for thoracic stenting

## 2018-02-10 LAB
GLUCOSE BLDC GLUCOMTR-MCNC: 133 MG/DL (ref 70–99)
GLUCOSE BLDC GLUCOMTR-MCNC: 133 MG/DL (ref 70–99)
GLUCOSE BLDC GLUCOMTR-MCNC: 148 MG/DL (ref 70–99)
GLUCOSE BLDC GLUCOMTR-MCNC: 157 MG/DL (ref 70–99)
GLUCOSE BLDC GLUCOMTR-MCNC: 195 MG/DL (ref 70–99)
MAGNESIUM SERPL-MCNC: 2.1 MG/DL (ref 1.6–2.3)
PHOSPHATE SERPL-MCNC: 4 MG/DL (ref 2.5–4.5)
POTASSIUM SERPL-SCNC: 4.3 MMOL/L (ref 3.4–5.3)

## 2018-02-10 PROCEDURE — 00000146 ZZHCL STATISTIC GLUCOSE BY METER IP

## 2018-02-10 PROCEDURE — 83735 ASSAY OF MAGNESIUM: CPT | Performed by: SURGERY

## 2018-02-10 PROCEDURE — 84100 ASSAY OF PHOSPHORUS: CPT | Performed by: SURGERY

## 2018-02-10 PROCEDURE — 93010 ELECTROCARDIOGRAM REPORT: CPT | Performed by: INTERNAL MEDICINE

## 2018-02-10 PROCEDURE — 25000132 ZZH RX MED GY IP 250 OP 250 PS 637: Performed by: NURSE PRACTITIONER

## 2018-02-10 PROCEDURE — 25000132 ZZH RX MED GY IP 250 OP 250 PS 637: Performed by: SURGERY

## 2018-02-10 PROCEDURE — 93005 ELECTROCARDIOGRAM TRACING: CPT

## 2018-02-10 PROCEDURE — 12000006 ZZH R&B IMCU INTERMEDIATE UMMC

## 2018-02-10 PROCEDURE — 40000275 ZZH STATISTIC RCP TIME EA 10 MIN

## 2018-02-10 PROCEDURE — 84132 ASSAY OF SERUM POTASSIUM: CPT | Performed by: SURGERY

## 2018-02-10 PROCEDURE — 36415 COLL VENOUS BLD VENIPUNCTURE: CPT | Performed by: SURGERY

## 2018-02-10 PROCEDURE — 25000132 ZZH RX MED GY IP 250 OP 250 PS 637: Performed by: PHYSICIAN ASSISTANT

## 2018-02-10 PROCEDURE — 25000128 H RX IP 250 OP 636: Performed by: SURGERY

## 2018-02-10 RX ADMIN — HEPARIN SODIUM 5000 UNITS: 5000 INJECTION, SOLUTION INTRAVENOUS; SUBCUTANEOUS at 14:06

## 2018-02-10 RX ADMIN — Medication 600 MG: at 06:07

## 2018-02-10 RX ADMIN — SENNOSIDES AND DOCUSATE SODIUM 1 TABLET: 8.6; 5 TABLET ORAL at 21:20

## 2018-02-10 RX ADMIN — LISINOPRIL 10 MG: 10 TABLET ORAL at 07:57

## 2018-02-10 RX ADMIN — HEPARIN SODIUM 5000 UNITS: 5000 INJECTION, SOLUTION INTRAVENOUS; SUBCUTANEOUS at 06:09

## 2018-02-10 RX ADMIN — LISINOPRIL 10 MG: 10 TABLET ORAL at 20:20

## 2018-02-10 RX ADMIN — SENNOSIDES AND DOCUSATE SODIUM 1 TABLET: 8.6; 5 TABLET ORAL at 07:56

## 2018-02-10 RX ADMIN — FLUOXETINE HYDROCHLORIDE 20 MG: 10 CAPSULE ORAL at 07:56

## 2018-02-10 RX ADMIN — Medication 600 MG: at 21:20

## 2018-02-10 RX ADMIN — Medication 600 MG: at 14:06

## 2018-02-10 RX ADMIN — HEPARIN SODIUM 5000 UNITS: 5000 INJECTION, SOLUTION INTRAVENOUS; SUBCUTANEOUS at 21:21

## 2018-02-10 RX ADMIN — AMLODIPINE BESYLATE 10 MG: 10 TABLET ORAL at 07:56

## 2018-02-10 RX ADMIN — DILTIAZEM HYDROCHLORIDE 240 MG: 240 CAPSULE, EXTENDED RELEASE ORAL at 07:56

## 2018-02-10 ASSESSMENT — ACTIVITIES OF DAILY LIVING (ADL)
ADLS_ACUITY_SCORE: 11

## 2018-02-10 NOTE — PROVIDER NOTIFICATION
Vascular team notified of prolonged MI interval ranging 0.18-0.22. 12 lead ordered per Neo Zazueta. BP stable, bradycardic, and patient asymptomatic. Continuing to monitor patient and will notify team of any new changes.

## 2018-02-10 NOTE — PROVIDER NOTIFICATION
Dr. Chris Zazueta paged to discuss PRN IV labetalol HR parameters. Per Dr. Zazueta, okay to not give if HR is sustaining in the 60's but if starts getting above 70 then give. Priority is to keep SBP <120. This writer verbalized understanding.

## 2018-02-10 NOTE — PROGRESS NOTES
No acute events, denies pain  Occasionally refuses lab draws  BP controlled overnight    B/P: 109/73, T: 98.3, P: 67, R: 20  Alert oriented no acute distress  Lung CTA bilaterally, RRR  Palp DP/PT bilaterally    WBC   Date Value Ref Range Status   02/09/2018 8.2 4.0 - 11.0 10e9/L Final   ]  Hemoglobin   Date Value Ref Range Status   02/09/2018 12.3 (L) 13.3 - 17.7 g/dL Final   ]  INR/Prothrombin Time  Creatinine   Date Value Ref Range Status   02/09/2018 0.95 0.66 - 1.25 mg/dL Final   ]      Intake/Output Summary (Last 24 hours) at 02/10/18 0813  Last data filed at 02/10/18 0800   Gross per 24 hour   Intake              870 ml   Output                0 ml   Net              870 ml       Assessment/Plan:  48 yo male with penetrating aortic ulcer and hematoma    Continue BP control with goal SBP <120 and HR <60  Plan for TEVAR Tuesday  Diet as tolerated  OK to ambulate with assistance    Marilyn Jacques MD  Vascular Surgery Fellow  Pager [phone]

## 2018-02-10 NOTE — PLAN OF CARE
Problem: Patient Care Overview  Goal: Plan of Care/Patient Progress Review  Outcome: Improving  Neuro: A&Ox4. No new deficits. Hmong  used and still needed to communicate.   Cardiac: SR. Intermittent bradycardia but desired HR<70 and SBP<120. MO interval noted as 0.22 and vascular team notified. 12 Lead tracing ordered and new diagnosed 1st degree AV block. SBP maintained <120 no PRN labetalol given. VSS.   Respiratory: Sating 98% on RA. Lung sounds clear.   GI/: No bowel movement or urine output noted during shift.  Diet/appetite: Tolerating regular diet. Eating well.  Activity:  Independent.   Pain: Denies.  Skin: Marked and bordered bruise on left forearm with no enlargement. Firm hematoma felt and team aware.  LDA's: RPIV saline locked.    Plan: Continue with POC. Maintain SBP<120 and HR 60-70s. Notify primary team with changes.

## 2018-02-10 NOTE — PLAN OF CARE
No acute issues on this evening shift. Minimal complaints. Monitoring HR and BP closely to ensure parameters are met. No PRN IV labetalol necessary this shift. Blood sugar elevated this afternoon secondary to patient eating prior to blood sugar check. Blood sugars improved at 2200 check = 83. Night shift to check again at 0200. Refused 2200 dose of SQ heparin despite education. Used  phone for all communication.    Report given to oncoming RN who will assume care over the night shift.

## 2018-02-11 LAB
GLUCOSE BLDC GLUCOMTR-MCNC: 126 MG/DL (ref 70–99)
GLUCOSE BLDC GLUCOMTR-MCNC: 134 MG/DL (ref 70–99)
GLUCOSE BLDC GLUCOMTR-MCNC: 143 MG/DL (ref 70–99)

## 2018-02-11 PROCEDURE — 00000146 ZZHCL STATISTIC GLUCOSE BY METER IP

## 2018-02-11 PROCEDURE — 25000132 ZZH RX MED GY IP 250 OP 250 PS 637: Performed by: NURSE PRACTITIONER

## 2018-02-11 PROCEDURE — 25000132 ZZH RX MED GY IP 250 OP 250 PS 637: Performed by: PHYSICIAN ASSISTANT

## 2018-02-11 PROCEDURE — 25000132 ZZH RX MED GY IP 250 OP 250 PS 637: Performed by: SURGERY

## 2018-02-11 PROCEDURE — 12000006 ZZH R&B IMCU INTERMEDIATE UMMC

## 2018-02-11 PROCEDURE — 25000128 H RX IP 250 OP 636: Performed by: SURGERY

## 2018-02-11 RX ADMIN — FLUOXETINE HYDROCHLORIDE 20 MG: 10 CAPSULE ORAL at 09:27

## 2018-02-11 RX ADMIN — LISINOPRIL 10 MG: 10 TABLET ORAL at 09:27

## 2018-02-11 RX ADMIN — SENNOSIDES AND DOCUSATE SODIUM 1 TABLET: 8.6; 5 TABLET ORAL at 09:28

## 2018-02-11 RX ADMIN — Medication 600 MG: at 06:16

## 2018-02-11 RX ADMIN — DILTIAZEM HYDROCHLORIDE 240 MG: 240 CAPSULE, EXTENDED RELEASE ORAL at 09:27

## 2018-02-11 RX ADMIN — Medication 600 MG: at 14:05

## 2018-02-11 RX ADMIN — Medication 600 MG: at 21:59

## 2018-02-11 RX ADMIN — HEPARIN SODIUM 5000 UNITS: 5000 INJECTION, SOLUTION INTRAVENOUS; SUBCUTANEOUS at 06:19

## 2018-02-11 RX ADMIN — AMLODIPINE BESYLATE 10 MG: 10 TABLET ORAL at 09:27

## 2018-02-11 RX ADMIN — HEPARIN SODIUM 5000 UNITS: 5000 INJECTION, SOLUTION INTRAVENOUS; SUBCUTANEOUS at 22:01

## 2018-02-11 RX ADMIN — HEPARIN SODIUM 5000 UNITS: 5000 INJECTION, SOLUTION INTRAVENOUS; SUBCUTANEOUS at 14:05

## 2018-02-11 RX ADMIN — SENNOSIDES AND DOCUSATE SODIUM 1 TABLET: 8.6; 5 TABLET ORAL at 21:59

## 2018-02-11 RX ADMIN — LISINOPRIL 10 MG: 10 TABLET ORAL at 19:50

## 2018-02-11 ASSESSMENT — ACTIVITIES OF DAILY LIVING (ADL)
ADLS_ACUITY_SCORE: 11

## 2018-02-11 NOTE — PLAN OF CARE
Problem: Patient Care Overview  Goal: Plan of Care/Patient Progress Review  Outcome: No change.   Neuro: A&O x4. Hmong  utilized for communication.   Cardiac: SR/SB with first degree AV block. SBP under 120 this shift. No PRN labetalol given. HR maintained 50-70.      Respiratory: O2 sats high 90's on RA.   GI/: Voiding without saving. No BM.   Diet/appetite: Good appetite.   Activity: Up ad annabelle to bathroom.   Pain: Denies.   Skin: Bruise with hematoma to left forearm marked. No changes. Team aware of bruise.   LDA's: R PIV SL.      Plan: Keep SBP <120 and HR <70. Continue with POC.

## 2018-02-11 NOTE — PROGRESS NOTES
No acute events, denies pain  No prn meds required to meet BP/HR goals    B/P: 122/73, T: 98.7, P: 67, R: 12  Alert oriented no acute distress  CTA bilaterally  RRR  +DP/PT bilaterally    WBC   Date Value Ref Range Status   02/09/2018 8.2 4.0 - 11.0 10e9/L Final   ]  Hemoglobin   Date Value Ref Range Status   02/09/2018 12.3 (L) 13.3 - 17.7 g/dL Final   ]  INR/Prothrombin Time  Creatinine   Date Value Ref Range Status   02/09/2018 0.95 0.66 - 1.25 mg/dL Final   ]      Intake/Output Summary (Last 24 hours) at 02/11/18 0619  Last data filed at 02/11/18 0000   Gross per 24 hour   Intake           821.33 ml   Output              300 ml   Net           521.33 ml       Assessment/Plan:  48 yo male with penetrating aortic ulcer with associated hematoma     Continue BP control with goal SBP <120 and HR <60, prn labetalol as need but has been controlled with current regimen  Plan for TEVAR Tuesday, will coordinate  and family discussion tomorrow  Diet as tolerated  OK to ambulate with assistance    Marilyn Jacques MD  Vascular Surgery Fellow  Pager [phone]

## 2018-02-11 NOTE — PLAN OF CARE
Problem: Patient Care Overview  Goal: Plan of Care/Patient Progress Review  A/o x 4 using Hmong blue  phone. Up ad annabelle. Denies pain.    Sinus hugo with 1st degree AV block. SBP goal < 120, no PRNs needed, palpable pulses radial and DP, afebrile    Room air    Regular diet. LBM 2/9. Abdomen soft, nondistended.    Voids in toilet or urinal, clear yellow.    Plan for TEVAR Tuesday.

## 2018-02-11 NOTE — PLAN OF CARE
Problem: Patient Care Overview  Goal: Plan of Care/Patient Progress Review  Outcome: No Change  Neuro: Assessed with  this AM: intact. Denies pain.  Cardio: Goal SBP<120 HR <60. No PRN given, maintained with scheduled medications.  Resp: RA  GI: Regular diet. Last BM 2/9  : Voiding in toilet. Explained urinal with .  Skin: L arm bruise, otherwise intact.  Plan: TEVAR Tuesday.

## 2018-02-11 NOTE — PLAN OF CARE
Problem: Aortic Aneurysm/Dissection Repair (Adult)  Intervention: Maintain Blood Pressure Within Desired Range  maintaing SBP < 120 on scheduled 10 mg lisinopril BID, 600 mg Labetalol PO q 8 hours, Norvasc 10 mg daily, cardizem 240 mg daily. No overnight PRN labetalol given. HR 52-67

## 2018-02-12 ENCOUNTER — APPOINTMENT (OUTPATIENT)
Dept: CT IMAGING | Facility: CLINIC | Age: 48
End: 2018-02-12
Attending: CLINICAL NURSE SPECIALIST
Payer: COMMERCIAL

## 2018-02-12 LAB
GLUCOSE BLDC GLUCOMTR-MCNC: 119 MG/DL (ref 70–99)
GLUCOSE BLDC GLUCOMTR-MCNC: 151 MG/DL (ref 70–99)
GLUCOSE BLDC GLUCOMTR-MCNC: 158 MG/DL (ref 70–99)
GLUCOSE BLDC GLUCOMTR-MCNC: 159 MG/DL (ref 70–99)
GLUCOSE BLDC GLUCOMTR-MCNC: 98 MG/DL (ref 70–99)
INTERPRETATION ECG - MUSE: NORMAL

## 2018-02-12 PROCEDURE — 86850 RBC ANTIBODY SCREEN: CPT | Performed by: SURGERY

## 2018-02-12 PROCEDURE — 25000132 ZZH RX MED GY IP 250 OP 250 PS 637: Performed by: PHYSICIAN ASSISTANT

## 2018-02-12 PROCEDURE — 25000128 H RX IP 250 OP 636: Performed by: RADIOLOGY

## 2018-02-12 PROCEDURE — 00000146 ZZHCL STATISTIC GLUCOSE BY METER IP

## 2018-02-12 PROCEDURE — 71275 CT ANGIOGRAPHY CHEST: CPT

## 2018-02-12 PROCEDURE — 86923 COMPATIBILITY TEST ELECTRIC: CPT | Performed by: SURGERY

## 2018-02-12 PROCEDURE — T1013 SIGN LANG/ORAL INTERPRETER: HCPCS | Mod: U3

## 2018-02-12 PROCEDURE — 25000132 ZZH RX MED GY IP 250 OP 250 PS 637: Performed by: NURSE PRACTITIONER

## 2018-02-12 PROCEDURE — 12000006 ZZH R&B IMCU INTERMEDIATE UMMC

## 2018-02-12 PROCEDURE — 86901 BLOOD TYPING SEROLOGIC RH(D): CPT | Performed by: SURGERY

## 2018-02-12 PROCEDURE — 25000132 ZZH RX MED GY IP 250 OP 250 PS 637: Performed by: SURGERY

## 2018-02-12 PROCEDURE — 25000128 H RX IP 250 OP 636: Performed by: SURGERY

## 2018-02-12 PROCEDURE — 86900 BLOOD TYPING SEROLOGIC ABO: CPT | Performed by: SURGERY

## 2018-02-12 RX ORDER — IOPAMIDOL 755 MG/ML
100 INJECTION, SOLUTION INTRAVASCULAR ONCE
Status: COMPLETED | OUTPATIENT
Start: 2018-02-12 | End: 2018-02-12

## 2018-02-12 RX ORDER — CEFAZOLIN SODIUM 2 G/100ML
2 INJECTION, SOLUTION INTRAVENOUS
Status: COMPLETED | OUTPATIENT
Start: 2018-02-13 | End: 2018-02-13

## 2018-02-12 RX ADMIN — Medication 600 MG: at 06:23

## 2018-02-12 RX ADMIN — SENNOSIDES AND DOCUSATE SODIUM 1 TABLET: 8.6; 5 TABLET ORAL at 22:11

## 2018-02-12 RX ADMIN — DILTIAZEM HYDROCHLORIDE 240 MG: 240 CAPSULE, EXTENDED RELEASE ORAL at 08:51

## 2018-02-12 RX ADMIN — HEPARIN SODIUM 5000 UNITS: 5000 INJECTION, SOLUTION INTRAVENOUS; SUBCUTANEOUS at 06:24

## 2018-02-12 RX ADMIN — FLUOXETINE HYDROCHLORIDE 20 MG: 10 CAPSULE ORAL at 08:51

## 2018-02-12 RX ADMIN — LISINOPRIL 10 MG: 10 TABLET ORAL at 08:51

## 2018-02-12 RX ADMIN — Medication 600 MG: at 13:52

## 2018-02-12 RX ADMIN — Medication 600 MG: at 22:11

## 2018-02-12 RX ADMIN — HEPARIN SODIUM 5000 UNITS: 5000 INJECTION, SOLUTION INTRAVENOUS; SUBCUTANEOUS at 22:11

## 2018-02-12 RX ADMIN — LISINOPRIL 10 MG: 10 TABLET ORAL at 20:35

## 2018-02-12 RX ADMIN — IOPAMIDOL 100 ML: 755 INJECTION, SOLUTION INTRAVENOUS at 15:13

## 2018-02-12 RX ADMIN — AMLODIPINE BESYLATE 10 MG: 10 TABLET ORAL at 08:50

## 2018-02-12 ASSESSMENT — ACTIVITIES OF DAILY LIVING (ADL)
ADLS_ACUITY_SCORE: 11

## 2018-02-12 NOTE — PROGRESS NOTES
"VASCULAR SURGERY PROGRESS NOTE    SUBJECTIVE:  Patient laying in bed, denies any abdominal or back pain.  Offers no specific complaints.    OBJECTIVE:  Vital signs:  /74 (BP Location: Right arm)  Pulse 61  Temp 98.9  F (37.2  C) (Oral)  Resp 16  Ht 1.448 m (4' 9\")  Wt 62.1 kg (136 lb 14.5 oz)  SpO2 98%  BMI 29.63 kg/m2      Intake/Output Summary (Last 24 hours) at 02/12/18 0734  Last data filed at 02/12/18 0600   Gross per 24 hour   Intake             1160 ml   Output                0 ml   Net             1160 ml       Vitals:    02/09/18 0400 02/10/18 0400 02/11/18 0557   Weight: 64.1 kg (141 lb 5 oz) 62.8 kg (138 lb 6.4 oz) 62.1 kg (136 lb 14.5 oz)       PHYSICAL EXAM:  NEURO/PSYCH: The patient is alert and oriented.  Appropriate.  Moves all extremities.    SKIN:  Warm and dry.  PULMONARY: non-labored breathing, not requiring supplemental oxygen   EXTREMITIES: warm and well perfused    BMP    Recent Labs  Lab 02/10/18  0814 02/09/18 0457 02/08/18 2050 02/07/18  0526   NA  --  137  --  140   POTASSIUM 4.3 3.9 3.8 3.8   CHLORIDE  --  106  --  108   CO2  --  23  --  24   BUN  --  22  --  13   CR  --  0.95  --  0.86   GLC  --  162*  --  134*   MAG 2.1  --   --  2.1   PHOS 4.0  --   --  4.5     CBC    Recent Labs  Lab 02/09/18 0457 02/08/18 2050   WBC 8.2  --    HGB 12.3*  --     262     INR/PTT  No lab results found in last 7 days.      CT:  Exam: Computed tomographic angiography of the chest, abdomen and  pelvis without and with contrast including 3D reformations dated  2/6/2018 4:01 PM    Impression:  1. 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 No  subdiaphragmatic extension. There is aneurysmal dilatation of the  ascending and descending aorta with maximum " diameters of 4.1 and 3.8  cm, respectively.  2. Fusiform aneurysm of the distal abdominal aorta extending into the  aortic bifurcation, with a maximum diameter of 4.0 cm.  3. New small left-sided pleural effusion.  4. Mild cardiomegaly.  5. Indeterminate 10 mm hypodensity in the right mid renal pole and 1.3  cm hypodensity in the right lower renal pole. Consider renal  ultrasound for further characterization.        ASSESSMENT:  47 year old male with type B thoracic aortic dissection, intramural hematoma.  Currently receiving Amlodipine 10 mg daily, Lisinopril 10 mg BID, Labetalol 600 mg every 8 hours for goal of systolic BP < 120.  CTA scan done 2/6/18 demonstrated stable [place] type B dissection with large penetrating aortic ulcer in the mid-segment.       PLAN:  - continue current oral antihypertension medication regimen , patient has not required labetalol since 2/9/18   - continue target BP less than 120 systolic and heart rate less than 60  - will repeat CT scan today   - plans for OR tomorrow for thoracic stent graft, scheduled  to be in OR case to assist with translation   - continue heparin SQ for DVT prophylaxis, do not stop for OR   - will obtain consent for OR tomorrow later today, awaiting  arrival   - pre-op orders placed  - NPO after midnight       Holly MATTHEWS, CNS  Division of Vascular Surgery  [facility]  Pager [phone]

## 2018-02-13 ENCOUNTER — OFFICE VISIT (OUTPATIENT)
Dept: INTERPRETER SERVICES | Facility: CLINIC | Age: 48
End: 2018-02-13
Payer: COMMERCIAL

## 2018-02-13 ENCOUNTER — ANESTHESIA (OUTPATIENT)
Dept: SURGERY | Facility: CLINIC | Age: 48
End: 2018-02-13
Payer: COMMERCIAL

## 2018-02-13 ENCOUNTER — APPOINTMENT (OUTPATIENT)
Dept: INTERVENTIONAL RADIOLOGY/VASCULAR | Facility: CLINIC | Age: 48
End: 2018-02-13
Attending: SURGERY
Payer: COMMERCIAL

## 2018-02-13 ENCOUNTER — ANESTHESIA EVENT (OUTPATIENT)
Dept: SURGERY | Facility: CLINIC | Age: 48
End: 2018-02-13
Payer: COMMERCIAL

## 2018-02-13 PROBLEM — I71.9 PENETRATING ULCER OF AORTA (H): Status: ACTIVE | Noted: 2018-02-13

## 2018-02-13 LAB
ABO + RH BLD: NORMAL
ABO + RH BLD: NORMAL
BASOPHILS # BLD AUTO: 0 10E9/L (ref 0–0.2)
BASOPHILS NFR BLD AUTO: 0.4 %
BLD GP AB SCN SERPL QL: NORMAL
BLD PROD TYP BPU: NORMAL
BLOOD BANK CMNT PATIENT-IMP: NORMAL
CREAT SERPL-MCNC: 1.08 MG/DL (ref 0.66–1.25)
DIFFERENTIAL METHOD BLD: ABNORMAL
EOSINOPHIL # BLD AUTO: 0.2 10E9/L (ref 0–0.7)
EOSINOPHIL NFR BLD AUTO: 3.1 %
ERYTHROCYTE [DISTWIDTH] IN BLOOD BY AUTOMATED COUNT: 13.3 % (ref 10–15)
GFR SERPL CREATININE-BSD FRML MDRD: 74 ML/MIN/1.7M2
GLUCOSE BLDC GLUCOMTR-MCNC: 107 MG/DL (ref 70–99)
GLUCOSE BLDC GLUCOMTR-MCNC: 120 MG/DL (ref 70–99)
GLUCOSE BLDC GLUCOMTR-MCNC: 129 MG/DL (ref 70–99)
GLUCOSE BLDC GLUCOMTR-MCNC: 136 MG/DL (ref 70–99)
GLUCOSE BLDC GLUCOMTR-MCNC: 162 MG/DL (ref 70–99)
GLUCOSE BLDC GLUCOMTR-MCNC: 168 MG/DL (ref 70–99)
HCT VFR BLD AUTO: 39.1 % (ref 40–53)
HGB BLD-MCNC: 12.6 G/DL (ref 13.3–17.7)
IMM GRANULOCYTES # BLD: 0 10E9/L (ref 0–0.4)
IMM GRANULOCYTES NFR BLD: 0.4 %
INR PPP: 1.04 (ref 0.86–1.14)
KCT BLD-ACNC: 200 SEC (ref 105–167)
KCT BLD-ACNC: 204 SEC (ref 105–167)
LYMPHOCYTES # BLD AUTO: 1.5 10E9/L (ref 0.8–5.3)
LYMPHOCYTES NFR BLD AUTO: 19.6 %
MCH RBC QN AUTO: 31.3 PG (ref 26.5–33)
MCHC RBC AUTO-ENTMCNC: 32.2 G/DL (ref 31.5–36.5)
MCV RBC AUTO: 97 FL (ref 78–100)
MONOCYTES # BLD AUTO: 0.6 10E9/L (ref 0–1.3)
MONOCYTES NFR BLD AUTO: 8 %
NEUTROPHILS # BLD AUTO: 5.4 10E9/L (ref 1.6–8.3)
NEUTROPHILS NFR BLD AUTO: 68.5 %
NRBC # BLD AUTO: 0 10*3/UL
NRBC BLD AUTO-RTO: 0 /100
NUM BPU REQUESTED: 2
PLATELET # BLD AUTO: 234 10E9/L (ref 150–450)
POTASSIUM SERPL-SCNC: 4.3 MMOL/L (ref 3.4–5.3)
RBC # BLD AUTO: 4.03 10E12/L (ref 4.4–5.9)
SPECIMEN EXP DATE BLD: NORMAL
WBC # BLD AUTO: 7.9 10E9/L (ref 4–11)

## 2018-02-13 PROCEDURE — 25000132 ZZH RX MED GY IP 250 OP 250 PS 637: Performed by: SURGERY

## 2018-02-13 PROCEDURE — 36415 COLL VENOUS BLD VENIPUNCTURE: CPT | Performed by: SURGERY

## 2018-02-13 PROCEDURE — 84132 ASSAY OF SERUM POTASSIUM: CPT | Performed by: SURGERY

## 2018-02-13 PROCEDURE — 25000128 H RX IP 250 OP 636: Performed by: SURGERY

## 2018-02-13 PROCEDURE — 36000070 ZZH SURGERY LEVEL 5 EA 15 ADDTL MIN - UMMC: Performed by: SURGERY

## 2018-02-13 PROCEDURE — C1887 CATHETER, GUIDING: HCPCS | Performed by: SURGERY

## 2018-02-13 PROCEDURE — 25000125 ZZHC RX 250: Performed by: NURSE ANESTHETIST, CERTIFIED REGISTERED

## 2018-02-13 PROCEDURE — 82565 ASSAY OF CREATININE: CPT | Performed by: SURGERY

## 2018-02-13 PROCEDURE — 37000008 ZZH ANESTHESIA TECHNICAL FEE, 1ST 30 MIN: Performed by: SURGERY

## 2018-02-13 PROCEDURE — 02VX3DZ RESTRICTION OF THORACIC AORTA, ASCENDING/ARCH WITH INTRALUMINAL DEVICE, PERCUTANEOUS APPROACH: ICD-10-PCS | Performed by: SURGERY

## 2018-02-13 PROCEDURE — 40000170 ZZH STATISTIC PRE-PROCEDURE ASSESSMENT II: Performed by: SURGERY

## 2018-02-13 PROCEDURE — C1894 INTRO/SHEATH, NON-LASER: HCPCS | Performed by: SURGERY

## 2018-02-13 PROCEDURE — T1013 SIGN LANG/ORAL INTERPRETER: HCPCS | Mod: U3

## 2018-02-13 PROCEDURE — B340ZZ3 ULTRASONOGRAPHY OF THORACIC AORTA, INTRAVASCULAR: ICD-10-PCS | Performed by: SURGERY

## 2018-02-13 PROCEDURE — 27210794 ZZH OR GENERAL SUPPLY STERILE: Performed by: SURGERY

## 2018-02-13 PROCEDURE — 36000072 ZZH SURGERY LEVEL 5 W FLUORO 1ST 30 MIN - UMMC: Performed by: SURGERY

## 2018-02-13 PROCEDURE — 25000128 H RX IP 250 OP 636: Performed by: CLINICAL NURSE SPECIALIST

## 2018-02-13 PROCEDURE — 27810169 ZZH OR IMPLANT GENERAL: Performed by: SURGERY

## 2018-02-13 PROCEDURE — B440ZZ3 ULTRASONOGRAPHY OF ABDOMINAL AORTA, INTRAVASCULAR: ICD-10-PCS | Performed by: SURGERY

## 2018-02-13 PROCEDURE — 71000014 ZZH RECOVERY PHASE 1 LEVEL 2 FIRST HR: Performed by: SURGERY

## 2018-02-13 PROCEDURE — P9016 RBC LEUKOCYTES REDUCED: HCPCS | Performed by: SURGERY

## 2018-02-13 PROCEDURE — 20000004 ZZH R&B ICU UMMC

## 2018-02-13 PROCEDURE — C1753 CATH, INTRAVAS ULTRASOUND: HCPCS | Performed by: SURGERY

## 2018-02-13 PROCEDURE — 25000132 ZZH RX MED GY IP 250 OP 250 PS 637: Performed by: NURSE PRACTITIONER

## 2018-02-13 PROCEDURE — 25000132 ZZH RX MED GY IP 250 OP 250 PS 637: Performed by: PHYSICIAN ASSISTANT

## 2018-02-13 PROCEDURE — 00000146 ZZHCL STATISTIC GLUCOSE BY METER IP

## 2018-02-13 PROCEDURE — 25000125 ZZHC RX 250: Performed by: SURGERY

## 2018-02-13 PROCEDURE — 86901 BLOOD TYPING SEROLOGIC RH(D): CPT | Performed by: CLINICAL NURSE SPECIALIST

## 2018-02-13 PROCEDURE — 85610 PROTHROMBIN TIME: CPT | Performed by: SURGERY

## 2018-02-13 PROCEDURE — 85347 COAGULATION TIME ACTIVATED: CPT

## 2018-02-13 PROCEDURE — 25000128 H RX IP 250 OP 636: Performed by: NURSE ANESTHETIST, CERTIFIED REGISTERED

## 2018-02-13 PROCEDURE — 40000003 ZZH STATISTIC IR STAFF TIME IN THE OR

## 2018-02-13 PROCEDURE — 85025 COMPLETE CBC W/AUTO DIFF WBC: CPT | Performed by: SURGERY

## 2018-02-13 PROCEDURE — 37000009 ZZH ANESTHESIA TECHNICAL FEE, EACH ADDTL 15 MIN: Performed by: SURGERY

## 2018-02-13 PROCEDURE — C1769 GUIDE WIRE: HCPCS | Performed by: SURGERY

## 2018-02-13 DEVICE — IMPLANTABLE DEVICE: Type: IMPLANTABLE DEVICE | Site: AORTA | Status: FUNCTIONAL

## 2018-02-13 RX ORDER — FENTANYL CITRATE 50 UG/ML
INJECTION, SOLUTION INTRAMUSCULAR; INTRAVENOUS PRN
Status: DISCONTINUED | OUTPATIENT
Start: 2018-02-13 | End: 2018-02-13

## 2018-02-13 RX ORDER — BUPIVACAINE HYDROCHLORIDE 5 MG/ML
INJECTION, SOLUTION PERINEURAL PRN
Status: DISCONTINUED | OUTPATIENT
Start: 2018-02-13 | End: 2018-02-13 | Stop reason: HOSPADM

## 2018-02-13 RX ORDER — PROPOFOL 10 MG/ML
INJECTION, EMULSION INTRAVENOUS CONTINUOUS PRN
Status: DISCONTINUED | OUTPATIENT
Start: 2018-02-13 | End: 2018-02-13

## 2018-02-13 RX ORDER — ONDANSETRON 4 MG/1
4 TABLET, ORALLY DISINTEGRATING ORAL EVERY 6 HOURS PRN
Status: DISCONTINUED | OUTPATIENT
Start: 2018-02-13 | End: 2018-02-16 | Stop reason: HOSPADM

## 2018-02-13 RX ORDER — ONDANSETRON 2 MG/ML
4 INJECTION INTRAMUSCULAR; INTRAVENOUS EVERY 6 HOURS PRN
Status: DISCONTINUED | OUTPATIENT
Start: 2018-02-13 | End: 2018-02-16 | Stop reason: HOSPADM

## 2018-02-13 RX ORDER — ONDANSETRON 2 MG/ML
INJECTION INTRAMUSCULAR; INTRAVENOUS PRN
Status: DISCONTINUED | OUTPATIENT
Start: 2018-02-13 | End: 2018-02-13

## 2018-02-13 RX ORDER — SODIUM CHLORIDE, SODIUM LACTATE, POTASSIUM CHLORIDE, CALCIUM CHLORIDE 600; 310; 30; 20 MG/100ML; MG/100ML; MG/100ML; MG/100ML
INJECTION, SOLUTION INTRAVENOUS CONTINUOUS
Status: DISCONTINUED | OUTPATIENT
Start: 2018-02-13 | End: 2018-02-13 | Stop reason: HOSPADM

## 2018-02-13 RX ORDER — ONDANSETRON 4 MG/1
4 TABLET, ORALLY DISINTEGRATING ORAL EVERY 30 MIN PRN
Status: DISCONTINUED | OUTPATIENT
Start: 2018-02-13 | End: 2018-02-13 | Stop reason: HOSPADM

## 2018-02-13 RX ORDER — LIDOCAINE HYDROCHLORIDE 20 MG/ML
INJECTION, SOLUTION INFILTRATION; PERINEURAL PRN
Status: DISCONTINUED | OUTPATIENT
Start: 2018-02-13 | End: 2018-02-13

## 2018-02-13 RX ORDER — NALOXONE HYDROCHLORIDE 0.4 MG/ML
.1-.4 INJECTION, SOLUTION INTRAMUSCULAR; INTRAVENOUS; SUBCUTANEOUS
Status: DISCONTINUED | OUTPATIENT
Start: 2018-02-13 | End: 2018-02-16 | Stop reason: HOSPADM

## 2018-02-13 RX ORDER — SODIUM CHLORIDE 9 MG/ML
INJECTION, SOLUTION INTRAVENOUS CONTINUOUS
Status: DISCONTINUED | OUTPATIENT
Start: 2018-02-13 | End: 2018-02-14 | Stop reason: CLARIF

## 2018-02-13 RX ORDER — NALOXONE HYDROCHLORIDE 0.4 MG/ML
.1-.4 INJECTION, SOLUTION INTRAMUSCULAR; INTRAVENOUS; SUBCUTANEOUS
Status: ACTIVE | OUTPATIENT
Start: 2018-02-13 | End: 2018-02-14

## 2018-02-13 RX ORDER — FENTANYL CITRATE 50 UG/ML
25-50 INJECTION, SOLUTION INTRAMUSCULAR; INTRAVENOUS
Status: DISCONTINUED | OUTPATIENT
Start: 2018-02-13 | End: 2018-02-13 | Stop reason: HOSPADM

## 2018-02-13 RX ORDER — HYDROCODONE BITARTRATE AND ACETAMINOPHEN 5; 325 MG/1; MG/1
1-2 TABLET ORAL EVERY 4 HOURS PRN
Status: DISCONTINUED | OUTPATIENT
Start: 2018-02-13 | End: 2018-02-14

## 2018-02-13 RX ORDER — HEPARIN SODIUM 1000 [USP'U]/ML
INJECTION, SOLUTION INTRAVENOUS; SUBCUTANEOUS PRN
Status: DISCONTINUED | OUTPATIENT
Start: 2018-02-13 | End: 2018-02-13

## 2018-02-13 RX ORDER — SODIUM CHLORIDE 9 MG/ML
INJECTION, SOLUTION INTRAVENOUS CONTINUOUS PRN
Status: DISCONTINUED | OUTPATIENT
Start: 2018-02-13 | End: 2018-02-13

## 2018-02-13 RX ORDER — IOPAMIDOL 755 MG/ML
INJECTION, SOLUTION INTRAVASCULAR PRN
Status: DISCONTINUED | OUTPATIENT
Start: 2018-02-13 | End: 2018-02-13

## 2018-02-13 RX ORDER — LIDOCAINE HYDROCHLORIDE AND EPINEPHRINE 10; 10 MG/ML; UG/ML
INJECTION, SOLUTION INFILTRATION; PERINEURAL PRN
Status: DISCONTINUED | OUTPATIENT
Start: 2018-02-13 | End: 2018-02-13 | Stop reason: HOSPADM

## 2018-02-13 RX ORDER — EPHEDRINE SULFATE 50 MG/ML
INJECTION, SOLUTION INTRAMUSCULAR; INTRAVENOUS; SUBCUTANEOUS PRN
Status: DISCONTINUED | OUTPATIENT
Start: 2018-02-13 | End: 2018-02-13

## 2018-02-13 RX ORDER — HYDRALAZINE HYDROCHLORIDE 20 MG/ML
INJECTION INTRAMUSCULAR; INTRAVENOUS PRN
Status: DISCONTINUED | OUTPATIENT
Start: 2018-02-13 | End: 2018-02-13

## 2018-02-13 RX ORDER — ONDANSETRON 2 MG/ML
4 INJECTION INTRAMUSCULAR; INTRAVENOUS EVERY 30 MIN PRN
Status: DISCONTINUED | OUTPATIENT
Start: 2018-02-13 | End: 2018-02-13 | Stop reason: HOSPADM

## 2018-02-13 RX ADMIN — Medication 10 MG: at 10:39

## 2018-02-13 RX ADMIN — SODIUM CHLORIDE: 9 INJECTION, SOLUTION INTRAVENOUS at 10:00

## 2018-02-13 RX ADMIN — Medication 600 MG: at 06:20

## 2018-02-13 RX ADMIN — FLUOXETINE HYDROCHLORIDE 20 MG: 10 CAPSULE ORAL at 22:23

## 2018-02-13 RX ADMIN — ONDANSETRON 4 MG: 2 INJECTION INTRAMUSCULAR; INTRAVENOUS at 12:21

## 2018-02-13 RX ADMIN — AMLODIPINE BESYLATE 10 MG: 10 TABLET ORAL at 07:46

## 2018-02-13 RX ADMIN — HEPARIN SODIUM 1000 UNITS: 1000 INJECTION, SOLUTION INTRAVENOUS; SUBCUTANEOUS at 11:57

## 2018-02-13 RX ADMIN — MIDAZOLAM 2 MG: 1 INJECTION INTRAMUSCULAR; INTRAVENOUS at 10:05

## 2018-02-13 RX ADMIN — HEPARIN SODIUM 5000 UNITS: 5000 INJECTION, SOLUTION INTRAVENOUS; SUBCUTANEOUS at 06:20

## 2018-02-13 RX ADMIN — HEPARIN SODIUM 5000 UNITS: 1000 INJECTION, SOLUTION INTRAVENOUS; SUBCUTANEOUS at 11:44

## 2018-02-13 RX ADMIN — CEFAZOLIN SODIUM 2 G: 2 INJECTION, SOLUTION INTRAVENOUS at 11:15

## 2018-02-13 RX ADMIN — LIDOCAINE HYDROCHLORIDE 40 MG: 20 INJECTION, SOLUTION INFILTRATION; PERINEURAL at 10:09

## 2018-02-13 RX ADMIN — SODIUM CHLORIDE: 9 INJECTION, SOLUTION INTRAVENOUS at 07:46

## 2018-02-13 RX ADMIN — PROPOFOL 100 MCG/KG/MIN: 10 INJECTION, EMULSION INTRAVENOUS at 10:10

## 2018-02-13 RX ADMIN — SENNOSIDES AND DOCUSATE SODIUM 1 TABLET: 8.6; 5 TABLET ORAL at 22:22

## 2018-02-13 RX ADMIN — HYDRALAZINE HYDROCHLORIDE 4 MG: 20 INJECTION INTRAMUSCULAR; INTRAVENOUS at 12:35

## 2018-02-13 RX ADMIN — HEPARIN SODIUM 5000 UNITS: 5000 INJECTION, SOLUTION INTRAVENOUS; SUBCUTANEOUS at 22:22

## 2018-02-13 RX ADMIN — HYDRALAZINE HYDROCHLORIDE 4 MG: 20 INJECTION INTRAMUSCULAR; INTRAVENOUS at 12:29

## 2018-02-13 RX ADMIN — FENTANYL CITRATE 50 MCG: 50 INJECTION, SOLUTION INTRAMUSCULAR; INTRAVENOUS at 10:09

## 2018-02-13 RX ADMIN — Medication 600 MG: at 22:21

## 2018-02-13 RX ADMIN — SODIUM CHLORIDE 75 ML/HR: 9 INJECTION, SOLUTION INTRAVENOUS at 13:30

## 2018-02-13 ASSESSMENT — ACTIVITIES OF DAILY LIVING (ADL)
ADLS_ACUITY_SCORE: 11

## 2018-02-13 NOTE — ANESTHESIA PREPROCEDURE EVALUATION
Anesthesia Evaluation     . Pt has not had prior anesthetic            ROS/MED HX    ENT/Pulmonary:       Neurologic:       Cardiovascular:         METS/Exercise Tolerance:  >4 METS   Hematologic:         Musculoskeletal:         GI/Hepatic:         Renal/Genitourinary:         Endo:     (+) type II DM Not using insulin .      Psychiatric:         Infectious Disease:         Malignancy:         Other:    (+) No chance of pregnancy C-spine cleared: N/A, no H/O Chronic Pain,no other significant disability                    Physical Exam      Airway   Mallampati: II  TM distance: >3 FB  Neck ROM: full    Dental   (+) missing    Cardiovascular   Rhythm and rate: regular and normal      Pulmonary    breath sounds clear to auscultation    Other findings: Left central lower and left upper central incisors missing                Anesthesia Plan      History & Physical Review  History and physical reviewed and following examination; no interval change.    ASA Status:  3 .    NPO Status:  > 8 hours    Plan for MAC with Intravenous induction. Reason for MAC:  Deep or markedly invasive procedure (G8)  PONV prophylaxis:  Ondansetron (or other 5HT-3)  Additional equipment: 2nd IV and Arterial Line All communication with patient through       Postoperative Care      Consents  Anesthetic plan, risks, benefits and alternatives discussed with:  Patient.  Use of blood products discussed: Yes.   Use of blood products discussed with Patient.  Consented to blood products.  .                          ANESTHESIA PREOP EVALUATION    Procedure: ENDOVASCULAR REPAIR ANEURYSM ABDOMINAL AORTA    HPI:     PMHx/PSHx/ROS:  No past medical history on file.    No past surgical history on file.      Past Anes Hx: No personal or family h/o anesthesia problems    Soc Hx:   Social History     Social History     Marital status: Single     Spouse name: N/A     Number of children: N/A     Years of education: N/A     Social History Main Topics  "    Smoking status: Not on file     Smokeless tobacco: Not on file     Alcohol use Not on file     Drug use: Not on file     Sexual activity: Not on file     Other Topics Concern     Not on file     Social History Narrative       Allergies: No Known Allergies    Meds:   Prescriptions Prior to Admission   Medication Sig Dispense Refill Last Dose     FLUOXETINE HCL PO Take 20 mg by mouth daily        LISINOPRIL PO Take 20 mg by mouth 2 times daily        HYDROCHLOROTHIAZIDE PO Take 25 mg by mouth daily        metFORMIN (GLUCOPHAGE-XR) 500 MG 24 hr tablet Take 2,000 mg by mouth daily (with dinner)        LABETALOL HCL PO Take 600 mg by mouth 3 times daily    at Never started at home     AMLODIPINE BESYLATE PO Take 10 mg by mouth daily    at Never started at home       No current outpatient prescriptions on file.       Physical Exam:  VS: T 98.2, P 61, /74, R 13, SpO2 97%     Weight:   Wt Readings from Last 2 Encounters:   02/11/18 62.1 kg (136 lb 14.5 oz)     Height:   Ht Readings from Last 2 Encounters:   02/03/18 1.448 m (4' 9\")       NPO Status: > 8 hrs    Labs:  UPT:  N/A    BMP:  Lab Results   Component Value Date     02/09/2018      Lab Results   Component Value Date    POTASSIUM 4.3 02/13/2018     Lab Results   Component Value Date    CHLORIDE 106 02/09/2018     Lab Results   Component Value Date    LYNNE 9.5 02/09/2018     Lab Results   Component Value Date    CO2 23 02/09/2018     Lab Results   Component Value Date    BUN 22 02/09/2018     Lab Results   Component Value Date    CR 1.08 02/13/2018     Lab Results   Component Value Date     02/09/2018        CBC:  Lab Results   Component Value Date    WBC 7.9 02/13/2018     Lab Results   Component Value Date    HGB 12.6 02/13/2018     Lab Results   Component Value Date    HCT 39.1 02/13/2018     Lab Results   Component Value Date     02/13/2018        Imaging:    No results found for this or any previous visit (from the past 8760 " hour(s)).    EKG: Normal sinus rhythm      Anesthesia plan was discussed in detail with patient. He understood and agreed to proceed as planned all questions answered  Guy Stiles MD    2/13/2018  9:56 AM

## 2018-02-13 NOTE — H&P
SURGICAL ICU ADMISSION NOTE  2/13/2018    ASSESSMENT: Tiffany Lenz is a 47 year old male with history of HTN and known type B aortic dissection who is admitted to the SICU after thoracic endovascular repair of the aorta distal to the left subclavian artery with stent placement through the right femoral artery on 02/13/18 with Dr. Monique.     PLAN:   Neuro/ pain/ sedation:  -Monitor neurological status. Notify the MD for any acute changes in exam.  - Q1 hr bilateral lower extremity motor and sensory neurochecks for 12 hours    - Plantar and dorsiflexion for first 4 hours    - Hip flexion at hour 5   - Oxycodone 2.5-5 Q4 for pain.     Pulmonary care:   - Supplemental oxygen to keep saturation above 92 %.  - Incentive spirometer every 15- 30 minutes when awake.       Cardiovascular:    - Monitor hemodynamic status    - Maintain MAPs greater than 80 per vascular surgery    - Goal of map 80-90 per vascular surgery   - PTA medications: HCTZ , lisinopril, amlodipine, labetalol listed from d/c from [place]  - PRN labetalol      GI care:   - NPO except ice chips and medications.     Fluids/ Electrolytes/ Nutrition:   - NS at 75ml/hr for IV fluid hydration  - Diet NPO  - No indication for parenteral nutrition.     Renal/ Fluid Balance:    -Will continue to monitor intake and output.  - Daily BMP   - Mag, phos, calcium in AM      Endocrine:    Sliding scale for diabetes management.  - Hold PTA metformin      ID/ Antibiotics:  - No indication for antibiotics.     Heme:     - Hemoglobin stable.   - CBC daily      Prophylaxis:    - Mechanical prophylaxis for DVT.   - No chemical DVT prophylaxis due to high risk of bleeding.      Lines/ tubes/ drains:  - Art line, PIVx2, Robles      Disposition:  -Surgical ICU. Likely transfer to floor tomorrow if stable.     Patient seen, findings and plan discussed with surgical ICU staff, Dr. Velasquez.    -----------------------------------  Pravin Chester DO, MSc  Anesthesia Resident,  PGY2      - - - - - - - - - - - - - - - - - - - - - - - - - - - - - - - - - - - - - - - - - - - - - - - - - - - - - - - - - - - - - - - - - - - - - - - -     PRIMARY TEAM: Vascular Surgery    PRIMARY PHYSICIAN: Dr. Monisha Monique MD    REASON FOR CRITICAL CARE ADMISSION: sp Aortic stent placement   ADMITTING PHYSICIAN: Dr. Agnes Velasquez    HISTORY PRESENTING ILLNESS: Tiffany Lenz is a 47 year old year old male initially transferred to [facility] from [facility] for worsening type B thoracic aortic dissection. Patient was admitted to the SICU from 2/3/18 until 2/8/18 for close blood pressure monitoring initially requiring esmolol and diltiazem drip. After blood pressure stabilization with transition back to patient's PTA the patient was transferred to the floor to await surgery.     On 02/13/18 the patient underwent a thoracic endovascular repair of the aorta distal to the left subclavian artery with stent placement through the right femoral artery. The patient was transferred to the SICU for close blood pressure monitoring and Q1hr lower extremity neuro checks.      REVIEW OF SYSTEMS: As noted above. No headache, dizziness. No fever, chills. No chest pain or shortness of breath. No abdominal pain, nausea, vomiting. No diarrhea or constipation.     PAST MEDICAL HISTORY:   No past medical history on file.    SURGICAL HISTORY:   No past surgical history on file.    SOCIAL HISTORY: Tobacco - Smoking since 1991. Currently unemployed     FAMILY HISTORY: Noncontributory     ALLERGIES:    No Known Allergies    MEDICATIONS:    No current facility-administered medications on file prior to encounter.   No current outpatient prescriptions on file prior to encounter.    PHYSICAL EXAMINATION:  Temp:  [97.6  F (36.4  C)-98.4  F (36.9  C)] 98  F (36.7  C)  Heart Rate:  [54-64] 58  Resp:  [13-18] 14  BP: ()/(64-75) 124/71  MAP:  [91 mmHg-92 mmHg] 91 mmHg  Arterial Line BP: (137-139)/(64-65) 137/65  SpO2:  [97 %-100 %] 97  %  General: Alert, well-appearing, resting comfortably   HEENT: Normocephalic, atraumatic.  Neck: No cervical lymphadenopathy.   Chest wall: Symmetric thorax. No masses or tenderness to palpation.  Respiratory: Non-labored breathing. Lung sounds clear to auscultation bilaterally.   Cardiovascular: Regular rate and rhythm.   Gastrointestinal: Abdomen soft, non-distended, non-tender to palpation.   Genitourinary: No CVA tenderness. Robles in place   Extremities: Motor and sensation intact in bilateral lower extremity, palpable PT pulses bilaterally   Incisions: Right and left femoral access sites with dermabond in place - no bleeding visualized     LABS: Reviewed.   Arterial Blood Gases   No lab results found in last 7 days.  Complete Blood Count     Recent Labs  Lab 02/13/18  0639 02/09/18 0457 02/08/18 2050   WBC 7.9 8.2  --    HGB 12.6* 12.3*  --     251 262     Basic Metabolic Panel    Recent Labs  Lab 02/13/18  0639 02/10/18  0814 02/09/18 0457 02/08/18 2050 02/07/18  0526   NA  --   --  137  --  140   POTASSIUM 4.3 4.3 3.9 3.8 3.8   CHLORIDE  --   --  106  --  108   CO2  --   --  23  --  24   BUN  --   --  22  --  13   CR 1.08  --  0.95  --  0.86   GLC  --   --  162*  --  134*     Liver Function Tests    Recent Labs  Lab 02/13/18  0639   INR 1.04     Pancreatic Enzymes  No lab results found in last 7 days.  Coagulation Profile    Recent Labs  Lab 02/13/18  0639   INR 1.04     Lactate  Invalid input(s): LACTATE    IMAGING:  Results for orders placed or performed during the hospital encounter of 02/03/18   XR Chest Port 1 View    Narrative    EXAM: XR CHEST PORT 1 VW  2/3/2018 9:35 AM      HISTORY: aortic dissection;     COMPARISON: None    FINDINGS: Semiupright portable AP view of the chest obtained. The  rotation of the projection is slightly obliqued. Trachea is midline  widening of the superior mediastinum. Hazy left basilar opacities.  Otherwise the costophrenic angles are clear. No pneumothorax.  Upper  abdomen is unremarkable.       Impression    IMPRESSION:   1. Widening of the superior mediastinum. Given history, differential  includes mediastinal hematoma secondary to aortic injury versus  aneurysm versus widening from dissection.  2. Left basilar and retrocardiac opacities, atelectasis versus  consolidation.    I have personally reviewed the examination and initial interpretation  and I agree with the findings.    [facility]CO MINOR   CT Chest/Abd/Pelvis Angio w Processing    Narrative    Exam: Computed tomographic angiography of the chest, abdomen and  pelvis without and with contrast including 3D reformations dated  2/6/2018 4:01 PM    Clinical information: History of type B thoracic dissection; follow-up  progression    Technique: Axial images through the chest and abdomen obtained before  the administration of intravenous contrast media and following the  injection of contrast media  in the arterial phase. Source images  reviewed as well as 3D and multi-planar reconstructions at a 3D  workstation.    Contrast: iopamidol (ISOVUE-370) solution 100 mL    DLP: 2736 mGy*cm    Comparison: CT 2/3/2018.    Findings:  As seen previously, on the noncontrast enhanced scan there  is a hyperdense crescent intramural hematoma/thrombosed aortic  dissection which begins just distal to the takeoff of the left  subclavian artery, and extends to the distal descending level just  above the diaphragm. The overall size appears unchanged from 2/3/2018.  No extension below the diaphragm. On the post-contrast images, there  is a large penetrating aortic ulcer with the base measuring  approximately 3.0 cm (sagittal image 80). A small inferior component  of the ulcer is less opacified than 2/3/2018, which could be related  to interval clotting or incomplete filling.    Thoracic aortic diameters:      Sinuses of Valsalva: 3.5 x 3.5 x 3.3 cm    Sinotubular ridge: 3.5 cm    Ascending aorta:  4.1 cm    Arch: 2.9 cm    Proximal  descending thoracic aorta: 3.8 cm    Mid descending thoracic aorta: 3.7 cm    Descending thoracic aorta at the diaphragm: 2.4 cm     There is normal branching pattern of the great vessels. Mild calcified  and noncalcified atherosclerotic plaques in the proximal great  vessels. The proximal pulmonary vasculature  appears normal.     The celiac axis, SMA and AYLIN are patent . The renal arteries are  patent bilaterally. Fusiform aneurysm of the distal abdominal aorta  extending into the aortic bifurcation, with a maximum diameter of 4.0  cm; the aneurysm measures approximately 5.2 cm in length, and is  located 4.7 cm inferior to the right renal artery.     Chest and abdomen:     Mildly enlarged heart. No pericardial effusion. Small left-sided  pleural effusion with left lower lobe compressive atelectasis. No  pneumothorax or focal consolidation. Central airways are patent.  Respiratory motion limits evaluation for pulmonary nodule dissection.  Scattered small reactive appearing mediastinal lymph nodes.    Indeterminate 10 mm hypodensity in the right mid renal pole and a 1.3  cm hypodensity in the right lower pole. No hydronephrosis or  nephrolithiasis. The gallbladder, spleen, adrenal glands, pancreas,  small bowel, large bowel, and appendix are normal. No free fluid or  bulky lymphadenopathy.    Bones: No aggressive appearing bony lesion.      Impression    Impression:  1. Stable large penetrating aortic ulcer in the mid descending aorta  with the base measuring 3.0 cm; this results in a [place] type B  intramural hematoma/aortic dissection which extends from the proximal  descending thoracic aorta to the distal descending thoracic aorta. No  subdiaphragmatic extension. There is aneurysmal dilatation of the  ascending and descending aorta with maximum diameters of 4.1 and 3.8  cm, respectively.  2. Fusiform aneurysm of the distal abdominal aorta extending into the  aortic bifurcation, with a maximum diameter of 4.0  cm.  3. New small left-sided pleural effusion.  4. Mild cardiomegaly.  5. Indeterminate 10 mm hypodensity in the right mid renal pole and 1.3  cm hypodensity in the right lower renal pole. Consider renal  ultrasound for further characterization.         CT Chest/Abd/Pelvis Angio w Processing    Narrative    Exam: Computed tomographic angiography of the chest, abdomen and  pelvis without and with contrast including 3D reformations dated  2/12/2018 3:41 PM    Clinical information: type B thoracic aortic dissection, intramural  hematoma    Technique: Axial images through the chest and abdomen obtained before  the administration of intravenous contrast media and following the  injection of contrast media  in the arterial phase. Source images  reviewed as well as 3D and multi-planar reconstructions at a 3D  workstation.    Contrast: 100 cc of isovue 370    DLP: 839 mGy*cm    Comparison: CT 2/6/2018.    Findings:      Stable appearance of the intramural hematoma/thrombosed aortic  dissection beginning just after the takeoff of the left subclavian  artery, extending to the distal descending aorta just above the  diaphragm. Overall size is unchanged to slightly reduced from 2/3/2018  compatible with some interval retraction/healing of the thrombosed  false lumen; for example on axial series 12, image 292, the aorta  measures 3.6 cm, previously 3.8 cm. No infradiaphragmatic extension.  The large penetrating ulcer in the mid descending aorta is unchanged,  with base measuring approximately 3.0 cm and the ulcer having a  maximum length of 4.8 cm. There is also a tiny unchanged penetrating  ulcer in the aortic arch with the base measuring 5 mm (axial series  12, image 181).    There is normal branching pattern of the great vessels. Calcified and  noncalcified atherosclerotic plaques in the proximal great vessels.  The proximal pulmonary vasculature appears normal.     The celiac axis, SMA and AYLIN are patent. The renal arteries  are patent  bilaterally.    Fusiform aneurysmal dilatation of the infrarenal aorta with maximum  diameter 4.0 cm; the aneurysm measures approximately 5.0 cm in length  and extends to the aortic bifurcation. No aneurysmal dilatation of the  iliac arteries.    Chest, abdomen, and pelvis:     No hilar, mediastinal or axillary lymphadenopathy is seen. Heart is  enlarged. No pericardial or pleural effusion. Left basilar  subsegmental atelectasis.    The spleen, liver, adrenal glands, pancreas, small bowel, and large  bowel are normal. Unchanged indeterminate 10 mm hypodensity in the  right renal midpole and 12 mm hypodensity in the left renal lower  pole. No free fluid or bulky lymphadenopathy.    No suspicious bony abnormality.       Impression    Impression:  1. Stable [place] type B intramural hematoma/thrombosed aortic  dissection. There does appear to be very mild interval decrease in  diameter of the thrombosed false lumen suggesting some interval  retraction or healing. Additionally, the large penetrating ulcer in  the mid descending thoracic aorta and the tiny aortic arch penetrating  ulcer are unchanged.  2. Stable fusiform aneurysm of the distal abdominal aorta with a  maximum diameter of 4.0 cm.        I have personally reviewed the examination and initial interpretation  and I agree with the findings.    LUIS E MENDEZ MD   IR OR Angiogram    Narrative    This exam was marked as non-reportable because it will not be read by a   radiologist or a [place] non-radiologist provider.

## 2018-02-13 NOTE — PLAN OF CARE
Problem: Patient Care Overview  Goal: Plan of Care/Patient Progress Review  Outcome: No Change  D/I/A:   Neuro: A&O, up independently in room.  utilized. Pt denies pain, refused morning meds due to nausea and stated he needs to take them with food but is NPO.   CV: HR 50s sinus hugo, BP 110s/60s.   Pulm: RA O2 sats 100%. Lungs clear.  GI: NPO for procedure, was on regular diet. Refusing bowel meds.  : Voids without issue.  P: Pt went down to Pre-op for OR at 0900, will transfer to 4A SICU after PACU.

## 2018-02-13 NOTE — PROGRESS NOTES
"Patient arrived from PACU AO x 4 and following commands. Primarily Hmong speaking but able to understand some English. PACU RN citing patient to remain supine w/ lower extremities extended till 1700. Patient -140's w/ MAP 90's SICU resident notified plan continue to monitor. Md citing MAP goal 80-90 but \"MAP 90's is okay.\" Also, plan to hold scheduled PO antihypertensive meds till next AM.  "

## 2018-02-13 NOTE — ANESTHESIA POSTPROCEDURE EVALUATION
Patient: Tiffany Her    Procedure(s):  thoracic endovascular repair of aorta distal to left subclavian artery.  Intravascular ultrasouns of aortic arch, thoracic aorta, and abdominal aorta.  ultrasound guided vascular accessx2, bilateral lower extremity arterial duplex limited study, percutaneous closure of endovascular access x1.  aorta catheterization x1. - Wound Class: I-Clean    Diagnosis:Aortic Dissection   Diagnosis Additional Information: No value filed.    Anesthesia Type:  MAC    Note:  Anesthesia Post Evaluation    Patient location during evaluation: PACU  Patient participation: Able to fully participate in evaluation  Level of consciousness: awake and alert  Pain management: adequate  Airway patency: patent  Cardiovascular status: hemodynamically stable  Respiratory status: acceptable and spontaneous ventilation  Hydration status: acceptable  PONV: none     Anesthetic complications: None          Last vitals:  Vitals:    02/13/18 1330 02/13/18 1345 02/13/18 1400   BP: 120/73 126/69 121/72   Pulse:      Resp:   16   Temp:      SpO2: 98% 97% 97%         Electronically Signed By: Guy Stiles MD  February 13, 2018  2:13 PM

## 2018-02-13 NOTE — ANESTHESIA CARE TRANSFER NOTE
Patient: Tiffany Her    Procedure(s):  thoracic endovascular repair of aorta distal to left subclavian artery.  Intravascular ultrasouns of aortic arch, thoracic aorta, and abdominal aorta.  ultrasound guided vascular accessx2, bilateral lower extremity arterial duplex limited study, percutaneous closure of endovascular access x1.  aorta catheterization x1. - Wound Class: I-Clean    Diagnosis: Aortic Dissection   Diagnosis Additional Information: No value filed.    Anesthesia Type:   MAC     Note:  Airway :Room Air  Patient transferred to:PACU  Comments: Pt stable upon transfer of careHandoff Report: Identifed the Patient, Identified the Reponsible Provider, Reviewed the pertinent medical history, Discussed the surgical course, Reviewed Intra-OP anesthesia mangement and issues during anesthesia, Set expectations for post-procedure period and Allowed opportunity for questions and acknowledgement of understanding      Vitals: (Last set prior to Anesthesia Care Transfer)    CRNA VITALS  2/13/2018 1206 - 2/13/2018 1244      2/13/2018             Pulse: 60    ART BP: 133/60    ART Mean: 90    SpO2: 99 %    Resp Rate (set): 10                Electronically Signed By: BYRON Davis CRNA  February 13, 2018  12:44 PM

## 2018-02-13 NOTE — PLAN OF CARE
"Problem: Patient Care Overview  Goal: Plan of Care/Patient Progress Review  Outcome: No Change  /71 (BP Location: Right arm)  Pulse 61  Temp 98.4  F (36.9  C) (Oral)  Resp 16  Ht 1.448 m (4' 9\")  Wt 62.1 kg (136 lb 14.5 oz)  SpO2 99%  BMI 29.63 kg/m2  Neuro: A&Ox4.   Cardiac: SB/SR, 50-60s. VSS. SBP maintained <120.      Respiratory: Sating >95% on RA.  GI/: Adequate urine output. No BM.   Diet/appetite: NPO.  Activity:  Assist stand by, up to bathroom.  Pain: Denies.    Skin: Intact, no new deficits noted. L arm bruise noted.     R: Continue with POC. Notify primary team with changes. Plan for TEVAR procedure this afternoon.          "

## 2018-02-13 NOTE — ANESTHESIA PROCEDURE NOTES
Arterial Line Procedure Note    Location: In OR Before Induction  Procedure Start/Stop Times:     patient identified, IV checked, site marked, risks and benefits discussed, informed consent, monitors and equipment checked, pre-op evaluation and at physician/surgeon's request      Correct Patient: Yes      Correct Position: Yes      Correct Site: Yes      Correct Procedure: Yes      Correct Laterality:  Yes    Site Marked:  Yes  Line Placement:     Procedure:  Arterial Line    Insertion laterality:  Right    Skin Prep: Chloraprep      Patient Prep: patient draped, mask, sterile gloves, hat and hand hygiene      Local skin infiltration:  2% lidocaine    amount (mL):  2    Ultrasound Guided?: Yes      Artery evaluated via ultrasound confirming patency.   Using realtime imaging, the artery was punctured and the needle was observed entering the artery.      A permanent image is NOT entered into the patient's record.      Catheter size:  20 gauge, 12 cm    Cath secured with: suture      Dressing:  Tegaderm    Complications:  None obvious    Arterial waveform: Yes      IBP within 10% of NIBP: Yes

## 2018-02-13 NOTE — PLAN OF CARE
Problem: Patient Care Overview  Goal: Plan of Care/Patient Progress Review  Outcome: No Change  Neuro: A&Ox4. Hmong speaking.   Cardiac: SBrady, HR 50s. VSS, BPs remained within desired range, SBP <120, HR <70.   Respiratory: Sating 97-98% on RA.  GI/: Adequate urine output. No BMs this shift.   Diet/appetite: Tolerating diet. Eating well.   Activity:  Up independently.   Pain: Denies.  Skin: Intact, no new deficits noted. Bruising on left upper arm.   LDA's: R PIV SL    Plan: NPO at midnight for scheduled TEVAR tomorrow at 1550. Continue with POC. Notify primary team with changes.      Problem: Aortic Aneurysm/Dissection Repair (Adult)  Intervention: Maintain Blood Pressure Within Desired Range  BPs maintained within desired range, SBP <120, HR <70. No PRN BP medications needed. Scheduled Lisinopril and Labetolol given.       Problem: Diabetes Comorbidity  Goal: Diabetes  Patient comorbidity will be monitored for signs and symptoms of hyperglycemia or hypoglycemia. Problems will be absent, minimized or managed by discharge/transition of care.   Outcome: No Change  Diabetes controlled. BGs 119 and 159 at HS. No SSI needed this shift.

## 2018-02-13 NOTE — BRIEF OP NOTE
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  Intravascular ultrasouns of aortic arch, thoracic aorta, and abdominal aorta.  ultrasound guided vascular accessx2, bilateral lower extremity arterial duplex limited study, percutaneous closure of endovascular access x1.  aorta catheterization x1. - Wound Class: I-Clean  Surgeon: Surgeon(s) and Role:     * Monisha Monique MD - Primary     * Lejeune, Stacey, MD - Resident - Assisting  Anesthesia: Combined MAC with Local   Estimated blood loss: Less than 50 ml  Drains: None  Specimens: * No specimens in log *  Findings:   30mm x 150mm Medtronic graft placement in Aortic zones 4-5.  Completion run without endoleak. Palp DP bilaterally post procedure..  Complications: None.  Implants: As above Medtronic graft in descending Throacic aorta.

## 2018-02-13 NOTE — PROGRESS NOTES
"VASCULAR SURGERY PROGRESS NOTE    Subjective:  Pt found sitting in chair at bedside. Bedside RN reports no events overnight and pt received sub Q Heparin as ordered at 6am. Pt denies chest pain. Pt has no questions. Pt knows not to eat or drink today aside from meds with a sip of water.     Objective:  Intake/Output Summary (Last 24 hours) at 02/13/18 0737  Last data filed at 02/12/18 2000   Gross per 24 hour   Intake             1030 ml   Output                0 ml   Net             1030 ml     Labs:  ROUTINE IP LABS (Last four results)  BMP  Recent Labs  Lab 02/10/18  0814 02/09/18  0457 02/08/18  2050 02/07/18  0526   NA  --  137  --  140   POTASSIUM 4.3 3.9 3.8 3.8   CHLORIDE  --  106  --  108   LYNNE  --  9.5  --  9.4   CO2  --  23  --  24   BUN  --  22  --  13   CR  --  0.95  --  0.86   GLC  --  162*  --  134*     CBC  Recent Labs  Lab 02/09/18 0457 02/08/18 2050   WBC 8.2  --    RBC 3.88*  --    HGB 12.3*  --    HCT 37.3*  --    MCV 96  --    MCH 31.7  --    MCHC 33.0  --    RDW 12.8  --     262     INRNo lab results found in last 7 days.  PHYSICAL EXAM:  /75  Pulse 61  Temp 98.4  F (36.9  C) (Oral)  Resp 16  Ht 1.448 m (4' 9\")  Wt 62.1 kg (136 lb 14.5 oz)  SpO2 99%  BMI 29.63 kg/m2  General: The patient is alert and oriented. Appropriate. No acute disctress  Psych: pleasant affect, answers questions appropriately  Skin: Color appropriate for race, warm, dry.  Respiratory: The patient does not require supplemental oxygen. Breathing unlabored    Imaging:   Exam: Computed tomographic angiography of the chest, abdomen and  pelvis without and with contrast including 3D reformations dated  2/12/2018 3:41 PM     Clinical information: type B thoracic aortic dissection, intramural  hematoma     Technique: Axial images through the chest and abdomen obtained before  the administration of intravenous contrast media and following the  injection of contrast media  in the arterial phase. Source " images  reviewed as well as 3D and multi-planar reconstructions at a 3D  workstation.     Contrast: 100 cc of isovue 370     DLP: 839 mGy*cm     Comparison: CT 2/6/2018.     Findings:       Stable appearance of the intramural hematoma/thrombosed aortic  dissection beginning just after the takeoff of the left subclavian  artery, extending to the distal descending aorta just above the  diaphragm. Overall size is unchanged to slightly reduced from 2/3/2018  compatible with some interval retraction/healing of the thrombosed  false lumen; for example on axial series 12, image 292, the aorta  measures 3.6 cm, previously 3.8 cm. No infradiaphragmatic extension.  The large penetrating ulcer in the mid descending aorta is unchanged,  with base measuring approximately 3.0 cm and the ulcer having a  maximum length of 4.8 cm. There is also a tiny unchanged penetrating  ulcer in the aortic arch with the base measuring 5 mm (axial series  12, image 181).     There is normal branching pattern of the great vessels. Calcified and  noncalcified atherosclerotic plaques in the proximal great vessels.  The proximal pulmonary vasculature appears normal.      The celiac axis, SMA and AYLIN are patent. The renal arteries are patent  bilaterally.     Fusiform aneurysmal dilatation of the infrarenal aorta with maximum  diameter 4.0 cm; the aneurysm measures approximately 5.0 cm in length  and extends to the aortic bifurcation. No aneurysmal dilatation of the  iliac arteries.     Chest, abdomen, and pelvis:      No hilar, mediastinal or axillary lymphadenopathy is seen. Heart is  enlarged. No pericardial or pleural effusion. Left basilar  subsegmental atelectasis.     The spleen, liver, adrenal glands, pancreas, small bowel, and large  bowel are normal. Unchanged indeterminate 10 mm hypodensity in the  right renal midpole and 12 mm hypodensity in the left renal lower  pole. No free fluid or bulky lymphadenopathy.     No suspicious bony  abnormality.      Impression:  1. Stable [place] type B intramural hematoma/thrombosed aortic  dissection. There does appear to be very mild interval decrease in  diameter of the thrombosed false lumen suggesting some interval  retraction or healing. Additionally, the large penetrating ulcer in  the mid descending thoracic aorta and the tiny aortic arch penetrating  ulcer are unchanged.  2. Stable fusiform aneurysm of the distal abdominal aorta with a  maximum diameter of 4.0 cm.    I have personally reviewed the examination and initial interpretation  and I agree with the findings.     LUIS E MENDEZ MD    ASSESSMENT:  47 year old male with type B thoracic aortic dissection, intramural hematoma.  Currently receiving Amlodipine 10 mg daily, Lisinopril 10 mg BID, Labetalol 600 mg every 8 hours for goal of systolic BP < 120.  CTA scan done 2/6/18 demonstrated stable Hi type B dissection with large penetrating aortic ulcer in the mid-segment.       PLAN:  - continue current oral antihypertension medication regimen , patient has not required labetalol since 2/9/18   - continue target BP less than 120 systolic and heart rate less than 60  - repeat CT scan yesterday shows some interval improvement in dissection  - plans for OR this afternoon for thoracic stent graft, scheduled  to be in OR case to assist with translation   - continue heparin SQ for DVT prophylaxis, do not stop for OR   - consent obtained for OR yesterday PM  - pre-op orders placed  - NPO since midnight except zeinab Resendez PA-C   Division of Vascular Surgery   [facility]

## 2018-02-13 NOTE — OP NOTE
Procedure Date: 02/13/2018      LOCATION:  [facility] hybrid operating room.      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      PROCEDURE:  Ultrasound-guided vascular access x 2, bilateral lower extremity arterial duplex limited study, endovascular repair of thoracic aortic penetrating ulcer and aneurysm, distal to left subclavian artery and aortic catheterization x 1, intravascular ultrasound of aortic arch, descending thoracic aorta and abdominal aorta and then percutaneous access closure for delivery of endoprosthesis.      SURGEON:  Monisha Monique MD.      FIRST ASSISTANT:  Stacey Lejeune, MD.      ANESTHESIA:  Local anesthesia with IV sedation by anesthesia team.      FINDINGS:  On ultrasound evaluation of the arteries, triphasic waveforms in both common femoral arteries and superficial femoral arteries bilaterally with healthy-appearing vessels for percutaneous access.  On needle access gained to the 12 o'clock position of the common femoral artery on each side under direct vision with ultrasound images sent to the PACS system.  On the left side, a 6-Australian sheath used intravascular ultrasound findings from the right side, showed patent lower abdominal aorta with some degree of calcification but no aneurysmal degeneration or dissection or critical stenosis.  The lower left renal artery as well as the right renal artery crossing left renal vein and SMA were easily identified as was the celiac artery.  The thoracic aorta, the penetrating ulcer and aneurysmal degeneration was identified and this was found to be a segment of approximately 3 cm in length for the mouth of this aneurysm.  Above this, there was intramural hematoma along the wall of the artery all the way up to the level of the subclavian artery.  The transverse arch and ascending aorta appeared completely healthy  as was the origins of the innominate, left carotid and left subclavian arteries.  We took measurements of the native aorta and felt that on average the remaining below 27 mm in diameter where the artery was healthy and would likely well accommodate a 30 mm diameter graft.  After removal of the delivery system and percutaneous access closure, completion duplex revealed maintained triphasic waveforms in both common femoral arteries and superficial femoral arteries bilaterally, good positioning of this ProGlide sutures in the anterior wall of the artery, no full luminal compromise or thrombus and no evidence of pseudoaneurysm or distal embolization.  The patient received a total of 8.3 minutes of fluoro time with a dose of 80 milligray and a DAP of 10633 milligray per cm2.  Contrast volume was 20 mL.  The patient received 6000 units of heparin IV for the procedure.      BRIEF CLINICAL HISTORY:  Mr. Tiffany Lenz is a 47-year-old gentleman with chest pain and progressive development of a penetrating ulcer starting in January that has grown significantly over time.  We kept him in the hospital on IV blood pressure medicines and converted to oral agents and was able to stabilize his pain and the ulcer progression over the last week, but felt that the sheer size of the ulcer made risk of rupture very significant and that treatment with a stent graft would be appropriate.  The patient strongly supported this with major concerns about his ability to maintain tight blood pressure control.  Plan therefore for procedure.      PROCEDURE NOTE:  The patient was prepped and draped for access to his chest, abdomen, both groins and the thighs on both sides.  An ultrasound was performed as noted above.  Needle access was gained, as noted above on each side with a single stick and Seldinger technique was used to place 2 ProGlides on the right side and a single ProGlide on the left.  On the right side, a 7-Lao sheath was exchanged and  prior to that an 11-Afghan sheath, after that on the left side, a 6-Afghan sheath was introduced.  From the left side, a pigtail catheter was advanced to the aortic arch level under fluoroscopy.  From the right side, an IVUS catheter was advanced with aid of a Glidewire all the way up and around the aortic arch and recordings and images were noted as above.  We used the IVUS catheter to deliver a Lunderquist wire and to carefully measure the length of segment requiring treatment.  An angiogram was performed from the pigtail catheter to further confirm this expectation.  We now removed the IVUS catheter and the 11-Afghan sheath and exchanged in the endograft.  Endograft used was a Valiant [id number] thoracic endograft.  This was positioned, centered on the penetrating ulcer and deployed without difficulty, taking care to stay just below the point of curvature in the aortic arch and well above the celiac artery.  A completion angiogram was performed immediately after deployment of the graft showing no endoleak or filling of the penetrating ulcer.  We elected not to balloon the endograft and retrieved the delivery system and use ProGlide devices to close the access site on the artery without difficulty as noted above.  A 6-Afghan sheath was exchanged out for a single ProGlide as well with successful closure.  Marcaine had been infused in both groin wounds and at the completion a single stitch was required and Dermabond over top on the right side, only Dermabond on the left.  Procedure was well tolerated.         HAYDEE DEMPSEY MD             D: 2018   T: 2018   MT: ADAM      Name:     ARMANI CHRISTY   MRN:      [id number]        Account:        [id number]   :      1970           Procedure Date: 2018      Document: [id number]       cc: STACEY LEJEUNE MD

## 2018-02-14 ENCOUNTER — APPOINTMENT (OUTPATIENT)
Dept: PHYSICAL THERAPY | Facility: CLINIC | Age: 48
End: 2018-02-14
Attending: SURGERY
Payer: COMMERCIAL

## 2018-02-14 ENCOUNTER — OFFICE VISIT (OUTPATIENT)
Dept: INTERPRETER SERVICES | Facility: CLINIC | Age: 48
End: 2018-02-14
Payer: COMMERCIAL

## 2018-02-14 LAB
ANION GAP SERPL CALCULATED.3IONS-SCNC: 8 MMOL/L (ref 3–14)
BUN SERPL-MCNC: 17 MG/DL (ref 7–30)
CALCIUM SERPL-MCNC: 8.4 MG/DL (ref 8.5–10.1)
CHLORIDE SERPL-SCNC: 114 MMOL/L (ref 94–109)
CO2 SERPL-SCNC: 21 MMOL/L (ref 20–32)
CREAT SERPL-MCNC: 0.76 MG/DL (ref 0.66–1.25)
ERYTHROCYTE [DISTWIDTH] IN BLOOD BY AUTOMATED COUNT: 13.1 % (ref 10–15)
GFR SERPL CREATININE-BSD FRML MDRD: >90 ML/MIN/1.7M2
GLUCOSE BLDC GLUCOMTR-MCNC: 125 MG/DL (ref 70–99)
GLUCOSE BLDC GLUCOMTR-MCNC: 125 MG/DL (ref 70–99)
GLUCOSE BLDC GLUCOMTR-MCNC: 215 MG/DL (ref 70–99)
GLUCOSE BLDC GLUCOMTR-MCNC: 249 MG/DL (ref 70–99)
GLUCOSE SERPL-MCNC: 124 MG/DL (ref 70–99)
HCT VFR BLD AUTO: 35.3 % (ref 40–53)
HGB BLD-MCNC: 11.4 G/DL (ref 13.3–17.7)
MAGNESIUM SERPL-MCNC: 2 MG/DL (ref 1.6–2.3)
MCH RBC QN AUTO: 30.9 PG (ref 26.5–33)
MCHC RBC AUTO-ENTMCNC: 32.3 G/DL (ref 31.5–36.5)
MCV RBC AUTO: 96 FL (ref 78–100)
PHOSPHATE SERPL-MCNC: 3.1 MG/DL (ref 2.5–4.5)
PLATELET # BLD AUTO: 187 10E9/L (ref 150–450)
POTASSIUM SERPL-SCNC: 4.3 MMOL/L (ref 3.4–5.3)
RBC # BLD AUTO: 3.69 10E12/L (ref 4.4–5.9)
SODIUM SERPL-SCNC: 143 MMOL/L (ref 133–144)
WBC # BLD AUTO: 8.6 10E9/L (ref 4–11)

## 2018-02-14 PROCEDURE — 25000128 H RX IP 250 OP 636

## 2018-02-14 PROCEDURE — 25000128 H RX IP 250 OP 636: Performed by: STUDENT IN AN ORGANIZED HEALTH CARE EDUCATION/TRAINING PROGRAM

## 2018-02-14 PROCEDURE — 85027 COMPLETE CBC AUTOMATED: CPT | Performed by: STUDENT IN AN ORGANIZED HEALTH CARE EDUCATION/TRAINING PROGRAM

## 2018-02-14 PROCEDURE — 20600001 ZZH R&B ICU INTERMEDIATE UMMC

## 2018-02-14 PROCEDURE — T1013 SIGN LANG/ORAL INTERPRETER: HCPCS | Mod: U3

## 2018-02-14 PROCEDURE — 99231 SBSQ HOSP IP/OBS SF/LOW 25: CPT | Mod: GC | Performed by: SURGERY

## 2018-02-14 PROCEDURE — 97116 GAIT TRAINING THERAPY: CPT | Mod: GP

## 2018-02-14 PROCEDURE — 25000125 ZZHC RX 250: Performed by: STUDENT IN AN ORGANIZED HEALTH CARE EDUCATION/TRAINING PROGRAM

## 2018-02-14 PROCEDURE — 25000128 H RX IP 250 OP 636: Performed by: SURGERY

## 2018-02-14 PROCEDURE — 25000132 ZZH RX MED GY IP 250 OP 250 PS 637: Performed by: PHYSICIAN ASSISTANT

## 2018-02-14 PROCEDURE — 84100 ASSAY OF PHOSPHORUS: CPT | Performed by: STUDENT IN AN ORGANIZED HEALTH CARE EDUCATION/TRAINING PROGRAM

## 2018-02-14 PROCEDURE — 25000132 ZZH RX MED GY IP 250 OP 250 PS 637: Performed by: SURGERY

## 2018-02-14 PROCEDURE — 25000132 ZZH RX MED GY IP 250 OP 250 PS 637: Performed by: STUDENT IN AN ORGANIZED HEALTH CARE EDUCATION/TRAINING PROGRAM

## 2018-02-14 PROCEDURE — 25000132 ZZH RX MED GY IP 250 OP 250 PS 637: Performed by: NURSE PRACTITIONER

## 2018-02-14 PROCEDURE — 40000193 ZZH STATISTIC PT WARD VISIT

## 2018-02-14 PROCEDURE — 83735 ASSAY OF MAGNESIUM: CPT | Performed by: STUDENT IN AN ORGANIZED HEALTH CARE EDUCATION/TRAINING PROGRAM

## 2018-02-14 PROCEDURE — 00000146 ZZHCL STATISTIC GLUCOSE BY METER IP

## 2018-02-14 PROCEDURE — 80048 BASIC METABOLIC PNL TOTAL CA: CPT | Performed by: STUDENT IN AN ORGANIZED HEALTH CARE EDUCATION/TRAINING PROGRAM

## 2018-02-14 PROCEDURE — 97530 THERAPEUTIC ACTIVITIES: CPT | Mod: GP

## 2018-02-14 RX ORDER — HYDRALAZINE HYDROCHLORIDE 20 MG/ML
INJECTION INTRAMUSCULAR; INTRAVENOUS
Status: COMPLETED
Start: 2018-02-14 | End: 2018-02-14

## 2018-02-14 RX ORDER — HYDRALAZINE HYDROCHLORIDE 20 MG/ML
10-20 INJECTION INTRAMUSCULAR; INTRAVENOUS EVERY 4 HOURS PRN
Status: DISCONTINUED | OUTPATIENT
Start: 2018-02-14 | End: 2018-02-16 | Stop reason: HOSPADM

## 2018-02-14 RX ORDER — HYDROCHLOROTHIAZIDE 12.5 MG/1
25 TABLET ORAL DAILY
Status: DISCONTINUED | OUTPATIENT
Start: 2018-02-14 | End: 2018-02-16 | Stop reason: HOSPADM

## 2018-02-14 RX ADMIN — HYDROCHLOROTHIAZIDE 25 MG: 12.5 TABLET ORAL at 11:43

## 2018-02-14 RX ADMIN — DILTIAZEM HYDROCHLORIDE 240 MG: 240 CAPSULE, EXTENDED RELEASE ORAL at 09:39

## 2018-02-14 RX ADMIN — Medication 600 MG: at 21:33

## 2018-02-14 RX ADMIN — LISINOPRIL 10 MG: 10 TABLET ORAL at 07:29

## 2018-02-14 RX ADMIN — AMLODIPINE BESYLATE 10 MG: 10 TABLET ORAL at 07:29

## 2018-02-14 RX ADMIN — SENNOSIDES AND DOCUSATE SODIUM 1 TABLET: 8.6; 5 TABLET ORAL at 07:29

## 2018-02-14 RX ADMIN — Medication 600 MG: at 09:39

## 2018-02-14 RX ADMIN — Medication 2 G: at 05:07

## 2018-02-14 RX ADMIN — HYDRALAZINE HYDROCHLORIDE 20 MG: 20 INJECTION INTRAMUSCULAR; INTRAVENOUS at 02:46

## 2018-02-14 RX ADMIN — Medication 10 MG: at 01:50

## 2018-02-14 RX ADMIN — HEPARIN SODIUM 5000 UNITS: 5000 INJECTION, SOLUTION INTRAVENOUS; SUBCUTANEOUS at 14:12

## 2018-02-14 RX ADMIN — FLUOXETINE HYDROCHLORIDE 20 MG: 10 CAPSULE ORAL at 07:29

## 2018-02-14 RX ADMIN — HEPARIN SODIUM 5000 UNITS: 5000 INJECTION, SOLUTION INTRAVENOUS; SUBCUTANEOUS at 21:33

## 2018-02-14 RX ADMIN — SENNOSIDES AND DOCUSATE SODIUM 1 TABLET: 8.6; 5 TABLET ORAL at 21:33

## 2018-02-14 RX ADMIN — Medication 10 MG: at 07:57

## 2018-02-14 RX ADMIN — LISINOPRIL 10 MG: 10 TABLET ORAL at 19:23

## 2018-02-14 RX ADMIN — Medication 10 MG: at 07:31

## 2018-02-14 RX ADMIN — Medication 600 MG: at 14:11

## 2018-02-14 RX ADMIN — HEPARIN SODIUM 5000 UNITS: 5000 INJECTION, SOLUTION INTRAVENOUS; SUBCUTANEOUS at 05:07

## 2018-02-14 RX ADMIN — Medication 10 MG: at 01:33

## 2018-02-14 ASSESSMENT — VISUAL ACUITY
OU: NORMAL ACUITY

## 2018-02-14 NOTE — PLAN OF CARE
Problem: Patient Care Overview  Goal: Plan of Care/Patient Progress Review    4A: OT orders acknowledged. Per PT, pt is mobilizing well and may only one require one discipline to meet goals for discharge home. OT will HOLD eval at this time and PT will inform if OT needs arise.

## 2018-02-14 NOTE — PLAN OF CARE
Problem: Aortic Aneurysm/Dissection Repair (Adult)  Intervention: Maintain Blood Pressure Within Desired Range  D/I/A: AO x 4 and following commands. Neuro status WDL. -140's w/ MAP 85-95. Md notified @ 2200 of MAP 95; patient given PO labetalol. Groin sites WDL and CMS intact. Tolerating PO and up to chair well.    P: Hold evening scheduled PO antihypertensive medications per SICU resident. MAP 80-90; PRN labatolol if MAP > 95. Monitor groin, CMS, and neuro status Q hour. Monitor urine output notify if not voiding Q 6 hours.

## 2018-02-14 NOTE — PROVIDER NOTIFICATION
"Patient agitated regarding urinary catheter; citing \"I'm going to pull it out right now\" and grabbing at tubing. Urinary catheter was removed per RN for de-escalation and SICU resident notified. Plan to monitor urine output with urinal.  "

## 2018-02-14 NOTE — PROGRESS NOTES
[facility] Vascular Progress Note           Assessment and Plan:       47 year old male with penetrating aortic ulcer of descending thoracic aorta now s/p endovascular repair on 2/13.  - MAP goal 80-90 mmHg; continue home dosed Labetalol, Norvasc, Cardizem and Lisinopril.  PRN IV Labetalol to achieve target BP/MAP  - Neuro checks q2h  - PT/OT  - Regular Diet  - SQ Heparin DVT PPx  - If BPs stable after AM oral doses, transfer to   - If hospital course remains uneventful, anticipate discharge home Friday    Patient seen and discussed with Dr. Kayden Tenorio MD  Cardiovascular Disease Fellow        Interval History:     Endovascular repair of thoracic aortic penetrating ulcer and aneurysm  Denies chest/back pain, weakness in the extremities          Medications:       diltiazem  240 mg Oral Daily     labetalol  600 mg Oral Q8H AFSANEH     lisinopril  10 mg Oral BID     insulin aspart  1-10 Units Subcutaneous TID AC     insulin aspart  1-7 Units Subcutaneous At Bedtime     senna-docusate  1 tablet Oral BID     polyethylene glycol  17 g Oral Daily     amLODIPine  10 mg Oral Daily     FLUoxetine (PROzac) capsule 20 mg  20 mg Oral Daily     heparin  5,000 Units Subcutaneous Q8H             Physical Exam:   Temp:  [97.7  F (36.5  C)-98.2  F (36.8  C)] 98  F (36.7  C)  Heart Rate:  [] 105  Resp:  [13-16] 14  BP: (117-146)/(65-80) 146/75  MAP:  [86 mmHg-106 mmHg] 94 mmHg  Arterial Line BP: (125-151)/(62-80) 137/71  SpO2:  [95 %-99 %] 95 %    Intake/Output Summary (Last 24 hours) at 02/14/18 0805  Last data filed at 02/14/18 0700   Gross per 24 hour   Intake             3484 ml   Output             3025 ml   Net              459 ml       General: alert, interactive, NAD  Eyes: sclera anicteric, EOMI  Cardiovascular: regular rate and rhythm, normal S1 and S2, no murmurs, no gallops, no rubs  Resp: clear to auscultation bilaterally, no rales, wheezes, or rhonchi  GI: soft, nontender,  nondistended. +BS.    Extremities:warm, well perfused, non-edematous, femoral access sites are soft, no hematomas identified  Skin: no jaundice or rash  Neuro: moves all extremities equally  Psych: alert & oriented x 3              Data:   ROUTINE IP LABS (Last four results)  BMP  Recent Labs  Lab 02/14/18  0249 02/13/18  0639 02/10/18  0814 02/09/18  0457     --   --  137   POTASSIUM 4.3 4.3 4.3 3.9   CHLORIDE 114*  --   --  106   LYNNE 8.4*  --   --  9.5   CO2 21  --   --  23   BUN 17  --   --  22   CR 0.76 1.08  --  0.95   *  --   --  162*     CBC  Recent Labs  Lab 02/14/18 0249 02/13/18 0639 02/09/18 0457 02/08/18  2050   WBC 8.6 7.9 8.2  --    RBC 3.69* 4.03* 3.88*  --    HGB 11.4* 12.6* 12.3*  --    HCT 35.3* 39.1* 37.3*  --    MCV 96 97 96  --    MCH 30.9 31.3 31.7  --    MCHC 32.3 32.2 33.0  --    RDW 13.1 13.3 12.8  --     234 251 262     INR  Recent Labs  Lab 02/13/18 0639   INR 1.04

## 2018-02-14 NOTE — PLAN OF CARE
Problem: Patient Care Overview  Goal: Plan of Care/Patient Progress Review  Outcome: No Change  D: Afebrile. Sinus rhythm. Normotensive. A&O. On RA.  I/A: Neuro intact. No changes. CMS and pulses intact. Groin sites good. MAP goal 80-95. A line not correlating with cuff at times. PRN labetalol given. Order received for PRN hydralazine. Using urinal, adequate UOP. Maintenance NS at 75cc/hr.   P: Continue to monitor and assess pt. Consult SICU with any new changes/concerns.

## 2018-02-14 NOTE — PROGRESS NOTES
SURGICAL ICU PROGRESS NOTE  February 14, 2018      CO-MORBIDITIES:   No diagnosis found.    ASSESSMENT: Tiffany Lenz is a 47 year old male with history of HTN and known type B aortic dissection who is admitted to the SICU after thoracic endovascular repair of the aorta distal to the left subclavian artery with stent placement through the right femoral artery on 02/13/18 with Dr. Monique. Patient has been stable in ICU with occasional prn blood pressure medications.     TODAY'S PROGRESS/PLANS:   - if BP stable for 4 hours without significant need for prn Labetalol, transfer to   - d/c norco, continue prn oxycodone  - Q2 hr neuro checks per primary  - restart HCTZ  - mIVF TKO  - restart on subQ heparin per vascular team  - remove Robles    PLAN:    Neuro/ pain/ sedation:  -Monitor neurological status. Notify the MD for any acute changes in exam.  - Q1 hr bilateral lower extremity motor and sensory neurochecks for 12 hours                          - Plantar and dorsiflexion for first 4 hours                          - Hip flexion at hour 5   - prn Tylenol   - Oxycodone 2.5-5 Q4 for pain.  - PTA fluoxetine     Pulmonary care:   - Supplemental oxygen to keep saturation above 92 %.  - Incentive spirometer every 15- 30 minutes when awake.    Cardiovascular:    - Monitor hemodynamic status                          - Maintain MAPs greater than 80 per vascular surgery                          - Goal of map 80-90 per vascular surgery   - PTA medications: HCTZ, lisinopril, amlodipine, labetalol listed from d/c from [place]  - Restarted lisinopril 10 BID, amlodipine 10 daily, HCTZ    - PRN IV labetalol     GI care:   - No issues     Fluids/ Electrolytes/ Nutrition:   - mIVF TKO  - Regular diet   - No indication for parenteral nutrition.    Renal/ Fluid Balance:    - Urine output is  adequate so far.  - Will continue to monitor intake and output.    Endocrine:    Sliding scale for diabetes management.  - Hold PTA metformin      ID/ Antibiotics:  - No indication for antibiotics    Heme:     - Hemoglobin stable  - CBC daily     MSK:  - PT/OT    Prophylaxis:    - Mechanical prophylaxis and Heparin     Lines/ tubes/ drains:  - PIV x2,     Disposition:  - Plan to transfer to unit 6B    ====================================    SUBJECTIVE:   No acute events overnight. Occasional prn labetalol to decrease MAPs into 80-90 target range. Denies pain. Tolerating diet. Robles with good urine production. No bowel movement but passing flatus. Denies CP, SOB, fever, aches, chills. Additionally denies changes in motor or sensory function of the lower extremities.     OBJECTIVE:   1. VITAL SIGNS:   Temp:  [97.7  F (36.5  C)-98.2  F (36.8  C)] 98  F (36.7  C)  Heart Rate:  [] 105  Resp:  [13-16] 14  BP: (117-146)/(65-80) 146/75  MAP:  [86 mmHg-106 mmHg] 94 mmHg  Arterial Line BP: (125-151)/(62-80) 137/71  SpO2:  [95 %-99 %] 95 %  Resp: 14    2. INTAKE/ OUTPUT:   I/O last 3 completed shifts:  In: 3423.5 [P.O.:620; I.V.:2803.5]  Out: 3025 [Urine:2995; Blood:30]    3. PHYSICAL EXAMINATION:   General: Resting comfortably in chair, no acute distress   Neuro: A&Ox3  Resp: Breathing non-labored on RA  CV: RRR  Abdomen: Soft, Non-distended, Non-tende  Incisions: Bilateral femoral artery access sites with dermabond in place and no bleeding  Extremities: lower extremities motor and sensation intact, palpable DP pulses bilaterally     4. INVESTIGATIONS:   Arterial Blood Gases   No lab results found in last 7 days.  Complete Blood Count     Recent Labs  Lab 02/14/18  0249 02/13/18  0639 02/09/18  0457 02/08/18  2050   WBC 8.6 7.9 8.2  --    HGB 11.4* 12.6* 12.3*  --     234 251 262     Basic Metabolic Panel    Recent Labs  Lab 02/14/18  0249 02/13/18  0639 02/10/18  0814 02/09/18  0457     --   --  137   POTASSIUM 4.3 4.3 4.3 3.9   CHLORIDE 114*  --   --  106   CO2 21  --   --  23   BUN 17  --   --  22   CR 0.76 1.08  --  0.95   *  --   --   162*     Liver Function Tests    Recent Labs  Lab 02/13/18  0639   INR 1.04     Pancreatic Enzymes  No lab results found in last 7 days.  Coagulation Profile    Recent Labs  Lab 02/13/18  0639   INR 1.04     Lactate  Invalid input(s): LACTATE    5. RADIOLOGY:   Recent Results (from the past 24 hour(s))   IR OR Angiogram    Narrative    This exam was marked as non-reportable because it will not be read by a   radiologist or a [place] non-radiologist provider.                 =========================================      Patient seen, findings and plan discussed with surgical ICU staff Dr. Velasquez.    NUSRAT Cantrell   SICU resident   [phone]

## 2018-02-14 NOTE — PLAN OF CARE
Problem: Patient Care Overview  Goal: Plan of Care/Patient Progress Review  PT 4A Discharge Planner PT   Patient plan for discharge: Home, going to ask sisters if they can help him with some things  Current status: Mobilizing well with SBA and MAP's within or very near goal range 80-90.  Ambulated x150ft without AD and good stability.  Some expected soreness at R groin site.  Barriers to return to prior living situation: None from mobility perspective.  Will need to arrange rides for cardiac rehab  Recommendations for discharge: Home with OP CR   Rationale for recommendations: Strength and balance are intact and pt is tolerating ambulation well POD1.  Pt lives alone; do need to further assess high-level ADL's prior to d/c as well as confirm any assistance that his sisters can provide       Entered by: Esther Paz 02/14/2018 9:21 AM

## 2018-02-15 ENCOUNTER — OFFICE VISIT (OUTPATIENT)
Dept: INTERPRETER SERVICES | Facility: CLINIC | Age: 48
End: 2018-02-15
Payer: COMMERCIAL

## 2018-02-15 ENCOUNTER — APPOINTMENT (OUTPATIENT)
Dept: PHYSICAL THERAPY | Facility: CLINIC | Age: 48
End: 2018-02-15
Attending: SURGERY
Payer: COMMERCIAL

## 2018-02-15 LAB
GLUCOSE BLDC GLUCOMTR-MCNC: 134 MG/DL (ref 70–99)
GLUCOSE BLDC GLUCOMTR-MCNC: 154 MG/DL (ref 70–99)
GLUCOSE BLDC GLUCOMTR-MCNC: 281 MG/DL (ref 70–99)

## 2018-02-15 PROCEDURE — T1013 SIGN LANG/ORAL INTERPRETER: HCPCS | Mod: U3

## 2018-02-15 PROCEDURE — 97110 THERAPEUTIC EXERCISES: CPT | Mod: GP

## 2018-02-15 PROCEDURE — 86850 RBC ANTIBODY SCREEN: CPT | Performed by: STUDENT IN AN ORGANIZED HEALTH CARE EDUCATION/TRAINING PROGRAM

## 2018-02-15 PROCEDURE — 00000146 ZZHCL STATISTIC GLUCOSE BY METER IP

## 2018-02-15 PROCEDURE — 25000132 ZZH RX MED GY IP 250 OP 250 PS 637: Performed by: SURGERY

## 2018-02-15 PROCEDURE — 25000132 ZZH RX MED GY IP 250 OP 250 PS 637: Performed by: STUDENT IN AN ORGANIZED HEALTH CARE EDUCATION/TRAINING PROGRAM

## 2018-02-15 PROCEDURE — 97116 GAIT TRAINING THERAPY: CPT | Mod: GP

## 2018-02-15 PROCEDURE — 86900 BLOOD TYPING SEROLOGIC ABO: CPT | Performed by: STUDENT IN AN ORGANIZED HEALTH CARE EDUCATION/TRAINING PROGRAM

## 2018-02-15 PROCEDURE — 85027 COMPLETE CBC AUTOMATED: CPT | Performed by: STUDENT IN AN ORGANIZED HEALTH CARE EDUCATION/TRAINING PROGRAM

## 2018-02-15 PROCEDURE — 12000008 ZZH R&B INTERMEDIATE UMMC

## 2018-02-15 PROCEDURE — 25000128 H RX IP 250 OP 636: Performed by: SURGERY

## 2018-02-15 PROCEDURE — 40000193 ZZH STATISTIC PT WARD VISIT

## 2018-02-15 PROCEDURE — 25000132 ZZH RX MED GY IP 250 OP 250 PS 637: Performed by: PHYSICIAN ASSISTANT

## 2018-02-15 PROCEDURE — 25000128 H RX IP 250 OP 636: Performed by: STUDENT IN AN ORGANIZED HEALTH CARE EDUCATION/TRAINING PROGRAM

## 2018-02-15 PROCEDURE — 86901 BLOOD TYPING SEROLOGIC RH(D): CPT | Performed by: STUDENT IN AN ORGANIZED HEALTH CARE EDUCATION/TRAINING PROGRAM

## 2018-02-15 PROCEDURE — 97530 THERAPEUTIC ACTIVITIES: CPT | Mod: GP

## 2018-02-15 RX ORDER — LISINOPRIL 10 MG/1
10 TABLET ORAL 2 TIMES DAILY
Qty: 30 TABLET | Refills: 0 | Status: SHIPPED | OUTPATIENT
Start: 2018-02-15 | End: 2023-06-03

## 2018-02-15 RX ORDER — DILTIAZEM HYDROCHLORIDE 240 MG/1
240 CAPSULE, COATED, EXTENDED RELEASE ORAL DAILY
Qty: 30 CAPSULE | Refills: 0 | Status: SHIPPED | OUTPATIENT
Start: 2018-02-15 | End: 2023-06-03

## 2018-02-15 RX ADMIN — DILTIAZEM HYDROCHLORIDE 240 MG: 240 CAPSULE, EXTENDED RELEASE ORAL at 08:43

## 2018-02-15 RX ADMIN — Medication 600 MG: at 22:20

## 2018-02-15 RX ADMIN — HYDROCHLOROTHIAZIDE 25 MG: 12.5 TABLET ORAL at 08:31

## 2018-02-15 RX ADMIN — LISINOPRIL 10 MG: 10 TABLET ORAL at 08:31

## 2018-02-15 RX ADMIN — Medication 600 MG: at 13:43

## 2018-02-15 RX ADMIN — Medication 600 MG: at 04:54

## 2018-02-15 RX ADMIN — LISINOPRIL 10 MG: 10 TABLET ORAL at 20:03

## 2018-02-15 RX ADMIN — Medication 20 MG: at 00:12

## 2018-02-15 RX ADMIN — FLUOXETINE HYDROCHLORIDE 20 MG: 10 CAPSULE ORAL at 08:31

## 2018-02-15 RX ADMIN — HEPARIN SODIUM 5000 UNITS: 5000 INJECTION, SOLUTION INTRAVENOUS; SUBCUTANEOUS at 05:03

## 2018-02-15 RX ADMIN — AMLODIPINE BESYLATE 10 MG: 10 TABLET ORAL at 08:43

## 2018-02-15 ASSESSMENT — VISUAL ACUITY
OU: NORMAL ACUITY

## 2018-02-15 NOTE — PLAN OF CARE
Problem: Patient Care Overview  Goal: Plan of Care/Patient Progress Review  Outcome: No Change  D: Afebrile. Sinus rhythm. MAP goal 80-90. A&O. On RA.  I/A: Neuro intact. No changes. CMS and pulses intact. Groin sites good. MAP goal 80-90.  PRN labetalol given x1. Pt removed PIVs x3, unable to give other PRN dose around 0400. Pt refused. MD aware and at the bedside using phone for translation. Per MD request pt agreed to take PO 600mg labetolol 1 hour early. Refused AM labs. Pt agrees with MD to discuss plan with team this morning. Using urinal, adequate UOP.  P: Continue to monitor and assess pt. Q2 hour neuro checks. Plan to transfer to  when bed available. Consult vascular with any new changes/concerns.

## 2018-02-15 NOTE — PROGRESS NOTES
VASCULAR SURGERY PROGRESS NOTE    SUBJECTIVE:  Patient walking around room, wants to go home.  Refusing all cares.  Denies any abdominal or chest pain.     OBJECTIVE:  Vital signs:  Temp: 98.6  F (37  C) Temp src: Oral BP: 106/61 Pulse: 80 Heart Rate: 66 Resp: 14 SpO2: 95 % O2 Device: None (Room air)        Intake/Output Summary (Last 24 hours) at 02/15/18 0910  Last data filed at 02/15/18 0500   Gross per 24 hour   Intake             1030 ml   Output             1350 ml   Net             -320 ml       Vitals:    02/11/18 0557 02/13/18 1545 02/14/18 1300   Weight: 62.1 kg (136 lb 14.5 oz) 64.5 kg (142 lb 3.2 oz) 52.9 kg (116 lb 10 oz)       PHYSICAL EXAM:  NEURO/PSYCH: The patient is alert and oriented.  Appropriate.  Moves all extremities.    SKIN:  Warm and dry.  PULMONARY: non-labored breathing, not requiring supplemental oxygen  EXTREMITIES: warm and well perfused     Current Outpatient Prescriptions   Medication Sig Dispense Refill     lisinopril (PRINIVIL/ZESTRIL) 10 MG tablet Take 1 tablet (10 mg) by mouth 2 times daily 30 tablet 0     diltiazem 240 MG 24 hr capsule Take 1 capsule (240 mg) by mouth daily 30 capsule 0       BMP  Recent Labs  Lab 02/14/18  0249 02/13/18  0639 02/10/18  0814 02/09/18  0457     --   --  137   POTASSIUM 4.3 4.3 4.3 3.9   CHLORIDE 114*  --   --  106   CO2 21  --   --  23   BUN 17  --   --  22   CR 0.76 1.08  --  0.95   *  --   --  162*   MAG 2.0  --  2.1  --    PHOS 3.1  --  4.0  --      CBC  Recent Labs  Lab 02/14/18 0249 02/13/18  0639 02/09/18  0457 02/08/18  2050   WBC 8.6 7.9 8.2  --    HGB 11.4* 12.6* 12.3*  --     234 251 262     INR/PTT  Recent Labs  Lab 02/13/18  0639   INR 1.04         ASSESSMENT:  47 year old male with type B thoracic aortic dissection, intramural hematoma.  On 2/14/2018 he underwent TEVAR graft placement in descending thoracic aorta with Dr. Monique.  Completion angiogram done was without endoleak      PLAN:  - okay to discharge to  home  - if unable to get ride home today, transfer to floor   - Dr. Monique explained to patient importance of taking blood pressure medications at home  - systolic blood pressure goal 120 and heart rate goal 60  - follow up CT scan and office visit with Dr. Monique in one month    Holly MATTHEWS, CNS  Division of Vascular Surgery  [facility]  Pager [phone]

## 2018-02-15 NOTE — PLAN OF CARE
Problem: Patient Care Overview  Goal: Individualization & Mutuality  Outcome: Improving  Problem: Patient Care Overview  Goal: Plan of Care/Patient Progress Review  Outcome: Improving    D/I: 46yo male, s/p (POD1) endovascular anuerysm repair.    Neuro: A&OX4, moves all extremities equally, PERRLA, sensation intact. Patient is Hmong-speaking but speaks/understands enough English to complete exams.  present for first assessment, all questions answered. Neuro. Checks Q2h.  Pulm: Lungs clear, O2S 95% room air. EtCO2 DC'd 24h postop.  CV: Hr 70-90s NSR. MAP goal 80-90, on large doses of anti-hypertensives. 1 PRN dose Labetalol given this morning. Maintaining better control this afternoon.  PV: 2+ radial & DP pulses, CMS intact.   Skin: b/l groin incisions CDI, no hematoma.  GI: Normoactive bowel tones, LBM 2/12. Ate 100% breakfast, has had poor appetite this afternoon. Snacks and fluids encouraged.  : Adequate urine output, voids in urinal.  MS: +5 strength all extremities. Ambulated 2 laps around unit X2 today. Patient states he walks frequently at home.  Psych/Social: Per patient request, RN updated patient's brother, Va, via telephone. Patient lives alone but states has sisters and brother to help him if needed.    P: Continue Q2h neuro checks, monitor BP, increase mobility, encourage PO intake. Notify team of any concerns.

## 2018-02-15 NOTE — PROVIDER NOTIFICATION
Pt refuses vital signs, pt refuses medications, pt refuses to call for help, pt refuses assessments to be done.

## 2018-02-15 NOTE — DISCHARGE SUMMARY
VASCULAR SURGERY HOSPITAL DISCHARGE SUMMARY    ADMISSION DATE:  2/3/2018  ADMISSION DIAGNOSES: Hx of repair of dissecting thoracic aortic aneurysm, [place] type B [Z98.890, Z86.79]    DISCHARGE DATE:  2/15/2018  DISCHARGE DIAGNOSES:   Problem List Items Addressed This Visit        Circulatory    Aortic dissection, thoracic (H) - Primary    Relevant Medications    lisinopril (PRINIVIL/ZESTRIL) 10 MG tablet    diltiazem 240 MG 24 hr capsule    Other Relevant Orders    CTA Angiogram Chest/Abd/Pelvis w Processing          CONDITION AT DISCHARGE: Stable  Discharge Disposition   Discharged to home    DISCHARGING PROVIDER: BYRON Gomez, CNS   DATE OF SERVICE: 02/15/18    VASCULAR SURGEON: Dr. Monique  Procedure(s):  thoracic endovascular repair of aorta distal to left subclavian artery.  Intravascular ultrasouns of aortic arch, thoracic aorta, and abdominal aorta.  ultrasound guided vascular accessx2, bilateral lower extremity arterial duplex limited study, percutaneous closure of endovascular access x1.  aorta catheterization x1. - Wound Class: I-Clean    Consultations This Hospital Stay   MEDICATION HISTORY IP PHARMACY CONSULT  VASCULAR ACCESS CARE ADULT IP CONSULT  VASCULAR ACCESS CARE ADULT IP CONSULT  PHYSICAL THERAPY ADULT IP CONSULT  OCCUPATIONAL THERAPY ADULT IP CONSULT  OCCUPATIONAL THERAPY ADULT IP CONSULT  ENDOCRINE DIABETES ADULT IP CONSULT    HPI:  Tiffany Lenz is a 47 year old male with past medical history of HTN and known type B thoracic aortic dissection who was transferred to [facility] from [facility] on 2/3/2018 for worsening type B thoracic aortic dissection. We kept him in the hospital on IV blood pressure medicines and converted him to oral agents. His pain and ulcer progression were stabilized over the last week.  Due to the sheer size of the ulcer it made risk of rupture very significant and treatment plans were make for appropriate stent grafting.  On 2/13/2018, he underwent endovascular  repair of thoracic aortic penetrating ulcer with Dr. Monique.  His hospital course was uncomplicated.  He had return of his normal bowel and bladder function.  His pain was controlled with Tylenol.  He tolerated a regular diet and his pain was controlled with Tylenol.  He will have a follow up appointment with Dr. Monique in one month with CT scan.  He was strongly encouraged to monitor his blood pressure and take his blood pressure medications.  He had moderate pruritis on the am of discharge and was given prn benadryl and hydrocortisone cream to apply as this was likely a contact dermatitis.  He was discharged to home in stable condition on 2/16/2018.    PHYSICAL EXAM:  NEURO/PSYCH: The patient is alert and oriented.  Appropriate.  Moves all extremities.    SKIN:  Warm and dry.  PULMONARY: non-labored breathing, not requiring supplemental oxygen  EXTREMITIES: warm and well perfused       PRIMARY CARE PROVIDER:  Primary Care Physician   Lindsey Ames      Code Status   Full Code      Discharge Orders     CTA Angiogram Chest/Abd/Pelvis w Processing   Status post TEVAR for penetrating aortic ulcer     Follow Up and recommended labs and tests   Follow up with Dr. Monique in one month with CT scan    With questions, concerns, or to request an appointment, please call either:    Melissa Warren, Care Coordinator RN, Vascular Surgery  [phone]    Vascular Call Center  [phone]    To contact someone after 5 pm, on a weekend, or on a Holiday, please call:  [facility]  [phone], option 4 to have a member of the Vascular Surgery Service paged.     Activity   Your activity upon discharge: no heavy lifting for 2 weeks nothing over 10 lbs     Wound care and dressings   Instructions to care for your wound at home: okay to keep bilateral groin incisions open to air     Reason for your hospital stay   You underwent a thoracic stent placement for penetrating aortic ulcer     When to contact  your care team   Please keep record of your blood pressure.  Systolic blood pressure goal (top number) under 120.  Heart rate goal 60.  Please check blood pressure and heart rate twice a day     When to contact your care team   Contact Dr. Monique's vascular surgery team if any increased pain, swelling, fever or incision drainage develop.     Full Code     Diet   Follow this diet upon discharge:     Regular Diet Adult       Discharge Medications   Current Discharge Medication List      START taking these medications    Details   diltiazem 240 MG 24 hr capsule Take 1 capsule (240 mg) by mouth daily  Qty: 30 capsule, Refills: 0    Associated Diagnoses: Aortic dissection, thoracic (H)         CONTINUE these medications which have CHANGED    Details   lisinopril (PRINIVIL/ZESTRIL) 10 MG tablet Take 1 tablet (10 mg) by mouth 2 times daily  Qty: 30 tablet, Refills: 0    Associated Diagnoses: Aortic dissection, thoracic (H)         CONTINUE these medications which have NOT CHANGED    Details   FLUOXETINE HCL PO Take 20 mg by mouth daily      HYDROCHLOROTHIAZIDE PO Take 25 mg by mouth daily      metFORMIN (GLUCOPHAGE-XR) 500 MG 24 hr tablet Take 2,000 mg by mouth daily (with dinner)      LABETALOL HCL PO Take 600 mg by mouth 3 times daily      AMLODIPINE BESYLATE PO Take 10 mg by mouth daily           Allergies   No Known Allergies    Time Spent on this Encounter   I, Marilyn Jacques MD, personally saw the patient on date of discharge and spent less than or equal to 30 minutes discharging this patient.    Marilyn Jacques MD  Vascular Surgery Fellow  Pager [phone]    Holly MATTHEWS, CNS  Division of Vascular Surgery  [facility]  Pager [phone]

## 2018-02-15 NOTE — PLAN OF CARE
Problem: Patient Care Overview  Goal: Plan of Care/Patient Progress Review  PT / 4AB -    Discharge Planner PT   Patient plan for discharge: home with OP CR  Current status: Performing transfers + supervision.  Amb entire unit x 2 with no AD and dynamic balance challenges; no overt LOB.  Climbed 13 stairs with unilateral handrail support + supervision.  Engaged in seated LE exercises.  Barriers to return to prior living situation: strength/endurance  Recommendations for discharge: home with OP CR + PRN assistance from family  Rationale for recommendations: Pt currently performing all functional transfers and gait + supervision and no balance concerns.  Pt will continue progressing functional in OP CR.        Entered by: Mary Jo Dumont 02/15/2018 3:28 PM

## 2018-02-15 NOTE — PROGRESS NOTES
"Lab at the bedside at 0400 to draw morning labs. Patient refused. MAP of 99, RN went to grab PRN labetalol. Pt took out 3 PIVs. No other access. Refusing any medications or new IV and all cares. MD aware and coming to see pt. Pt stating, \"call my brother I am leaving now.\"   "

## 2018-02-15 NOTE — PROGRESS NOTES
"02/15/2018  General Surgery Cross Cover Note    Was called to bedside to evaluate patient Tiffany Lenz for complaints of patient noncomplaince. Patient states he wanted to discharge. Per RN, he removed his IV's himself. His BP was elevated past his goal MAP's. Spoke with patient via  line. He understood that he was risking his health and possibly even his life by not allowing us to intervene and control his blood pressure. He understood and still refused cares.     Exam:   /88  Pulse 80  Temp 98.6  F (37  C) (Oral)  Resp 14  Ht 1.448 m (4' 9\")  Wt 52.9 kg (116 lb 10 oz)  SpO2 96%  BMI 25.24 kg/m2  General: Alert, interactive, & in NAD  Resp: Nonlabored breathing on RA  Cardiac: Regular rate; extremities warm;   Abdomen: Soft, nontender, nondistended.  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    Plan:  -Gave patient his AM dose of labetalol PO early  -Advised patient he was risking his health and the consequences could be severe, the patient understood and still refused care and IV re-insertion.  -He was allowing us to give him his heparin shot    Chris Zazueta DO   General Surgery PGY1  [phone]  (After 6AM please page primary team)  "

## 2018-02-16 ENCOUNTER — OFFICE VISIT (OUTPATIENT)
Dept: INTERPRETER SERVICES | Facility: CLINIC | Age: 48
End: 2018-02-16
Payer: COMMERCIAL

## 2018-02-16 VITALS
DIASTOLIC BLOOD PRESSURE: 62 MMHG | OXYGEN SATURATION: 97 % | BODY MASS INDEX: 22.93 KG/M2 | HEIGHT: 60 IN | HEART RATE: 65 BPM | SYSTOLIC BLOOD PRESSURE: 110 MMHG | RESPIRATION RATE: 16 BRPM | WEIGHT: 116.8 LBS | TEMPERATURE: 96.9 F

## 2018-02-16 LAB
BLD PROD TYP BPU: NORMAL
BLD PROD TYP BPU: NORMAL
BLD UNIT ID BPU: 0
BLD UNIT ID BPU: 0
BLOOD PRODUCT CODE: NORMAL
BLOOD PRODUCT CODE: NORMAL
BPU ID: NORMAL
BPU ID: NORMAL
GLUCOSE BLDC GLUCOMTR-MCNC: 179 MG/DL (ref 70–99)
TRANSFUSION STATUS PATIENT QL: NORMAL

## 2018-02-16 PROCEDURE — 00000146 ZZHCL STATISTIC GLUCOSE BY METER IP

## 2018-02-16 PROCEDURE — 25000132 ZZH RX MED GY IP 250 OP 250 PS 637: Performed by: STUDENT IN AN ORGANIZED HEALTH CARE EDUCATION/TRAINING PROGRAM

## 2018-02-16 PROCEDURE — 25000132 ZZH RX MED GY IP 250 OP 250 PS 637: Performed by: PHYSICIAN ASSISTANT

## 2018-02-16 PROCEDURE — 25000128 H RX IP 250 OP 636: Performed by: SURGERY

## 2018-02-16 PROCEDURE — T1013 SIGN LANG/ORAL INTERPRETER: HCPCS | Mod: U3

## 2018-02-16 PROCEDURE — 25000132 ZZH RX MED GY IP 250 OP 250 PS 637: Performed by: SURGERY

## 2018-02-16 RX ORDER — DIPHENHYDRAMINE HCL 25 MG
50 CAPSULE ORAL EVERY 6 HOURS PRN
Status: DISCONTINUED | OUTPATIENT
Start: 2018-02-16 | End: 2018-02-16 | Stop reason: HOSPADM

## 2018-02-16 RX ORDER — DIPHENHYDRAMINE HCL 50 MG
50 CAPSULE ORAL EVERY 6 HOURS PRN
Qty: 56 CAPSULE | Refills: 1 | Status: SHIPPED | OUTPATIENT
Start: 2018-02-16 | End: 2023-06-03

## 2018-02-16 RX ORDER — DIAPER,BRIEF,INFANT-TODD,DISP
EACH MISCELLANEOUS 2 TIMES DAILY
Qty: 30 G | Refills: 1 | Status: SHIPPED | OUTPATIENT
Start: 2018-02-16 | End: 2023-06-03

## 2018-02-16 RX ORDER — DIAPER,BRIEF,INFANT-TODD,DISP
EACH MISCELLANEOUS 2 TIMES DAILY
Status: DISCONTINUED | OUTPATIENT
Start: 2018-02-16 | End: 2018-02-16 | Stop reason: HOSPADM

## 2018-02-16 RX ADMIN — Medication 600 MG: at 06:00

## 2018-02-16 RX ADMIN — LISINOPRIL 10 MG: 10 TABLET ORAL at 08:18

## 2018-02-16 RX ADMIN — DILTIAZEM HYDROCHLORIDE 240 MG: 240 CAPSULE, EXTENDED RELEASE ORAL at 08:18

## 2018-02-16 RX ADMIN — DIPHENHYDRAMINE HYDROCHLORIDE 50 MG: 25 CAPSULE ORAL at 08:18

## 2018-02-16 RX ADMIN — AMLODIPINE BESYLATE 10 MG: 10 TABLET ORAL at 08:18

## 2018-02-16 RX ADMIN — HEPARIN SODIUM 5000 UNITS: 5000 INJECTION, SOLUTION INTRAVENOUS; SUBCUTANEOUS at 06:00

## 2018-02-16 RX ADMIN — FLUOXETINE HYDROCHLORIDE 20 MG: 10 CAPSULE ORAL at 08:18

## 2018-02-16 RX ADMIN — HYDROCHLOROTHIAZIDE 25 MG: 12.5 TABLET ORAL at 08:18

## 2018-02-16 ASSESSMENT — VISUAL ACUITY
OU: NORMAL ACUITY
OU: NORMAL ACUITY

## 2018-02-16 NOTE — PLAN OF CARE
VSS. Denies pain. Itching throughout body, prn benadryl administered. Discharge paperwork complete, pt family picked up pt, no further questions at this time. Tolerating regular diet. Bilat groin sites c/d/i. Up ad annabelle, voiding and stooling. Cont POC.

## 2018-02-16 NOTE — PROGRESS NOTES
Patient ready for d/c today.  Itching on legs and arms to the point patient caused small areas of patchy erythema and skin abrasions  No chest pain, otherwise in good spirits    B/P: 110/62, T: 96.9, P: 65, R: 16  Alert oriented no acute distress  Denies pain bilateral groin incisions c/d/i  Areas of erythema and slight induration where patient scratching on arms and legs, patchy     WBC   Date Value Ref Range Status   02/14/2018 8.6 4.0 - 11.0 10e9/L Final   ]  Hemoglobin   Date Value Ref Range Status   02/14/2018 11.4 (L) 13.3 - 17.7 g/dL Final   ]  Creatinine   Date Value Ref Range Status   02/14/2018 0.76 0.66 - 1.25 mg/dL Final   ]      Intake/Output Summary (Last 24 hours) at 02/16/18 0828  Last data filed at 02/16/18 0813   Gross per 24 hour   Intake              860 ml   Output              600 ml   Net              260 ml       Assessment/Plan:  46 yo male with penetrating aortic ulcer with associated hematoma POD#3 s/p TEVAR    Continue blood pressure control.  Benadryl and hydrocortisone cream ordered for itching and rash, likely an allergic dermatitis although no medication changes, possibly sensitive to linens  OK to d/c home today with family  Follow up arranged with Dr. Monique and repeat CTA    Marilyn Jacques MD  Vascular Surgery Fellow  Pager [phone]

## 2018-02-16 NOTE — PLAN OF CARE
"Problem: Patient Care Overview  Goal: Plan of Care/Patient Progress Review  Outcome: Improving  /54 (BP Location: Right arm)  Pulse 80  Temp 98.5  F (36.9  C) (Oral)  Resp 16  Ht 1.402 m (4' 7.2\")  Wt 53 kg (116 lb 12.8 oz)  SpO2 96%  BMI 26.95 kg/m2    AVSS.  Pt denies pain or nausea.  Good PO intake. Voiding spont, not saving.  Up independently.  Groin incisions CDI. AVS printed and reviewed with pt.  at bedtime.  Pt verbalized understand the discharge education materials.  Pt acknowledged will follow up with the scheduled appt. Discharge medication was being  and delivered to pt's room.  Pt anticipating leaving at 0600 tomorrow.        "

## 2018-02-16 NOTE — PLAN OF CARE
"Problem: Patient Care Overview  Goal: Plan of Care/Patient Progress Review  Outcome: No Change  /54 (BP Location: Left arm)  Pulse 80  Temp 98.6  F (37  C) (Oral)  Resp 16  Ht 1.402 m (4' 7.2\")  Wt 53 kg (116 lb 12.8 oz)  SpO2 96%  BMI 26.95 kg/m2  Reviewed discharge plan with patient and encourage response to teaching. Patient acknowledged understanding of teaching. Denying any pain. Bilateral groin is intact with no drainage. Soft to touch. Continue with plan of care.       "

## 2018-02-16 NOTE — PLAN OF CARE
Problem: Patient Care Overview  Goal: Plan of Care/Patient Progress Review  Physical Therapy Discharge Summary    Reason for therapy discharge:    Discharged to home with outpatient therapy.    Progress towards therapy goal(s). See goals on Care Plan in [facility] electronic health record for goal details.  Goals partially met.  Barriers to achieving goals:   discharge from facility.    Therapy recommendation(s):    Continued therapy is recommended.  Rationale/Recommendations:  Progress strength and endurance to return to PLOF.

## 2018-02-17 ENCOUNTER — CARE COORDINATION (OUTPATIENT)
Dept: CARE COORDINATION | Facility: CLINIC | Age: 48
End: 2018-02-17

## 2018-03-08 ENCOUNTER — RECORDS - HEALTHEAST (OUTPATIENT)
Dept: LAB | Facility: CLINIC | Age: 48
End: 2018-03-08

## 2018-03-08 LAB
ALBUMIN SERPL-MCNC: 3.7 G/DL (ref 3.5–5)
ALP SERPL-CCNC: 156 U/L (ref 45–120)
ALT SERPL W P-5'-P-CCNC: 25 U/L (ref 0–45)
ANION GAP SERPL CALCULATED.3IONS-SCNC: 12 MMOL/L (ref 5–18)
AST SERPL W P-5'-P-CCNC: 18 U/L (ref 0–40)
BILIRUB SERPL-MCNC: 0.6 MG/DL (ref 0–1)
BUN SERPL-MCNC: 17 MG/DL (ref 8–22)
CALCIUM SERPL-MCNC: 9.8 MG/DL (ref 8.5–10.5)
CHLORIDE BLD-SCNC: 105 MMOL/L (ref 98–107)
CHOLEST SERPL-MCNC: 292 MG/DL
CO2 SERPL-SCNC: 23 MMOL/L (ref 22–31)
CREAT SERPL-MCNC: 0.84 MG/DL (ref 0.7–1.3)
ERYTHROCYTE [DISTWIDTH] IN BLOOD BY AUTOMATED COUNT: 12.4 % (ref 11–14.5)
FASTING STATUS PATIENT QL REPORTED: ABNORMAL
GFR SERPL CREATININE-BSD FRML MDRD: >60 ML/MIN/1.73M2
GLUCOSE BLD-MCNC: 114 MG/DL (ref 70–125)
HCT VFR BLD AUTO: 39.4 % (ref 40–54)
HDLC SERPL-MCNC: 37 MG/DL
HGB BLD-MCNC: 12.7 G/DL (ref 14–18)
LDLC SERPL CALC-MCNC: 191 MG/DL
MCH RBC QN AUTO: 30.9 PG (ref 27–34)
MCHC RBC AUTO-ENTMCNC: 32.2 G/DL (ref 32–36)
MCV RBC AUTO: 96 FL (ref 80–100)
PLATELET # BLD AUTO: 213 THOU/UL (ref 140–440)
PMV BLD AUTO: 11.4 FL (ref 8.5–12.5)
POTASSIUM BLD-SCNC: 4.4 MMOL/L (ref 3.5–5)
PROT SERPL-MCNC: 8.2 G/DL (ref 6–8)
RBC # BLD AUTO: 4.11 MILL/UL (ref 4.4–6.2)
SODIUM SERPL-SCNC: 140 MMOL/L (ref 136–145)
TRIGL SERPL-MCNC: 320 MG/DL
TSH SERPL DL<=0.005 MIU/L-ACNC: 1.51 UIU/ML (ref 0.3–5)
WBC: 8.6 THOU/UL (ref 4–11)

## 2018-04-07 NOTE — PLAN OF CARE
"Problem: Patient Care Overview  Goal: Plan of Care/Patient Progress Review  Outcome: No Change  Shift 2759-1447:  /74 (BP Location: Right arm)  Pulse 61  Temp 98.9  F (37.2  C) (Oral)  Resp 16  Ht 1.448 m (4' 9\")  Wt 62.1 kg (136 lb 14.5 oz)  SpO2 98%  BMI 29.63 kg/m2   Neuro: A&Ox4. Hmong  available but pt was able to speak enough english to communicate.  Cardiac: SR with intermittent bradycardia. VSS. Tmax 99.1. Goal SBP <120 HR<60. No PRN given, maintained with scheduled medications.    Respiratory: Sating 98% on RA. Lung sounds clear.  GI/: Voided in urinal x1 this shift. No BM.   Diet/appetite: Tolerating regular diet. Fair appetite. BG stable and covered with SS insulin.  Activity:  Up independently.   Pain: Denied and rested comfortably throughout shift.   Skin: L forearm with bruise and hematoma present; outlined with skin marker. No changed noted.   LDA's: R PIV patent and SL'ed.     Plan: TEVAR on Tuesday. Continue to monitor and notify primary team with changes.      " no

## 2019-01-04 ENCOUNTER — RECORDS - HEALTHEAST (OUTPATIENT)
Dept: LAB | Facility: CLINIC | Age: 49
End: 2019-01-04

## 2019-01-04 LAB
ALBUMIN SERPL-MCNC: 4.2 G/DL (ref 3.5–5)
ALP SERPL-CCNC: 113 U/L (ref 45–120)
ALT SERPL W P-5'-P-CCNC: 23 U/L (ref 0–45)
ANION GAP SERPL CALCULATED.3IONS-SCNC: 17 MMOL/L (ref 5–18)
AST SERPL W P-5'-P-CCNC: 17 U/L (ref 0–40)
BILIRUB SERPL-MCNC: 0.5 MG/DL (ref 0–1)
BUN SERPL-MCNC: 18 MG/DL (ref 8–22)
CALCIUM SERPL-MCNC: 9.9 MG/DL (ref 8.5–10.5)
CHLORIDE BLD-SCNC: 105 MMOL/L (ref 98–107)
CHOLEST SERPL-MCNC: 251 MG/DL
CO2 SERPL-SCNC: 15 MMOL/L (ref 22–31)
CREAT SERPL-MCNC: 1.23 MG/DL (ref 0.7–1.3)
FASTING STATUS PATIENT QL REPORTED: ABNORMAL
GFR SERPL CREATININE-BSD FRML MDRD: >60 ML/MIN/1.73M2
GLUCOSE BLD-MCNC: 305 MG/DL (ref 70–125)
HDLC SERPL-MCNC: 35 MG/DL
LDLC SERPL CALC-MCNC: 142 MG/DL
POTASSIUM BLD-SCNC: 3.9 MMOL/L (ref 3.5–5)
PROT SERPL-MCNC: 8.1 G/DL (ref 6–8)
SODIUM SERPL-SCNC: 137 MMOL/L (ref 136–145)
TRIGL SERPL-MCNC: 368 MG/DL

## 2019-03-08 ENCOUNTER — RECORDS - HEALTHEAST (OUTPATIENT)
Dept: LAB | Facility: CLINIC | Age: 49
End: 2019-03-08

## 2019-03-08 LAB
ALBUMIN SERPL-MCNC: 3.8 G/DL (ref 3.5–5)
ALP SERPL-CCNC: 126 U/L (ref 45–120)
ALT SERPL W P-5'-P-CCNC: 15 U/L (ref 0–45)
ANION GAP SERPL CALCULATED.3IONS-SCNC: 14 MMOL/L (ref 5–18)
AST SERPL W P-5'-P-CCNC: 18 U/L (ref 0–40)
BILIRUB SERPL-MCNC: 0.3 MG/DL (ref 0–1)
BUN SERPL-MCNC: 18 MG/DL (ref 8–22)
CALCIUM SERPL-MCNC: 9.6 MG/DL (ref 8.5–10.5)
CHLORIDE BLD-SCNC: 109 MMOL/L (ref 98–107)
CHOLEST SERPL-MCNC: 215 MG/DL
CO2 SERPL-SCNC: 19 MMOL/L (ref 22–31)
CREAT SERPL-MCNC: 0.99 MG/DL (ref 0.7–1.3)
FASTING STATUS PATIENT QL REPORTED: ABNORMAL
GFR SERPL CREATININE-BSD FRML MDRD: >60 ML/MIN/1.73M2
GLUCOSE BLD-MCNC: 178 MG/DL (ref 70–125)
HDLC SERPL-MCNC: 32 MG/DL
LDLC SERPL CALC-MCNC: 134 MG/DL
LDLC SERPL CALC-MCNC: ABNORMAL MG/DL
POTASSIUM BLD-SCNC: 4.1 MMOL/L (ref 3.5–5)
PROT SERPL-MCNC: 7.6 G/DL (ref 6–8)
SODIUM SERPL-SCNC: 142 MMOL/L (ref 136–145)
TRIGL SERPL-MCNC: 482 MG/DL

## 2020-06-26 ENCOUNTER — RECORDS - HEALTHEAST (OUTPATIENT)
Dept: ADMINISTRATIVE | Facility: OTHER | Age: 50
End: 2020-06-26

## 2020-07-02 ENCOUNTER — HOSPITAL ENCOUNTER (OUTPATIENT)
Dept: CT IMAGING | Facility: CLINIC | Age: 50
Discharge: HOME | End: 2020-07-02

## 2020-08-31 ENCOUNTER — RECORDS - HEALTHEAST (OUTPATIENT)
Dept: ADMINISTRATIVE | Facility: OTHER | Age: 50
End: 2020-08-31

## 2020-10-09 ENCOUNTER — AMBULATORY - HEALTHEAST (OUTPATIENT)
Dept: CARDIOLOGY | Facility: CLINIC | Age: 50
End: 2020-10-09

## 2020-10-09 ENCOUNTER — RECORDS - HEALTHEAST (OUTPATIENT)
Dept: ADMINISTRATIVE | Facility: OTHER | Age: 50
End: 2020-10-09

## 2020-10-09 ENCOUNTER — COMMUNICATION - HEALTHEAST (OUTPATIENT)
Dept: CARDIOLOGY | Facility: CLINIC | Age: 50
End: 2020-10-09

## 2020-11-19 ENCOUNTER — RECORDS - HEALTHEAST (OUTPATIENT)
Dept: LAB | Facility: CLINIC | Age: 50
End: 2020-11-19

## 2020-11-19 ENCOUNTER — COMMUNICATION - HEALTHEAST (OUTPATIENT)
Dept: SCHEDULING | Facility: CLINIC | Age: 50
End: 2020-11-19

## 2020-11-19 LAB
ALBUMIN SERPL-MCNC: 4.1 G/DL (ref 3.5–5)
ALP SERPL-CCNC: 154 U/L (ref 45–120)
ALT SERPL W P-5'-P-CCNC: 17 U/L (ref 0–45)
ANION GAP SERPL CALCULATED.3IONS-SCNC: 13 MMOL/L (ref 5–18)
AST SERPL W P-5'-P-CCNC: 14 U/L (ref 0–40)
BILIRUB SERPL-MCNC: 0.3 MG/DL (ref 0–1)
BUN SERPL-MCNC: 19 MG/DL (ref 8–22)
CALCIUM SERPL-MCNC: 10.1 MG/DL (ref 8.5–10.5)
CHLORIDE BLD-SCNC: 100 MMOL/L (ref 98–107)
CHOLEST SERPL-MCNC: 222 MG/DL
CO2 SERPL-SCNC: 19 MMOL/L (ref 22–31)
CREAT SERPL-MCNC: 1.28 MG/DL (ref 0.7–1.3)
FASTING STATUS PATIENT QL REPORTED: ABNORMAL
GFR SERPL CREATININE-BSD FRML MDRD: 60 ML/MIN/1.73M2
GLUCOSE BLD-MCNC: 519 MG/DL (ref 70–125)
HDLC SERPL-MCNC: 36 MG/DL
LDLC SERPL CALC-MCNC: 87 MG/DL
LDLC SERPL CALC-MCNC: ABNORMAL MG/DL
POTASSIUM BLD-SCNC: 4.6 MMOL/L (ref 3.5–5)
PROT SERPL-MCNC: 7.4 G/DL (ref 6–8)
SODIUM SERPL-SCNC: 132 MMOL/L (ref 136–145)
TRIGL SERPL-MCNC: 952 MG/DL

## 2021-02-25 ENCOUNTER — RECORDS - HEALTHEAST (OUTPATIENT)
Dept: LAB | Facility: CLINIC | Age: 51
End: 2021-02-25

## 2021-02-25 LAB
ALBUMIN SERPL-MCNC: 4 G/DL (ref 3.5–5)
ALP SERPL-CCNC: 136 U/L (ref 45–120)
ALT SERPL W P-5'-P-CCNC: 23 U/L (ref 0–45)
ANION GAP SERPL CALCULATED.3IONS-SCNC: 11 MMOL/L (ref 5–18)
AST SERPL W P-5'-P-CCNC: 13 U/L (ref 0–40)
BILIRUB SERPL-MCNC: 0.3 MG/DL (ref 0–1)
BUN SERPL-MCNC: 14 MG/DL (ref 8–22)
CALCIUM SERPL-MCNC: 9 MG/DL (ref 8.5–10.5)
CHLORIDE BLD-SCNC: 106 MMOL/L (ref 98–107)
CHOLEST SERPL-MCNC: 253 MG/DL
CO2 SERPL-SCNC: 20 MMOL/L (ref 22–31)
CREAT SERPL-MCNC: 1.09 MG/DL (ref 0.7–1.3)
FASTING STATUS PATIENT QL REPORTED: ABNORMAL
GFR SERPL CREATININE-BSD FRML MDRD: >60 ML/MIN/1.73M2
GLUCOSE BLD-MCNC: 474 MG/DL (ref 70–125)
HDLC SERPL-MCNC: ABNORMAL MG/DL
LDLC SERPL CALC-MCNC: 83 MG/DL
LDLC SERPL CALC-MCNC: ABNORMAL MG/DL
POTASSIUM BLD-SCNC: 4.1 MMOL/L (ref 3.5–5)
PROT SERPL-MCNC: 7.4 G/DL (ref 6–8)
SODIUM SERPL-SCNC: 137 MMOL/L (ref 136–145)
TRIGL SERPL-MCNC: 1123 MG/DL

## 2021-05-28 ENCOUNTER — RECORDS - HEALTHEAST (OUTPATIENT)
Dept: LAB | Facility: CLINIC | Age: 51
End: 2021-05-28

## 2021-05-28 LAB
CHOLEST SERPL-MCNC: 241 MG/DL
FASTING STATUS PATIENT QL REPORTED: ABNORMAL
HDLC SERPL-MCNC: 31 MG/DL
LDLC SERPL CALC-MCNC: 99 MG/DL
LDLC SERPL CALC-MCNC: ABNORMAL MG/DL
TRIGL SERPL-MCNC: 766 MG/DL

## 2021-05-31 ENCOUNTER — RECORDS - HEALTHEAST (OUTPATIENT)
Dept: ADMINISTRATIVE | Facility: CLINIC | Age: 51
End: 2021-05-31

## 2021-05-31 VITALS — BODY MASS INDEX: 28.33 KG/M2 | WEIGHT: 144.3 LBS | HEIGHT: 60 IN

## 2021-06-12 NOTE — TELEPHONE ENCOUNTER
Wellness Screening Tool  Symptom Screening:  Do you have one of the following NEW symptoms:    Fever (subjective or >100.0)?  No    A new cough?  No    Shortness of breath?  No     Chills? No     New loss of taste or smell? No     Generalized body aches? No     New persistent headache? No     New sore throat? No     Nausea, vomiting, or diarrhea?  No    Within the past 2 weeks, have you been exposed to someone with a known positive illness below:    COVID-19 (known or suspected)?  No    Chicken pox?  No    Mealses?  No    Pertussis?  No    Patient notified of visitor policy- They may have one person accompany them to their appointment, but they will need to wear a mask and will be screened upon arrival for symptoms: Yes  Pt informed to wear a mask: Yes  Pt notified if they develop any symptoms listed above, prior to their appointment, they are to call the clinic directly at [phone] for further instructions.  Yes  Patient's appointment status: Patient will be seen in clinic as scheduled on 10/12

## 2021-06-13 NOTE — TELEPHONE ENCOUNTER
Critical lab for July patient. [facility] LEEROY

## 2021-06-17 ENCOUNTER — RECORDS - HEALTHEAST (OUTPATIENT)
Dept: LAB | Facility: CLINIC | Age: 51
End: 2021-06-17

## 2021-06-17 LAB
BASOPHILS # BLD AUTO: 0 THOU/UL (ref 0–0.2)
BASOPHILS NFR BLD AUTO: 1 % (ref 0–2)
CHOLEST SERPL-MCNC: 241 MG/DL
EOSINOPHIL # BLD AUTO: 0.1 THOU/UL (ref 0–0.4)
EOSINOPHIL NFR BLD AUTO: 1 % (ref 0–6)
ERYTHROCYTE [DISTWIDTH] IN BLOOD BY AUTOMATED COUNT: 12 % (ref 11–14.5)
FASTING STATUS PATIENT QL REPORTED: ABNORMAL
HCT VFR BLD AUTO: 46.3 % (ref 40–54)
HDLC SERPL-MCNC: 41 MG/DL
HGB BLD-MCNC: 15.5 G/DL (ref 14–18)
IMM GRANULOCYTES # BLD: 0 THOU/UL
IMM GRANULOCYTES NFR BLD: 1 %
LDLC SERPL CALC-MCNC: 126 MG/DL
LYMPHOCYTES # BLD AUTO: 1.9 THOU/UL (ref 0.8–4.4)
LYMPHOCYTES NFR BLD AUTO: 24 % (ref 20–40)
MCH RBC QN AUTO: 31.1 PG (ref 27–34)
MCHC RBC AUTO-ENTMCNC: 33.5 G/DL (ref 32–36)
MCV RBC AUTO: 93 FL (ref 80–100)
MONOCYTES # BLD AUTO: 0.6 THOU/UL (ref 0–0.9)
MONOCYTES NFR BLD AUTO: 7 % (ref 2–10)
NEUTROPHILS # BLD AUTO: 5.5 THOU/UL (ref 2–7.7)
NEUTROPHILS NFR BLD AUTO: 67 % (ref 50–70)
PLATELET # BLD AUTO: 132 THOU/UL (ref 140–440)
PMV BLD AUTO: 12.5 FL (ref 8.5–12.5)
RBC # BLD AUTO: 4.99 MILL/UL (ref 4.4–6.2)
TRIGL SERPL-MCNC: 370 MG/DL
WBC: 8.1 THOU/UL (ref 4–11)

## 2021-07-03 NOTE — ANESTHESIA PREPROCEDURE EVALUATION
Anesthesia Preprocedure Evaluation by Martha Vang MD at 1/13/2018  3:01 AM     Author: Martha Vang MD Service: -- Author Type: Physician    Filed: 1/13/2018  3:05 AM Date of Service: 1/13/2018  3:01 AM Status: Signed    : Martha Vang MD (Physician)       Anesthesia Evaluation      Patient summary reviewed     Airway   Mallampati: III  Neck ROM: limited  Comment: Large neck diameter   Pulmonary    (+) sleep apnea (suspected, unknown true diagnosis) on other, , wheezes, a smoker                         Cardiovascular   (+) hypertension (nonocompliant with medications for 2+ years) poorly controlled, ,     Rhythm: regular  Rate: normal,         Neuro/Psych      Endo/Other    (+) diabetes mellitus (), obesity (BMI 32.23),      GI/Hepatic/Renal       Other findings: Obese, on oxygen, on two pressure stabilizing agents      Dental    (+) poor dentition                           Anesthesia Plan  Planned anesthetic: general endotracheal  Possible difficult airway  Two arterial lines, left and right  SUMI  Glidescope for intubation  ASA 5 - emergent   Induction: intravenous   Anesthetic plan and risks discussed with: patient and sibling  Anesthesia plan special considerations: video-assisted, increased risk of difficult airway, antiemetics, CVP line, arterial catheterization, pulmonary artery catheterization, IV therapy two IVs, dexmedetomidine  Post-op plan: extended intubation/vent support  DNR/DNI status   DNR/DNI status discussed with patient (FULL CODE).

## 2022-01-04 ENCOUNTER — LAB REQUISITION (OUTPATIENT)
Dept: LAB | Facility: CLINIC | Age: 52
End: 2022-01-04

## 2022-01-04 DIAGNOSIS — E11.51 TYPE 2 DIABETES MELLITUS WITH DIABETIC PERIPHERAL ANGIOPATHY WITHOUT GANGRENE (H): ICD-10-CM

## 2022-01-04 DIAGNOSIS — I10 ESSENTIAL (PRIMARY) HYPERTENSION: ICD-10-CM

## 2022-01-04 DIAGNOSIS — E78.2 MIXED HYPERLIPIDEMIA: ICD-10-CM

## 2022-01-04 LAB
ALBUMIN SERPL-MCNC: 3.9 G/DL (ref 3.5–5)
ALP SERPL-CCNC: 144 U/L (ref 45–120)
ALT SERPL W P-5'-P-CCNC: 17 U/L (ref 0–45)
ANION GAP SERPL CALCULATED.3IONS-SCNC: 14 MMOL/L (ref 5–18)
AST SERPL W P-5'-P-CCNC: 12 U/L (ref 0–40)
BILIRUB SERPL-MCNC: 0.9 MG/DL (ref 0–1)
BUN SERPL-MCNC: 14 MG/DL (ref 8–22)
CALCIUM SERPL-MCNC: 9.5 MG/DL (ref 8.5–10.5)
CHLORIDE BLD-SCNC: 107 MMOL/L (ref 98–107)
CHOLEST SERPL-MCNC: 262 MG/DL
CO2 SERPL-SCNC: 17 MMOL/L (ref 22–31)
CREAT SERPL-MCNC: 0.99 MG/DL (ref 0.7–1.3)
ERYTHROCYTE [DISTWIDTH] IN BLOOD BY AUTOMATED COUNT: 12.6 % (ref 10–15)
FASTING STATUS PATIENT QL REPORTED: ABNORMAL
GFR SERPL CREATININE-BSD FRML MDRD: >90 ML/MIN/1.73M2
GLUCOSE BLD-MCNC: 335 MG/DL (ref 70–125)
HCT VFR BLD AUTO: 51.5 % (ref 40–53)
HDLC SERPL-MCNC: 39 MG/DL
HGB BLD-MCNC: 16.2 G/DL (ref 13.3–17.7)
LDLC SERPL CALC-MCNC: 141 MG/DL
LDLC SERPL CALC-MCNC: ABNORMAL MG/DL
MCH RBC QN AUTO: 31.5 PG (ref 26.5–33)
MCHC RBC AUTO-ENTMCNC: 31.5 G/DL (ref 31.5–36.5)
MCV RBC AUTO: 100 FL (ref 78–100)
PLATELET # BLD AUTO: 179 10E3/UL (ref 150–450)
POTASSIUM BLD-SCNC: 4 MMOL/L (ref 3.5–5)
PROT SERPL-MCNC: 7.5 G/DL (ref 6–8)
RBC # BLD AUTO: 5.14 10E6/UL (ref 4.4–5.9)
SODIUM SERPL-SCNC: 138 MMOL/L (ref 136–145)
TRIGL SERPL-MCNC: 633 MG/DL
WBC # BLD AUTO: 9.1 10E3/UL (ref 4–11)

## 2022-01-04 PROCEDURE — 83721 ASSAY OF BLOOD LIPOPROTEIN: CPT | Performed by: PHYSICIAN ASSISTANT

## 2022-01-04 PROCEDURE — 80053 COMPREHEN METABOLIC PANEL: CPT | Performed by: PHYSICIAN ASSISTANT

## 2022-01-04 PROCEDURE — 85027 COMPLETE CBC AUTOMATED: CPT | Performed by: PHYSICIAN ASSISTANT

## 2022-01-04 PROCEDURE — 82040 ASSAY OF SERUM ALBUMIN: CPT | Performed by: PHYSICIAN ASSISTANT

## 2022-01-04 PROCEDURE — 80061 LIPID PANEL: CPT | Performed by: PHYSICIAN ASSISTANT

## 2022-04-05 ENCOUNTER — LAB REQUISITION (OUTPATIENT)
Dept: LAB | Facility: CLINIC | Age: 52
End: 2022-04-05

## 2022-04-05 DIAGNOSIS — E78.2 MIXED HYPERLIPIDEMIA: ICD-10-CM

## 2022-04-05 LAB
CHOLEST SERPL-MCNC: 238 MG/DL
FASTING STATUS PATIENT QL REPORTED: ABNORMAL
HDLC SERPL-MCNC: 36 MG/DL
LDLC SERPL CALC-MCNC: 134 MG/DL
TRIGL SERPL-MCNC: 338 MG/DL

## 2022-04-05 PROCEDURE — 80061 LIPID PANEL: CPT | Performed by: PHYSICIAN ASSISTANT

## 2022-08-25 ENCOUNTER — LAB REQUISITION (OUTPATIENT)
Dept: LAB | Facility: CLINIC | Age: 52
End: 2022-08-25

## 2022-08-25 DIAGNOSIS — E11.65 TYPE 2 DIABETES MELLITUS WITH HYPERGLYCEMIA (H): ICD-10-CM

## 2022-08-25 DIAGNOSIS — E78.2 MIXED HYPERLIPIDEMIA: ICD-10-CM

## 2022-08-25 LAB
ALBUMIN SERPL BCG-MCNC: 4.2 G/DL (ref 3.5–5.2)
ALP SERPL-CCNC: 143 U/L (ref 40–129)
ALT SERPL W P-5'-P-CCNC: 13 U/L (ref 10–50)
ANION GAP SERPL CALCULATED.3IONS-SCNC: 13 MMOL/L (ref 7–15)
AST SERPL W P-5'-P-CCNC: 15 U/L (ref 10–50)
BILIRUB SERPL-MCNC: 0.6 MG/DL
BUN SERPL-MCNC: 10.1 MG/DL (ref 6–20)
CALCIUM SERPL-MCNC: 9.5 MG/DL (ref 8.6–10)
CHLORIDE SERPL-SCNC: 100 MMOL/L (ref 98–107)
CHOLEST SERPL-MCNC: 212 MG/DL
CREAT SERPL-MCNC: 0.71 MG/DL (ref 0.67–1.17)
DEPRECATED HCO3 PLAS-SCNC: 24 MMOL/L (ref 22–29)
GFR SERPL CREATININE-BSD FRML MDRD: >90 ML/MIN/1.73M2
GLUCOSE SERPL-MCNC: 308 MG/DL (ref 70–99)
HDLC SERPL-MCNC: 34 MG/DL
LDLC SERPL CALC-MCNC: 127 MG/DL
NONHDLC SERPL-MCNC: 178 MG/DL
POTASSIUM SERPL-SCNC: 4.2 MMOL/L (ref 3.4–5.3)
PROT SERPL-MCNC: 7 G/DL (ref 6.4–8.3)
SODIUM SERPL-SCNC: 137 MMOL/L (ref 136–145)
TRIGL SERPL-MCNC: 257 MG/DL

## 2022-08-25 PROCEDURE — 82040 ASSAY OF SERUM ALBUMIN: CPT | Performed by: PHYSICIAN ASSISTANT

## 2022-08-25 PROCEDURE — 80061 LIPID PANEL: CPT | Performed by: PHYSICIAN ASSISTANT

## 2022-08-25 PROCEDURE — 80053 COMPREHEN METABOLIC PANEL: CPT | Performed by: PHYSICIAN ASSISTANT

## 2023-01-27 ENCOUNTER — APPOINTMENT (OUTPATIENT)
Dept: CT IMAGING | Facility: HOSPITAL | Age: 53
End: 2023-01-27
Attending: EMERGENCY MEDICINE
Payer: COMMERCIAL

## 2023-01-27 ENCOUNTER — APPOINTMENT (OUTPATIENT)
Dept: RADIOLOGY | Facility: HOSPITAL | Age: 53
End: 2023-01-27
Attending: EMERGENCY MEDICINE
Payer: COMMERCIAL

## 2023-01-27 ENCOUNTER — HOSPITAL ENCOUNTER (EMERGENCY)
Facility: HOSPITAL | Age: 53
Discharge: HOME | End: 2023-01-27
Attending: EMERGENCY MEDICINE | Admitting: EMERGENCY MEDICINE
Payer: COMMERCIAL

## 2023-01-27 ENCOUNTER — TELEPHONE (OUTPATIENT)
Dept: NEUROLOGY | Facility: CLINIC | Age: 53
End: 2023-01-27

## 2023-01-27 ENCOUNTER — DOCUMENTATION ONLY (OUTPATIENT)
Dept: ORTHOPEDICS | Facility: CLINIC | Age: 53
End: 2023-01-27

## 2023-01-27 VITALS
HEIGHT: 62 IN | OXYGEN SATURATION: 95 % | WEIGHT: 145 LBS | RESPIRATION RATE: 18 BRPM | SYSTOLIC BLOOD PRESSURE: 187 MMHG | TEMPERATURE: 97.8 F | DIASTOLIC BLOOD PRESSURE: 110 MMHG | HEART RATE: 100 BPM | BODY MASS INDEX: 26.68 KG/M2

## 2023-01-27 DIAGNOSIS — S32.010A COMPRESSION FRACTURE OF L1 VERTEBRA, INITIAL ENCOUNTER (H): Primary | ICD-10-CM

## 2023-01-27 DIAGNOSIS — S32.010A COMPRESSION FRACTURE OF L1 VERTEBRA, INITIAL ENCOUNTER (H): ICD-10-CM

## 2023-01-27 LAB
ALBUMIN UR-MCNC: 30 MG/DL
ANION GAP SERPL CALCULATED.3IONS-SCNC: 11 MMOL/L (ref 7–15)
APPEARANCE UR: CLEAR
APTT PPP: 23 SECONDS (ref 22–38)
BILIRUB UR QL STRIP: NEGATIVE
BUN SERPL-MCNC: 7.9 MG/DL (ref 6–20)
CALCIUM SERPL-MCNC: 9.4 MG/DL (ref 8.6–10)
CHLORIDE SERPL-SCNC: 103 MMOL/L (ref 98–107)
COLOR UR AUTO: COLORLESS
CREAT SERPL-MCNC: 0.66 MG/DL (ref 0.67–1.17)
DEPRECATED HCO3 PLAS-SCNC: 26 MMOL/L (ref 22–29)
ERYTHROCYTE [DISTWIDTH] IN BLOOD BY AUTOMATED COUNT: 12.2 % (ref 10–15)
GFR SERPL CREATININE-BSD FRML MDRD: >90 ML/MIN/1.73M2
GLUCOSE SERPL-MCNC: 186 MG/DL (ref 70–99)
GLUCOSE UR STRIP-MCNC: 70 MG/DL
HCT VFR BLD AUTO: 44.9 % (ref 40–53)
HGB BLD-MCNC: 15.2 G/DL (ref 13.3–17.7)
HGB UR QL STRIP: NEGATIVE
INR PPP: 0.97 (ref 0.85–1.15)
KETONES UR STRIP-MCNC: NEGATIVE MG/DL
LEUKOCYTE ESTERASE UR QL STRIP: NEGATIVE
MCH RBC QN AUTO: 31 PG (ref 26.5–33)
MCHC RBC AUTO-ENTMCNC: 33.9 G/DL (ref 31.5–36.5)
MCV RBC AUTO: 91 FL (ref 78–100)
MUCOUS THREADS #/AREA URNS LPF: PRESENT /LPF
NITRATE UR QL: NEGATIVE
NT-PROBNP SERPL-MCNC: 512 PG/ML (ref 0–900)
PH UR STRIP: 6.5 [PH] (ref 5–7)
PLATELET # BLD AUTO: 199 10E3/UL (ref 150–450)
POTASSIUM SERPL-SCNC: 3.4 MMOL/L (ref 3.4–5.3)
RBC # BLD AUTO: 4.91 10E6/UL (ref 4.4–5.9)
RBC URINE: 0 /HPF
SODIUM SERPL-SCNC: 140 MMOL/L (ref 136–145)
SP GR UR STRIP: >1.05 (ref 1–1.03)
SQUAMOUS EPITHELIAL: <1 /HPF
TROPONIN T SERPL HS-MCNC: 33 NG/L
TROPONIN T SERPL HS-MCNC: 35 NG/L
UROBILINOGEN UR STRIP-MCNC: <2 MG/DL
WBC # BLD AUTO: 9.4 10E3/UL (ref 4–11)
WBC URINE: <1 /HPF

## 2023-01-27 PROCEDURE — 84484 ASSAY OF TROPONIN QUANT: CPT | Performed by: EMERGENCY MEDICINE

## 2023-01-27 PROCEDURE — 250N000013 HC RX MED GY IP 250 OP 250 PS 637: Performed by: EMERGENCY MEDICINE

## 2023-01-27 PROCEDURE — 99285 EMERGENCY DEPT VISIT HI MDM: CPT | Mod: 25

## 2023-01-27 PROCEDURE — 85730 THROMBOPLASTIN TIME PARTIAL: CPT | Performed by: EMERGENCY MEDICINE

## 2023-01-27 PROCEDURE — 72131 CT LUMBAR SPINE W/O DYE: CPT

## 2023-01-27 PROCEDURE — 81001 URINALYSIS AUTO W/SCOPE: CPT | Performed by: EMERGENCY MEDICINE

## 2023-01-27 PROCEDURE — 83880 ASSAY OF NATRIURETIC PEPTIDE: CPT | Performed by: EMERGENCY MEDICINE

## 2023-01-27 PROCEDURE — 85027 COMPLETE CBC AUTOMATED: CPT | Performed by: EMERGENCY MEDICINE

## 2023-01-27 PROCEDURE — 80048 BASIC METABOLIC PNL TOTAL CA: CPT | Performed by: EMERGENCY MEDICINE

## 2023-01-27 PROCEDURE — 36415 COLL VENOUS BLD VENIPUNCTURE: CPT | Performed by: EMERGENCY MEDICINE

## 2023-01-27 PROCEDURE — 74174 CTA ABD&PLVS W/CONTRAST: CPT

## 2023-01-27 PROCEDURE — 85610 PROTHROMBIN TIME: CPT | Performed by: EMERGENCY MEDICINE

## 2023-01-27 PROCEDURE — 93005 ELECTROCARDIOGRAM TRACING: CPT | Performed by: EMERGENCY MEDICINE

## 2023-01-27 PROCEDURE — 72128 CT CHEST SPINE W/O DYE: CPT

## 2023-01-27 PROCEDURE — 250N000011 HC RX IP 250 OP 636: Performed by: EMERGENCY MEDICINE

## 2023-01-27 PROCEDURE — 72100 X-RAY EXAM L-S SPINE 2/3 VWS: CPT

## 2023-01-27 RX ORDER — CLONIDINE HYDROCHLORIDE 0.1 MG/1
0.1 TABLET ORAL ONCE
Status: COMPLETED | OUTPATIENT
Start: 2023-01-27 | End: 2023-01-27

## 2023-01-27 RX ORDER — HYDROCODONE BITARTRATE AND ACETAMINOPHEN 5; 325 MG/1; MG/1
1 TABLET ORAL ONCE
Status: COMPLETED | OUTPATIENT
Start: 2023-01-27 | End: 2023-01-27

## 2023-01-27 RX ORDER — CLONIDINE HYDROCHLORIDE 0.1 MG/1
0.1 TABLET ORAL ONCE
Status: DISCONTINUED | OUTPATIENT
Start: 2023-01-27 | End: 2023-01-27

## 2023-01-27 RX ORDER — IOPAMIDOL 755 MG/ML
100 INJECTION, SOLUTION INTRAVASCULAR ONCE
Status: COMPLETED | OUTPATIENT
Start: 2023-01-27 | End: 2023-01-27

## 2023-01-27 RX ORDER — HYDROCODONE BITARTRATE AND ACETAMINOPHEN 5; 325 MG/1; MG/1
1 TABLET ORAL EVERY 6 HOURS PRN
Qty: 10 TABLET | Refills: 0 | Status: SHIPPED | OUTPATIENT
Start: 2023-01-27 | End: 2023-01-30

## 2023-01-27 RX ADMIN — IOPAMIDOL 100 ML: 755 INJECTION, SOLUTION INTRAVENOUS at 13:20

## 2023-01-27 RX ADMIN — CLONIDINE HYDROCHLORIDE 0.1 MG: 0.1 TABLET ORAL at 17:09

## 2023-01-27 RX ADMIN — HYDROCODONE BITARTRATE AND ACETAMINOPHEN 1 TABLET: 5; 325 TABLET ORAL at 14:26

## 2023-01-27 ASSESSMENT — ACTIVITIES OF DAILY LIVING (ADL)
ADLS_ACUITY_SCORE: 33
ADLS_ACUITY_SCORE: 35
ADLS_ACUITY_SCORE: 35

## 2023-01-27 NOTE — PROGRESS NOTES
Discussed clinical exam and CT scan with Dr Stuart. Patient slipped and fell on the ice one week ago. Subacute L1 compression fracture noted on CT. Back pain is present. Able to ambulate. Offer off the shelf TLSO brace to be worn when upright and/or out of bed. Upright Xr prior to discharge. We will arrange follow up in clinic.     NEERAJ James  [facility] Neurosurgery  O: [phone]

## 2023-01-27 NOTE — DISCHARGE INSTRUCTIONS
WEARING THE LUMBAR BRACE:    * Wear the brace when out of bed or sitting upright in bed.  * You should apply the brace before standing.  * You may shower seated without brace.   Sit down on shower chair, remove brace   Shower   Dry   Re-apply brace before standing.   Take care not to fall  *If your brace is not fitting call [facility] [phone]  *Check  your skin under your brace at least daily.    Limit activity while healing a spine fracture. Avoid bending, twisting, lifting greater than 5-10 pounds.     Neurosurgery will call you to set up follow up in 4 weeks. Please call  if you have questions or do not hear from us.

## 2023-01-27 NOTE — TELEPHONE ENCOUNTER
L1 fracture  XR and ALBINA new patient appt in 4 weeks     NEERAJ James  [facility] Neurosurgery  O: [phone]

## 2023-01-27 NOTE — ED TRIAGE NOTES
Patient reports that he fell one week ago, slipped on ice- c/o chest/back pain.  Taking Tylenol- last night with minimal relief.  PT stated tathe is unable to sleep. Pt has not taken BP meds yet today

## 2023-01-27 NOTE — ED PROVIDER NOTES
EMERGENCY DEPARTMENT ENCOUNTER      NAME: Tiffany Her  AGE: 52 year old male  YOB: 1970  MRN: [id number]  EVALUATION DATE & TIME: 2023  1:01 PM    PCP: Denia Ames    ED PROVIDER: Aden Stuart D.O.      Chief Complaint   Patient presents with     Fall       FINAL IMPRESSION:  1. Compression fracture of L1 vertebra, initial encounter (H)        ED COURSE & MEDICAL DECISION MAKIN:23 PM I met with the patient to gather history and to perform my initial exam. I discussed the plan for care while in the Emergency Department.  2:29 PM I spoke with Dr. Rasmussen, neurosurgery, who recommended an off-the-shelf TLSO and an upright lumbar x-ray         Pertinent Labs & Imaging studies reviewed. (See chart for details)  52 year old male presents to the Emergency Department for evaluation of slip and fall on the ice today.  Patient had complained of back pain and sacral pain due to the fall.  Lab testing initially did show a questionable troponin, with repeat troponin that was actually downtrending, and I do not believe he has any significant cardiac etiology of the reason he fell or cardiac contusion from the fall.  There is no evidence of intracranial injury, or other injury other than an L1 fracture which would be fairly consistent with where he is describing his pain although slightly higher than what is initially on his exam.  He does report pain in the L1 area still.  Patient is able to ambulate, and I have consulted with neurosurgery who recommended that we place him in a brace and they will see him as an outpatient.  We did obtain a TLSO brace for him this evening.  Patient was comfortable with discharge.  Return precautions discussed.    Medical Decision Making    History:    Supplemental history from: Documented in chart, if applicable and N/A    External Record(s) reviewed: Documented in chart, if applicable.    Work Up:    Chart documentation includes differential considered and any EKGs  or imaging independently interpreted by provider, where specified.    In additional to work up documented, I considered the following work up: Documented in chart, if applicable.    External consultation:    Discussion of management with another provider: Documented in chart, if applicable and Neurosurgery    Complicating factors:    Care impacted by chronic illness: Diabetes, Hyperlipidemia and Hypertension    Care affected by social determinants of health: N/A    Disposition considerations: Discharge. I prescribed additional prescription strength medication(s) as charted. I considered admission, but ultimately discharged patient as the patient's pain was controlled.        At the conclusion of the encounter I discussed the results of all of the tests and the disposition. The questions were answered. The patient or family acknowledged understanding and was agreeable with the care plan.      HPI    Patient information was obtained from: Patient     Use of : Yes (Phone: Language Line) - Elizabeth Lenz is a 52 year old male with a pertinent medical history of hypertension, hyperlipidemia, aortic aneurysm, and type II diabetes who presents to this ED by private vehicle for evaluation of chest and back pain.     Patient reports persisting lower back pain located near his tailbone that radiates outwards for the past week after he had slipped on ice. He notes left lower quadrant abdominal pain. Patient denies head injury or loss of consciousness. Patient denies chest pain, shortness of breath, hip pain, or additional symptoms or complaints at this time.     Social History: Smoker, non-drinker    REVIEW OF SYSTEMS  Constitutional:  Denies fever, chills, weight loss or weakness  Eyes:  No pain, discharge, redness  HENT:  Denies sore throat, ear pain, congestion  Respiratory: No SOB, wheeze or cough  Cardiovascular:  No CP, palpitations  GI:  Denies nausea, vomiting, diarrhea. Positive for abdominal pain  (LLQ)  : Denies dysuria, hematuria  Musculoskeletal:  Denies any new muscle/joint pain, swelling or loss of function. Positive for back pain (lower)  Skin:  Denies rash, pallor  Neurologic:  Denies headache, focal weakness or sensory changes  Lymph: Denies swollen nodes    All other systems negative unless noted in HPI.    PAST MEDICAL HISTORY:  Past Medical History:   Diagnosis Date     Aortic dissection (H)      Depression      Diabetes mellitus type 2 in nonobese (H)      HLD (hyperlipidemia)      HTN (hypertension)      Pneumonia      Respiratory failure (H)        PAST SURGICAL HISTORY:  Past Surgical History:   Procedure Laterality Date     ENDOVASCULAR REPAIR ANEURYSM ABDOMINAL AORTA N/A 2/13/2018    Procedure: ENDOVASCULAR REPAIR ANEURYSM ABDOMINAL AORTA;  thoracic endovascular repair of aorta distal to left subclavian artery.  Intravascular ultrasouns of aortic arch, thoracic aorta, and abdominal aorta.  ultrasound guided vascular accessx2, bilateral lower extremity arterial duplex limited study, percutaneous closure of endovascular access x1.  aorta catheterization x1.;  Surgeon: Renata Monique         CURRENT MEDICATIONS:    No current facility-administered medications for this encounter.     Current Outpatient Medications   Medication     HYDROcodone-acetaminophen (NORCO) 5-325 MG tablet     AMLODIPINE BESYLATE PO     diltiazem 240 MG 24 hr capsule     diphenhydrAMINE (BENADRYL) 50 MG capsule     FLUOXETINE HCL PO     HYDROCHLOROTHIAZIDE PO     hydrocortisone 0.5 % cream     LABETALOL HCL PO     lisinopril (PRINIVIL/ZESTRIL) 10 MG tablet     metFORMIN (GLUCOPHAGE-XR) 500 MG 24 hr tablet         ALLERGIES:  No Known Allergies    FAMILY HISTORY:  History reviewed. No pertinent family history.    SOCIAL HISTORY:  Social History     Socioeconomic History     Marital status: Single   Tobacco Use     Smoking status: Some Days     Smokeless tobacco: Never   Substance and Sexual Activity     Alcohol use: No      "Drug use: No       VITALS:  Patient Vitals for the past 24 hrs:   BP Temp Temp src Pulse Resp SpO2 Height Weight   01/27/23 1825 (!) 187/110 -- -- 100 18 95 % -- --   01/27/23 1709 (!) 188/112 -- -- 93 -- 95 % -- --   01/27/23 1530 (!) 179/101 -- -- 83 18 96 % -- --   01/27/23 1445 (!) 179/113 -- -- 92 -- 96 % -- --   01/27/23 1415 (!) 190/119 -- -- 90 -- 96 % -- --   01/27/23 1400 (!) 191/110 -- -- 89 -- 97 % -- --   01/27/23 1345 (!) 193/113 -- -- 88 -- 94 % -- --   01/27/23 1330 (!) 198/112 -- -- 91 -- 98 % -- --   01/27/23 1320 (!) 210/111 -- -- 92 -- 97 % -- --   01/27/23 1251 (!) 176/106 -- -- 91 18 97 % -- --   01/27/23 1057 -- -- -- -- -- -- -- 65.8 kg (145 lb)   01/27/23 1054 (!) 221/127 97.8  F (36.6  C) Temporal 101 20 98 % 1.575 m (5' 2\") (!) 6.804 kg (15 lb)       PHYSICAL EXAM    VITAL SIGNS: BP (!) 187/110   Pulse 100   Temp 97.8  F (36.6  C) (Temporal)   Resp 18   Ht 1.575 m (5' 2\")   Wt 65.8 kg (145 lb)   SpO2 95%   BMI 26.52 kg/m      General Appearance: Well-appearing, well-nourished, no acute distress   Head:  Normocephalic, without obvious abnormality, atraumatic  Eyes:  PERRL, conjunctiva/corneas clear, EOM's intact,  ENT:  Lips, mucosa, and tongue normal, membranes are moist without pallor  Neck:  Normal ROM, symmetrical, trachea midline    Chest:  No tenderness or deformity, no crepitus  Cardio:  Regular rate and rhythm, no murmur, rub or gallop, 2+ pulses symmetric in all extremities  Pulm:  Clear to auscultation bilaterally, respirations unlabored,  Back:  ROM normal, no CVA tenderness, tenderness with palpation from the L4 to mid-sacrum  Abdomen:  Soft, tenderness with palpation to left lower quadrant, no rebound or guarding.  Musculoskeletal: Full ROM, no edema, no cyanosis, good ROM of major joints  Integument:  Warm, Dry, No erythema, No rash.    Neurologic:  Alert & oriented.  No focal deficits appreciated.  Ambulatory.  Psychiatric:  Affect normal, Judgment normal, Mood normal. "      LABS  Results for orders placed or performed during the hospital encounter of 01/27/23 (from the past 24 hour(s))   CBC (+ platelets, no diff)   Result Value Ref Range    WBC Count 9.4 4.0 - 11.0 10e3/uL    RBC Count 4.91 4.40 - 5.90 10e6/uL    Hemoglobin 15.2 13.3 - 17.7 g/dL    Hematocrit 44.9 40.0 - 53.0 %    MCV 91 78 - 100 fL    MCH 31.0 26.5 - 33.0 pg    MCHC 33.9 31.5 - 36.5 g/dL    RDW 12.2 10.0 - 15.0 %    Platelet Count 199 150 - 450 10e3/uL   Basic metabolic panel   Result Value Ref Range    Sodium 140 136 - 145 mmol/L    Potassium 3.4 3.4 - 5.3 mmol/L    Chloride 103 98 - 107 mmol/L    Carbon Dioxide (CO2) 26 22 - 29 mmol/L    Anion Gap 11 7 - 15 mmol/L    Urea Nitrogen 7.9 6.0 - 20.0 mg/dL    Creatinine 0.66 (L) 0.67 - 1.17 mg/dL    Calcium 9.4 8.6 - 10.0 mg/dL    Glucose 186 (H) 70 - 99 mg/dL    GFR Estimate >90 >60 mL/min/1.73m2   Troponin T, High Sensitivity (now)   Result Value Ref Range    Troponin T, High Sensitivity 35 (H) <=22 ng/L   Nt probnp inpatient   Result Value Ref Range    N terminal Pro BNP Inpatient 512 0 - 900 pg/mL   INR   Result Value Ref Range    INR 0.97 0.85 - 1.15   PTT   Result Value Ref Range    aPTT 23 22 - 38 Seconds   CT Thoracic Spine w/o Contrast    Narrative    EXAM: CT THORACIC SPINE W/O CONTRAST, CT LUMBAR SPINE W/O CONTRAST  LOCATION: [facility]  DATE/TIME: 1/27/2023 1:18 PM    INDICATION: back pain after fall  COMPARISON: Lumbar spine radiograph 08/10/2020  TECHNIQUE:  1) Routine CT Thoracic Spine without IV contrast. Multiplanar reformats. Dose reduction techniques were used.   2) Routine CT Lumbar Spine without IV contrast. Multiplanar reformats. Dose reduction techniques were used.     FINDINGS:    THORACIC SPINE CT:  VERTEBRA: Trace thoracic levocurvature. AP alignment preserved. Normal vertebral body heights. No fracture or posttraumatic subluxation.     CANAL/FORAMINA: No canal or neural foraminal stenosis.    PARASPINAL: Please  refer to separately dictated CTA chest abdomen pelvis for intrathoracic findings including aortic aneurysm.    LUMBAR SPINE CT:  VERTEBRA: There is transitional lumbosacral anatomy with rudimentary ribs at L1. New from prior is age indeterminate but likely acute superior endplate compression fracture at L1. There is 20% height loss with one to 2 mm retropulsion. No associated   spinal canal or foraminal stenosis. Remaining lumbar vertebral body heights are maintained. Posterior elements are intact.     CANAL/FORAMINA: No canal or neural foraminal stenosis.    PARASPINAL: Please refer to separately dictated CTA chest abdomen and pelvis for intra-abdominal findings including aortic aneurysm.      Impression    IMPRESSION:  THORACIC SPINE CT:  1.  No fracture or posttraumatic subluxation.  2.  No high-grade spinal canal or neural foraminal stenosis.    LUMBAR SPINE CT:  1.  Transitional lumbosacral anatomy as described above.  2.  Age-indeterminate but likely acute L1 superior endplate compression fracture.     Lumbar spine CT w/o contrast    Narrative    EXAM: CT THORACIC SPINE W/O CONTRAST, CT LUMBAR SPINE W/O CONTRAST  LOCATION: [facility]  DATE/TIME: 1/27/2023 1:18 PM    INDICATION: back pain after fall  COMPARISON: Lumbar spine radiograph 08/10/2020  TECHNIQUE:  1) Routine CT Thoracic Spine without IV contrast. Multiplanar reformats. Dose reduction techniques were used.   2) Routine CT Lumbar Spine without IV contrast. Multiplanar reformats. Dose reduction techniques were used.     FINDINGS:    THORACIC SPINE CT:  VERTEBRA: Trace thoracic levocurvature. AP alignment preserved. Normal vertebral body heights. No fracture or posttraumatic subluxation.     CANAL/FORAMINA: No canal or neural foraminal stenosis.    PARASPINAL: Please refer to separately dictated CTA chest abdomen pelvis for intrathoracic findings including aortic aneurysm.    LUMBAR SPINE CT:  VERTEBRA: There is transitional  lumbosacral anatomy with rudimentary ribs at L1. New from prior is age indeterminate but likely acute superior endplate compression fracture at L1. There is 20% height loss with one to 2 mm retropulsion. No associated   spinal canal or foraminal stenosis. Remaining lumbar vertebral body heights are maintained. Posterior elements are intact.     CANAL/FORAMINA: No canal or neural foraminal stenosis.    PARASPINAL: Please refer to separately dictated CTA chest abdomen and pelvis for intra-abdominal findings including aortic aneurysm.      Impression    IMPRESSION:  THORACIC SPINE CT:  1.  No fracture or posttraumatic subluxation.  2.  No high-grade spinal canal or neural foraminal stenosis.    LUMBAR SPINE CT:  1.  Transitional lumbosacral anatomy as described above.  2.  Age-indeterminate but likely acute L1 superior endplate compression fracture.     CTA Chest Abdomen Pelvis w Contrast    Narrative    EXAM: CTA CHEST ABDOMEN PELVIS W CONTRAST  LOCATION: [facility]  DATE/TIME: 1/27/2023 1:20 PM    INDICATION: Chest pain. Back pain.  COMPARISON: CT chest 2/12/2018. CT chest, abdomen, and pelvis 02/03/2018.  TECHNIQUE: CT angiogram chest abdomen pelvis during arterial phase of injection of IV contrast. 2D and 3D MIP reconstructions were performed by the CT technologist. Dose reduction techniques were used.   CONTRAST: 100ML isovue 370    FINDINGS:   CT ANGIOGRAM CHEST, ABDOMEN, AND PELVIS: Moderate to severe mixed calcified and noncalcified atherosclerotic plaque throughout the aorta and iliac arteries. Patent descending thoracic aortic endograft. No evidence of acute aortic dissection or other   acute aortic abnormality. Great vessels at the aortic arch are patent. Fusiform ectasia of the descending thoracic aorta at the hiatus measuring up to 3.4 cm. Celiac artery and its branches are patent. Superior mesenteric artery and its branches are   patent. Bilateral single renal arteries are patent.  Fusiform infrarenal abdominal aortic aneurysm measuring up to 5.4 cm containing crescentic mural plaque, previously 3.9 cm. Bilateral common iliac arteries are patent. Moderate stenosis at the origins   of the bilateral internal iliac arteries. Mild stenosis at the origin of the left external iliac artery. Right external iliac artery and bilateral common femoral arteries are patent.    LUNGS AND PLEURA: No consolidations or pleural effusions. Atelectasis of the left medial lower lobe adjacent to the endograft. Small areas of air trapping in the left lower lobe. 2 mm nodule in the right middle lobe is unchanged (4/#148).    MEDIASTINUM/AXILLAE: Mild cardiomegaly. Trace physiologic pericardial effusion. Central pulmonary arteries are patent. No enlarged thoracic lymph node.    CORONARY ARTERY CALCIFICATION: Mild to moderate.    HEPATOBILIARY: Normal.    PANCREAS: Normal.    SPLEEN: Normal.    ADRENAL GLANDS: Normal.    KIDNEYS/BLADDER: Small bilateral benign renal cysts, which do not require follow-up. Trace excreted contrast within both collecting systems. No hydronephrosis. Normal urinary bladder.    BOWEL: No obstruction or inflammation. Normal appendix.    LYMPH NODES: No enlarged lymph node.    PELVIC ORGANS: Normal.    MUSCULOSKELETAL: Small right iliac bone island is unchanged. Mild compression deformity of the L1 vertebral body with approximately 25% height loss, new since 2018. Mild degenerative changes of the spine.      Impression    IMPRESSION:    1.  No evidence of acute aortic abnormality. Patent descending thoracic aortic endograft.    2.  Fusiform infrarenal abdominal aortic aneurysm has increased in size since 2018, now measuring up to 5.4 cm. Follow-up guidelines below.    3.  Likely acute/subacute mild compression deformity of the L1 vertebral body. No other evidence of acute traumatic injury in the chest, abdomen, or pelvis.    4.  Mild cardiomegaly.    REFERENCE:  SVS practice guidelines on the  care of patients with an abdominal aortic aneurysm. Kayley MAIER. J Vasc Surg, 2018. PMID: 04334888.    Aorta size: 5.0 cm to 5.4 cm: Surveillance imaging at 6-month intervals.   Troponin T, High Sensitivity   Result Value Ref Range    Troponin T, High Sensitivity 33 (H) <=22 ng/L   UA with Microscopic reflex to Culture    Specimen: Urine, NOS   Result Value Ref Range    Color Urine Colorless Colorless, Straw, Light Yellow, Yellow    Appearance Urine Clear Clear    Glucose Urine 70 (A) Negative mg/dL    Bilirubin Urine Negative Negative    Ketones Urine Negative Negative mg/dL    Specific Gravity Urine >1.050 (H) 1.001 - 1.030    Blood Urine Negative Negative    pH Urine 6.5 5.0 - 7.0    Protein Albumin Urine 30 (A) Negative mg/dL    Urobilinogen Urine <2.0 <2.0 mg/dL    Nitrite Urine Negative Negative    Leukocyte Esterase Urine Negative Negative    Mucus Urine Present (A) None Seen /LPF    RBC Urine 0 <=2 /HPF    WBC Urine <1 <=5 /HPF    Squamous Epithelials Urine <1 <=1 /HPF    Narrative    Urine Culture not indicated   XR Lumbar Spine 2/3 Views    Narrative    EXAM: XR LUMBAR SPINE 2/3 VIEWS  LOCATION: [facility]  DATE/TIME: 1/27/2023 3:02 PM    INDICATION: Need upright film for L1 compression fracture  COMPARISON: CT lumbar spine 01/27/2023      Impression    IMPRESSION: L1 superior endplate compression fracture is again noted. Height loss appears slightly more prominent than prior CT, likely secondary to positioning. Remaining vertebral body heights are maintained. Normal alignment. Mild disc height loss.   Aortic atherosclerosis.         RADIOLOGY  XR Lumbar Spine 2/3 Views   Final Result   IMPRESSION: L1 superior endplate compression fracture is again noted. Height loss appears slightly more prominent than prior CT, likely secondary to positioning. Remaining vertebral body heights are maintained. Normal alignment. Mild disc height loss.    Aortic atherosclerosis.      CTA Chest Abdomen  Pelvis w Contrast   Final Result   IMPRESSION:      1.  No evidence of acute aortic abnormality. Patent descending thoracic aortic endograft.      2.  Fusiform infrarenal abdominal aortic aneurysm has increased in size since 2018, now measuring up to 5.4 cm. Follow-up guidelines below.      3.  Likely acute/subacute mild compression deformity of the L1 vertebral body. No other evidence of acute traumatic injury in the chest, abdomen, or pelvis.      4.  Mild cardiomegaly.      REFERENCE:   SVS practice guidelines on the care of patients with an abdominal aortic aneurysm. Kayley MAIER. J Vasc Surg, 2018. PMID: [id number].      Aorta size: 5.0 cm to 5.4 cm: Surveillance imaging at 6-month intervals.      Lumbar spine CT w/o contrast   Final Result   IMPRESSION:   THORACIC SPINE CT:   1.  No fracture or posttraumatic subluxation.   2.  No high-grade spinal canal or neural foraminal stenosis.      LUMBAR SPINE CT:   1.  Transitional lumbosacral anatomy as described above.   2.  Age-indeterminate but likely acute L1 superior endplate compression fracture.         CT Thoracic Spine w/o Contrast   Final Result   IMPRESSION:   THORACIC SPINE CT:   1.  No fracture or posttraumatic subluxation.   2.  No high-grade spinal canal or neural foraminal stenosis.      LUMBAR SPINE CT:   1.  Transitional lumbosacral anatomy as described above.   2.  Age-indeterminate but likely acute L1 superior endplate compression fracture.                EKG:    Rate: 97bpm  Rhythm: Normal Sinus Rhythm  Axis: Normal  Interval: Normal  Conduction: Normal  QRS: Normal  ST: Normal  T-wave: Inverted V5, V6  QT: Not prolonged  Comparison EKG: ST segments have improved and T-wave inversions are new compared to 10 Feb 2018  Impression:  No acute ischemic change   I have independently reviewed and interpreted today's EKG, pending Cardiologist read          MEDICATIONS GIVEN IN THE EMERGENCY:  Medications   iopamidol (ISOVUE-370) solution 100 mL (100 mLs  Intravenous Given 1/27/23 1320)   HYDROcodone-acetaminophen (NORCO) 5-325 MG per tablet 1 tablet (1 tablet Oral Given 1/27/23 1426)   cloNIDine (CATAPRES) tablet 0.1 mg (0.1 mg Oral Given 1/27/23 1709)       NEW PRESCRIPTIONS STARTED AT TODAY'S ER VISIT  Discharge Medication List as of 1/27/2023  6:25 PM      START taking these medications    Details   HYDROcodone-acetaminophen (NORCO) 5-325 MG tablet Take 1 tablet by mouth every 6 hours as needed for severe pain (7-10), Disp-10 tablet, R-0, Local Print              I, Dillon Wolff, am serving as a scribe to document services personally performed by Dr. Stuart, based on my observations and the provider's statements to me.  I, JOANA Johnson, attest that Dillon Wolff is acting in a scribe capacity, has observed my performance of the services and has documented them in accordance with my direction.     Aden Stuart D.O.  Emergency Medicine  [facility] EMERGENCY DEPARTMENT  [address] [place] [id number]  [phone]  Dept: [phone]     Aden Stuart DO  01/27/23 2016

## 2023-01-27 NOTE — ED NOTES
"ED Provider In Triage Note  [facility]  Encounter Date: Jan 27, 2023    No chief complaint on file.      Brief HPI:   Tiffany Lenz is a 52 year old male presenting to the Emergency Department with a chief complaint of chest and back pain x one week that started after a slip on the ice. Pain is located to BL flanks and spine. Has associated SOB. Denies head injury.      Brief Physical Exam:  BP (!) 221/127   Pulse 101   Temp 97.8  F (36.6  C) (Temporal)   Resp 20   Ht 1.575 m (5' 2\")   Wt (!) 6.804 kg (15 lb)   SpO2 98%   BMI 2.74 kg/m    General: Non-toxic appearing  HEENT: Atraumatic  Resp: No respiratory distress; symmetrical and clear breath sounds  Abdomen: Non-peritoneal; no CVAT, BL  BACK: atraumatic; no midline tenderness thoracic and lumbar spines  Neuro: Alert, oriented, answers questions appropriately  Psych: Behavior appropriate      Plan Initiated in Triage:  EKG  CTA chest / abd / pelvis  CT thoracic and lumbar spines  CBC, BMP, BNP, troponin, coags      PIT Dispo:   Return to lobby while awaiting workup and ED bed availability    Miriam Albarran MD on 1/27/2023 at 10:57 AM    Patient was evaluated by the Physician in Triage due to a limitation of available rooms in the Emergency Department. A plan of care was discussed based on the information obtained on the initial evaluation and patient was consuled to return back to the Emergency Department lobby after this initial evalutaiton until results were obtained or a room became available in the Emergency Department. Patient was counseled not to leave prior to receiving the results of their workup.     Miriam Albarran MD  [facility] EMERGENCY DEPARTMENT  [facility] [facility] [id number]  [phone]       Miriam Albarran MD  01/27/23 1104    "

## 2023-01-28 NOTE — PROGRESS NOTES
01/27/23 encounter  S: Pt seen at [facility] ED room H-D for the fitting and delivery of a TLSO. Orders from Dr. Aden Stuart. Diagnosis is fracture of L1.  O: Pt was waiting in the hospital for discharge after being fit with the brace   A: Pt was fit with the Horizon 456. Side panels were adjusted to size 3 for his waist circumference of 39 inches, and the height of the proximal harness was lowered for his short stature. Fit was good and he was very happy with the fit and function. He was given verbal and written information for the donning, doffing, care and warranty of the brace.   P: Follow up as needed.   G: Goal was met at today's fitting of stabilizing his back at the L1 level, His comfort was increased with the brace on.

## 2023-06-03 ENCOUNTER — APPOINTMENT (OUTPATIENT)
Dept: MRI IMAGING | Facility: HOSPITAL | Age: 53
End: 2023-06-03
Attending: EMERGENCY MEDICINE
Payer: COMMERCIAL

## 2023-06-03 ENCOUNTER — APPOINTMENT (OUTPATIENT)
Dept: ULTRASOUND IMAGING | Facility: HOSPITAL | Age: 53
End: 2023-06-03
Attending: PSYCHIATRY & NEUROLOGY
Payer: COMMERCIAL

## 2023-06-03 ENCOUNTER — HOSPITAL ENCOUNTER (INPATIENT)
Facility: CLINIC | Age: 53
LOS: 2 days | Discharge: HOME | End: 2023-06-05
Attending: EMERGENCY MEDICINE | Admitting: PSYCHIATRY & NEUROLOGY
Payer: COMMERCIAL

## 2023-06-03 ENCOUNTER — HOSPITAL ENCOUNTER (EMERGENCY)
Facility: HOSPITAL | Age: 53
Discharge: ANOTHER HOSPITAL | End: 2023-06-03
Attending: EMERGENCY MEDICINE | Admitting: EMERGENCY MEDICINE
Payer: COMMERCIAL

## 2023-06-03 ENCOUNTER — APPOINTMENT (OUTPATIENT)
Dept: CT IMAGING | Facility: HOSPITAL | Age: 53
End: 2023-06-03
Attending: EMERGENCY MEDICINE
Payer: COMMERCIAL

## 2023-06-03 VITALS
HEART RATE: 105 BPM | RESPIRATION RATE: 18 BRPM | SYSTOLIC BLOOD PRESSURE: 223 MMHG | DIASTOLIC BLOOD PRESSURE: 147 MMHG | OXYGEN SATURATION: 98 % | TEMPERATURE: 98.4 F

## 2023-06-03 DIAGNOSIS — I63.9 CEREBROVASCULAR ACCIDENT (CVA), UNSPECIFIED MECHANISM (H): ICD-10-CM

## 2023-06-03 DIAGNOSIS — I63.511 ARTERIAL ISCHEMIC STROKE, MCA (MIDDLE CEREBRAL ARTERY), RIGHT, ACUTE (H): ICD-10-CM

## 2023-06-03 DIAGNOSIS — I10 ESSENTIAL HYPERTENSION, MALIGNANT: ICD-10-CM

## 2023-06-03 DIAGNOSIS — Z11.52 ENCOUNTER FOR SCREENING LABORATORY TESTING FOR SEVERE ACUTE RESPIRATORY SYNDROME CORONAVIRUS 2 (SARS-COV-2): ICD-10-CM

## 2023-06-03 DIAGNOSIS — R29.702 NATIONAL INSTITUTES OF HEALTH STROKE SCALE (NIHSS) TOTAL SCORE 2: ICD-10-CM

## 2023-06-03 LAB
ABO/RH(D): NORMAL
ANION GAP SERPL CALCULATED.3IONS-SCNC: 11 MMOL/L (ref 7–15)
ANTIBODY SCREEN: NEGATIVE
BASOPHILS # BLD AUTO: 0 10E3/UL (ref 0–0.2)
BASOPHILS NFR BLD AUTO: 1 %
BUN SERPL-MCNC: 13.5 MG/DL (ref 6–20)
CALCIUM SERPL-MCNC: 9.4 MG/DL (ref 8.6–10)
CHLORIDE SERPL-SCNC: 102 MMOL/L (ref 98–107)
CHOLEST SERPL-MCNC: 185 MG/DL
CREAT SERPL-MCNC: 0.89 MG/DL (ref 0.67–1.17)
DEPRECATED HCO3 PLAS-SCNC: 23 MMOL/L (ref 22–29)
EOSINOPHIL # BLD AUTO: 0.2 10E3/UL (ref 0–0.7)
EOSINOPHIL NFR BLD AUTO: 2 %
ERYTHROCYTE [DISTWIDTH] IN BLOOD BY AUTOMATED COUNT: 13.2 % (ref 10–15)
ETHANOL SERPL-MCNC: <0.01 G/DL
GFR SERPL CREATININE-BSD FRML MDRD: >90 ML/MIN/1.73M2
GLUCOSE BLDC GLUCOMTR-MCNC: 137 MG/DL (ref 70–99)
GLUCOSE SERPL-MCNC: 280 MG/DL (ref 70–99)
HCT VFR BLD AUTO: 38.4 % (ref 40–53)
HDLC SERPL-MCNC: 29 MG/DL
HGB BLD-MCNC: 13.3 G/DL (ref 13.3–17.7)
IMM GRANULOCYTES # BLD: 0 10E3/UL
IMM GRANULOCYTES NFR BLD: 0 %
LDLC SERPL CALC-MCNC: 112 MG/DL
LYMPHOCYTES # BLD AUTO: 2 10E3/UL (ref 0.8–5.3)
LYMPHOCYTES NFR BLD AUTO: 24 %
MAGNESIUM SERPL-MCNC: 2.1 MG/DL (ref 1.7–2.3)
MCH RBC QN AUTO: 31.5 PG (ref 26.5–33)
MCHC RBC AUTO-ENTMCNC: 34.6 G/DL (ref 31.5–36.5)
MCV RBC AUTO: 91 FL (ref 78–100)
MONOCYTES # BLD AUTO: 0.6 10E3/UL (ref 0–1.3)
MONOCYTES NFR BLD AUTO: 7 %
NEUTROPHILS # BLD AUTO: 5.7 10E3/UL (ref 1.6–8.3)
NEUTROPHILS NFR BLD AUTO: 66 %
NONHDLC SERPL-MCNC: 156 MG/DL
NRBC # BLD AUTO: 0 10E3/UL
NRBC BLD AUTO-RTO: 0 /100
PLATELET # BLD AUTO: 244 10E3/UL (ref 150–450)
POTASSIUM SERPL-SCNC: 4.1 MMOL/L (ref 3.4–5.3)
RBC # BLD AUTO: 4.22 10E6/UL (ref 4.4–5.9)
SODIUM SERPL-SCNC: 136 MMOL/L (ref 136–145)
SPECIMEN EXPIRATION DATE: NORMAL
TRIGL SERPL-MCNC: 219 MG/DL
TROPONIN T SERPL HS-MCNC: 51 NG/L
TSH SERPL DL<=0.005 MIU/L-ACNC: 1.83 UIU/ML (ref 0.3–4.2)
WBC # BLD AUTO: 8.5 10E3/UL (ref 4–11)

## 2023-06-03 PROCEDURE — 99291 CRITICAL CARE FIRST HOUR: CPT | Mod: 25

## 2023-06-03 PROCEDURE — A9585 GADOBUTROL INJECTION: HCPCS | Performed by: EMERGENCY MEDICINE

## 2023-06-03 PROCEDURE — 70553 MRI BRAIN STEM W/O & W/DYE: CPT

## 2023-06-03 PROCEDURE — 250N000013 HC RX MED GY IP 250 OP 250 PS 637: Performed by: EMERGENCY MEDICINE

## 2023-06-03 PROCEDURE — 84443 ASSAY THYROID STIM HORMONE: CPT | Performed by: EMERGENCY MEDICINE

## 2023-06-03 PROCEDURE — 80061 LIPID PANEL: CPT | Performed by: STUDENT IN AN ORGANIZED HEALTH CARE EDUCATION/TRAINING PROGRAM

## 2023-06-03 PROCEDURE — 36415 COLL VENOUS BLD VENIPUNCTURE: CPT | Performed by: EMERGENCY MEDICINE

## 2023-06-03 PROCEDURE — 86901 BLOOD TYPING SEROLOGIC RH(D): CPT | Performed by: EMERGENCY MEDICINE

## 2023-06-03 PROCEDURE — 93880 EXTRACRANIAL BILAT STUDY: CPT

## 2023-06-03 PROCEDURE — 93005 ELECTROCARDIOGRAM TRACING: CPT | Performed by: EMERGENCY MEDICINE

## 2023-06-03 PROCEDURE — 250N000011 HC RX IP 250 OP 636: Performed by: EMERGENCY MEDICINE

## 2023-06-03 PROCEDURE — 99285 EMERGENCY DEPT VISIT HI MDM: CPT | Mod: 25 | Performed by: EMERGENCY MEDICINE

## 2023-06-03 PROCEDURE — 85025 COMPLETE CBC W/AUTO DIFF WBC: CPT | Performed by: EMERGENCY MEDICINE

## 2023-06-03 PROCEDURE — 82962 GLUCOSE BLOOD TEST: CPT

## 2023-06-03 PROCEDURE — 93010 ELECTROCARDIOGRAM REPORT: CPT | Performed by: EMERGENCY MEDICINE

## 2023-06-03 PROCEDURE — 70544 MR ANGIOGRAPHY HEAD W/O DYE: CPT

## 2023-06-03 PROCEDURE — 255N000002 HC RX 255 OP 636: Performed by: EMERGENCY MEDICINE

## 2023-06-03 PROCEDURE — 70549 MR ANGIOGRAPH NECK W/O&W/DYE: CPT

## 2023-06-03 PROCEDURE — 82077 ASSAY SPEC XCP UR&BREATH IA: CPT | Performed by: EMERGENCY MEDICINE

## 2023-06-03 PROCEDURE — 120N000001 HC R&B MED SURG/OB

## 2023-06-03 PROCEDURE — 83735 ASSAY OF MAGNESIUM: CPT | Performed by: EMERGENCY MEDICINE

## 2023-06-03 PROCEDURE — 70498 CT ANGIOGRAPHY NECK: CPT

## 2023-06-03 PROCEDURE — C9803 HOPD COVID-19 SPEC COLLECT: HCPCS | Performed by: EMERGENCY MEDICINE

## 2023-06-03 PROCEDURE — 86850 RBC ANTIBODY SCREEN: CPT | Performed by: EMERGENCY MEDICINE

## 2023-06-03 PROCEDURE — 82310 ASSAY OF CALCIUM: CPT | Performed by: EMERGENCY MEDICINE

## 2023-06-03 PROCEDURE — 200N000002 HC R&B ICU UMMC

## 2023-06-03 PROCEDURE — 84484 ASSAY OF TROPONIN QUANT: CPT | Performed by: EMERGENCY MEDICINE

## 2023-06-03 PROCEDURE — 70496 CT ANGIOGRAPHY HEAD: CPT

## 2023-06-03 PROCEDURE — 99223 1ST HOSP IP/OBS HIGH 75: CPT | Performed by: PSYCHIATRY & NEUROLOGY

## 2023-06-03 RX ORDER — CLOPIDOGREL BISULFATE 75 MG/1
300 TABLET ORAL ONCE
Status: COMPLETED | OUTPATIENT
Start: 2023-06-03 | End: 2023-06-03

## 2023-06-03 RX ORDER — ASPIRIN 325 MG
325 TABLET ORAL DAILY
Status: DISCONTINUED | OUTPATIENT
Start: 2023-06-04 | End: 2023-06-05 | Stop reason: HOSPADM

## 2023-06-03 RX ORDER — ASPIRIN 325 MG
325 TABLET ORAL ONCE
Status: COMPLETED | OUTPATIENT
Start: 2023-06-03 | End: 2023-06-03

## 2023-06-03 RX ORDER — LISINOPRIL 40 MG/1
40 TABLET ORAL DAILY
COMMUNITY
Start: 2023-05-01 | End: 2023-06-19

## 2023-06-03 RX ORDER — LABETALOL HYDROCHLORIDE 5 MG/ML
10-20 INJECTION, SOLUTION INTRAVENOUS EVERY 10 MIN PRN
Status: DISCONTINUED | OUTPATIENT
Start: 2023-06-03 | End: 2023-06-05 | Stop reason: HOSPADM

## 2023-06-03 RX ORDER — IOPAMIDOL 755 MG/ML
75 INJECTION, SOLUTION INTRAVASCULAR ONCE
Status: COMPLETED | OUTPATIENT
Start: 2023-06-03 | End: 2023-06-03

## 2023-06-03 RX ORDER — HYDRALAZINE HYDROCHLORIDE 20 MG/ML
10-20 INJECTION INTRAMUSCULAR; INTRAVENOUS
Status: DISCONTINUED | OUTPATIENT
Start: 2023-06-03 | End: 2023-06-05 | Stop reason: HOSPADM

## 2023-06-03 RX ORDER — ATORVASTATIN CALCIUM 80 MG/1
80 TABLET, FILM COATED ORAL EVERY EVENING
Status: DISCONTINUED | OUTPATIENT
Start: 2023-06-03 | End: 2023-06-05 | Stop reason: HOSPADM

## 2023-06-03 RX ORDER — CLOPIDOGREL BISULFATE 75 MG/1
75 TABLET ORAL DAILY
Status: DISCONTINUED | OUTPATIENT
Start: 2023-06-04 | End: 2023-06-05 | Stop reason: HOSPADM

## 2023-06-03 RX ORDER — FLUOXETINE 20 MG/1
60 TABLET, FILM COATED ORAL DAILY
COMMUNITY
Start: 2023-05-04

## 2023-06-03 RX ORDER — LIDOCAINE 40 MG/G
CREAM TOPICAL
Status: DISCONTINUED | OUTPATIENT
Start: 2023-06-03 | End: 2023-06-05 | Stop reason: HOSPADM

## 2023-06-03 RX ORDER — GADOBUTROL 604.72 MG/ML
7 INJECTION INTRAVENOUS ONCE
Status: COMPLETED | OUTPATIENT
Start: 2023-06-03 | End: 2023-06-03

## 2023-06-03 RX ORDER — ATORVASTATIN CALCIUM 80 MG/1
80 TABLET, FILM COATED ORAL EVERY EVENING
COMMUNITY
Start: 2023-03-24

## 2023-06-03 RX ADMIN — ASPIRIN 325 MG: 325 TABLET ORAL at 18:08

## 2023-06-03 RX ADMIN — CLOPIDOGREL BISULFATE 300 MG: 75 TABLET ORAL at 18:08

## 2023-06-03 RX ADMIN — IOPAMIDOL 75 ML: 755 INJECTION, SOLUTION INTRAVENOUS at 18:37

## 2023-06-03 RX ADMIN — GADOBUTROL 7 ML: 604.72 INJECTION INTRAVENOUS at 15:24

## 2023-06-03 ASSESSMENT — ACTIVITIES OF DAILY LIVING (ADL)
ADLS_ACUITY_SCORE: 35

## 2023-06-03 NOTE — PROGRESS NOTES
[facility] ED Handoff Report    ED Chief Complaint:     ED Diagnosis:  (I63.9) Cerebrovascular accident (CVA), unspecified mechanism (H)  Comment:   Plan:        PMH:    Past Medical History:   Diagnosis Date     Aortic dissection (H)      Depression      Diabetes mellitus type 2 in nonobese (H)      HLD (hyperlipidemia)      HTN (hypertension)      Pneumonia      Respiratory failure (H)         Code Status:  Prior     Falls Risk: Yes Band: Applied    Current Living Situation/Residence: lives in a house     Elimination Status: Continent: Yes     Activity Level: SBA  Patients Preferred Language:  Other: Hmong     Needed: Yes    Vital Signs:  BP (!) 178/110   Pulse 92   Temp 98.4  F (36.9  C) (Oral)   Resp 15   SpO2 95%      Cardiac Rhythm:     Pain Score: 0/10    Is the Patient Confused:  Yes at times    Last Food or Drink: 06/03/23 breakfast    Focused Assessment:  Pt alert and oriented x3. disoriented to situations. Speech slurred. Pt is Hmong speaking. MRI/MRI of brain shows acute to subacute stroke. Neuro following. Placement based on CTA result per neurology.     Tests Performed: Done: Labs and Imaging    Treatments Provided:      Family Dynamics/Concerns:     Family Updated On Visitor Policy: No    Plan of Care Communicated to Family: No        Belongings Checklist Done and Signed by Patient: No    Medications sent with patient:     Covid: asymptomatic ,     Additional Information:     RN: Moe Elizalde RN   6/3/2023 6:23 PM

## 2023-06-03 NOTE — CONSULTS
NEUROLOGY CONSULTATION NOTE     Tiffany Lenz,  1970, MRN [id number] Date: 6/3/2023     [facility]   Code status:  Prior   PCP: Denia Ames, [phone]      ASSESSMENT & PLAN   Diagnosis code: Stroke    Stroke  History of hypertension, hyperlipidemia, diabetes  Significant intracranial atherosclerosis   Ulcerative plaque in the left ICA      MRI brain shows 4 mm focus of acute infarct in the left caudate head and several small foci of acute/subacute infarcts in the left corona radiata and basal ganglia    MRA shows multiple high-grade narrowing of intracranial arteries with moderate narrowing of the proximal basilar artery as well    MRA neck further shows ulcerative plaque at the origin of the left internal carotid artery which could be the etiology of the stroke    Carotid ultrasound to further evaluate left carotid with consideration for vascular surgery consultation    Echocardiogram    Cardiac monitoring    Continue aspirin and Plavix    Lipid panel and statin.  Patient was on 80 mg of Lipitor at home    PT/OT/speech therapy       Chief Complaint   Patient presents with     Numbness        HISTORY OF PRESENT ILLNESS     We have been requested by Dr. Winston/Diogo to evaluate Tiffany Lenz who is a 52 year old  male for stroke.  This is a 52-year-old male who presented to the emergency room with 28 hours of tongue numbness.  He does have a history of hypertension, hyperlipidemia, diabetes, aortic dissection.  Reports no weakness.  Speech has been muffled since yesterday at 9 AM.  Patient was not a candidate for thrombolytics.  MRI/MRA were done which showed evidence of stroke as a result neurology was consulted.  Patient reports some improvement in his symptoms.  No previous antiplatelet use.  He is on 80 mg of Lipitor.  No previous history of stroke.     PAST MEDICAL & SURGICAL HISTORY     Medical History  Past Medical History:   Diagnosis Date     Aortic dissection (H)      Depression       Diabetes mellitus type 2 in nonobese (H)      HLD (hyperlipidemia)      HTN (hypertension)      Pneumonia      Respiratory failure (H)      Surgical History  Past Surgical History:   Procedure Laterality Date     ENDOVASCULAR REPAIR ANEURYSM ABDOMINAL AORTA N/A 2/13/2018    Procedure: ENDOVASCULAR REPAIR ANEURYSM ABDOMINAL AORTA;  thoracic endovascular repair of aorta distal to left subclavian artery.  Intravascular ultrasouns of aortic arch, thoracic aorta, and abdominal aorta.  ultrasound guided vascular accessx2, bilateral lower extremity arterial duplex limited study, percutaneous closure of endovascular access x1.  aorta catheterization x1.;  Surgeon: Renata Monique        SOCIAL HISTORY     Social History     Tobacco Use     Smoking status: Some Days     Smokeless tobacco: Never   Substance Use Topics     Alcohol use: No     Drug use: No        FAMILY HISTORY     Reviewed, and noncontributory     ALLERGIES     No Known Allergies     REVIEW OF SYSTEMS     Detailed ROS was done and was negative within the HPI.  Pertinent positives in the HPI.     HOME & HOSPITAL MEDICATIONS     Prior to Admission Medications  (Not in a hospital admission)      Hospital Medications       PHYSICAL EXAM     Vital signs  Temp:  [98.4  F (36.9  C)] 98.4  F (36.9  C)  Pulse:  [] 92  Resp:  [14-20] 15  BP: (178-194)/(110-116) 178/110  SpO2:  [93 %-99 %] 95 %    PHYSICAL EXAMINATION  VITALS: BP (!) 178/110   Pulse 92   Temp 98.4  F (36.9  C) (Oral)   Resp 15   SpO2 95%   GENERAL -Health appearing, No apparent distress  EYES- No scleral icterus, no eyelid droop, Pupils - see Neuro section  HEENT - Normocephalic, atraumatic, Hearing grossly intact; Oral mucosa moist and pink in color. External Ears and nose intact.   Neck - supple   PULM - Good spontaneous respiratory effort;   CV- Pedal pulses present with no peripheral edema/ No significant varicosities.  MSK- Gait - see Neuro section; Strength and tone- see Neuro section; Range  of motion grossly intact.  PSYCH -cooperative    Neurological  Mental status - Patient is awake and grossly oriented to self, place and time. Attention span is normal. Language is fluent and follows commands appropriately.   Cranial nerves - CN II-XII intact. Pupils are reactive and symmetric; EOMI, VFIT, NLF symmetric  Motor - There is no pronator drift. Motor exam shows 5/5 strength in all extremities.  Tone - Tone is symmetric bilaterally in upper and lower extremities.  Reflexes - Reflexes are decreased but symmetric  Sensation - Sensory exam is grossly intact to light touch, pain.  Coordination - Finger to nose and heel to shin is without dysmetria.  Gait and station --able to ambulate with assistance.  Formal gait testing cannot be done due to safety concerns from ongoing medical issues.       DIAGNOSTIC STUDIES     Pertinent Radiology   HEAD MRI:   1.  4 mm focus of acute/subacute infarct in the left caudate head. Several small foci of acute/subacute infarcts in the left corona radiata and basal ganglia with the largest foci measuring 5 mm.  2.  Redemonstration of small chronic hemosiderin staining in the lateral right cerebellar hemisphere.  3.  Multiple small chronic infarcts as detailed above.  4.  Mild diffuse cerebral parenchymal volume loss. Presumed chronic hypertensive/microvascular ischemic white matter changes.     HEAD MRA:   1.  Multiple high-grade narrowings of the intracranial arteries, presumably on atherosclerotic basis.  2.  Moderate narrowings of the supraclinoid internal carotid arteries bilaterally. Short segment high-grade narrowing of the proximal M2 segment of the left middle cerebral artery.   3.  Occluded M2 origin posterior division of the right middle cerebral artery.  4.  High-grade narrowing or occlusion of the intracranial and the intracranial left vertebral artery. Multifocal high-grade narrowings of the intracranial right vertebral artery.   5.  Moderate narrowing of the  proximal basilar artery. Multifocal narrowings of the M2 and M3 segment of the left posterior cerebral artery. The right posterior cerebral artery is patent proximally with moderate narrowing of the distal P3 segment.     NECK MRA:  1.  Ulcerative plaque at the origin of the left internal carotid artery. Less than 50% narrowing of the proximal left internal carotid artery based on the NASCET criteria.  2.  No hemodynamically significant narrowing of the right internal carotid artery based on the NASCET criteria.  3.  Moderate narrowing of the origin of the dominant right vertebral artery. Otherwise both vertebral arteries are patent throughout their course in the neck.       Recent Results (from the past 24 hour(s))   Basic metabolic panel    Collection Time: 06/03/23  1:30 PM   Result Value Ref Range    Sodium 136 136 - 145 mmol/L    Potassium 4.1 3.4 - 5.3 mmol/L    Chloride 102 98 - 107 mmol/L    Carbon Dioxide (CO2) 23 22 - 29 mmol/L    Anion Gap 11 7 - 15 mmol/L    Urea Nitrogen 13.5 6.0 - 20.0 mg/dL    Creatinine 0.89 0.67 - 1.17 mg/dL    Calcium 9.4 8.6 - 10.0 mg/dL    Glucose 280 (H) 70 - 99 mg/dL    GFR Estimate >90 >60 mL/min/1.73m2   Magnesium    Collection Time: 06/03/23  1:30 PM   Result Value Ref Range    Magnesium 2.1 1.7 - 2.3 mg/dL   TSH with free T4 reflex    Collection Time: 06/03/23  1:30 PM   Result Value Ref Range    TSH 1.83 0.30 - 4.20 uIU/mL   Alcohol level blood    Collection Time: 06/03/23  1:30 PM   Result Value Ref Range    Alcohol ethyl <0.01 <=0.01 g/dL   CBC with platelets and differential    Collection Time: 06/03/23  1:30 PM   Result Value Ref Range    WBC Count 8.5 4.0 - 11.0 10e3/uL    RBC Count 4.22 (L) 4.40 - 5.90 10e6/uL    Hemoglobin 13.3 13.3 - 17.7 g/dL    Hematocrit 38.4 (L) 40.0 - 53.0 %    MCV 91 78 - 100 fL    MCH 31.5 26.5 - 33.0 pg    MCHC 34.6 31.5 - 36.5 g/dL    RDW 13.2 10.0 - 15.0 %    Platelet Count 244 150 - 450 10e3/uL    % Neutrophils 66 %    % Lymphocytes 24 %     % Monocytes 7 %    % Eosinophils 2 %    % Basophils 1 %    % Immature Granulocytes 0 %    NRBCs per 100 WBC 0 <1 /100    Absolute Neutrophils 5.7 1.6 - 8.3 10e3/uL    Absolute Lymphocytes 2.0 0.8 - 5.3 10e3/uL    Absolute Monocytes 0.6 0.0 - 1.3 10e3/uL    Absolute Eosinophils 0.2 0.0 - 0.7 10e3/uL    Absolute Basophils 0.0 0.0 - 0.2 10e3/uL    Absolute Immature Granulocytes 0.0 <=0.4 10e3/uL    Absolute NRBCs 0.0 10e3/uL       Total time spent for face to face visit, reviewing labs/imaging studies, counseling and coordination of care was: Over 80 min More than 50% of this time was spent on counseling and coordination of care.    Reviewing chart.  Coordination of care with the primary team.  Reviewing imaging studies.  Patient does not speak English which requires extra time with the exam.    Mandeep Abebe MD  Neurologist  [facility]  Tel:- [phone]

## 2023-06-03 NOTE — CONSULTS
[facility]    Stroke Telephone Note    I was called by Trinh Lind on 06/03/23 regarding patient Tiffany Her. The patient is a 52 year old male with history of HTN, aortic dissection who presented with left tongue numbness since yesterday. No focal deficits on exam.     Imaging Findings   HEAD MRI:   1.  4 mm focus of acute/subacute infarct in the left caudate head. Several small foci of acute/subacute infarcts in the left corona radiata and basal ganglia with the largest foci measuring 5 mm.  2.  Redemonstration of small chronic hemosiderin staining in the lateral right cerebellar hemisphere.  3.  Multiple small chronic infarcts as detailed above.  4.  Mild diffuse cerebral parenchymal volume loss. Presumed chronic hypertensive/microvascular ischemic white matter changes.     HEAD MRA:   1.  Multiple high-grade narrowings of the intracranial arteries, presumably on atherosclerotic basis.  2.  Moderate narrowings of the supraclinoid internal carotid arteries bilaterally. Short segment high-grade narrowing of the proximal M2 segment of the left middle cerebral artery.   3.  Occluded M2 origin posterior division of the right middle cerebral artery.  4.  High-grade narrowing or occlusion of the intracranial and the intracranial left vertebral artery. Multifocal high-grade narrowings of the intracranial right vertebral artery.   5.  Moderate narrowing of the proximal basilar artery. Multifocal narrowings of the M2 and M3 segment of the left posterior cerebral artery. The right posterior cerebral artery is patent proximally with moderate narrowing of the distal P3 segment.     NECK MRA:  1.  Ulcerative plaque at the origin of the left internal carotid artery. Less than 50% narrowing of the proximal left internal carotid artery based on the NASCET criteria.  2.  No hemodynamically significant narrowing of the right internal carotid artery based on the NASCET criteria.  3.  Moderate narrowing  "of the origin of the dominant right vertebral artery. Otherwise both vertebral arteries are patent throughout their course in the neck.     CTA HEAD/NECK:  On my personal read,   Likely ulcerated plaque in the left ICA, <50% stenosis  Severe intracranial atherosclerosis involving all major vessels and occluded right M2 as noted in MR    Impression  Scattered left ARA/MCA territory infarcts  Symptomatic left ICA 2/2 ulcerated plaque  Severe Intracranial atherosclerosis    Recommendations   - Transfer to [facility] Inpatient stroke service for consideration of urgent left carotid intervention  - Consult neurosurgery on arrival to plan for CEA  - Daily aspirin 325 mg for secondary stroke prevention  - Plavix (clopidogrel) 300 mg PO loading dose x 1  - Plavix (clopidogrel) 75 mg PO Daily, duration to be determined  - Statin: Lipitor 80 mg nightly  - TTE (with Bubble Study if age 60 yrs or less)  - Neurochecks and Vital Signs every 4 hrs   - Permissive HTN; goal SBP < 180 mmHg  - Telemetry, EKG  - Bedside Glucose Monitoring  - A1c, Lipid Panel, Troponin x 3  - PT/OT/SLP  - Stroke Education  - Euthermia, Euglycemia    My recommendations are based on the information provided over the phone by Tiffany Her's in-person providers. They are not intended to replace the clinical judgment of his in-person providers. I was not requested to personally see or examine the patient at this time.    Raman Ruggiero MD       Department of Neurology       Securely message with the Vocera Web Console (learn more here)   To page me or covering stroke neurology team member, click here: [email or web address]  Choose \"On Call\" tab at top, then select \"NEUROLOGY/ALL SITES\" from middle drop-down box, press Enter, then look for \"stroke\" or \"telestroke\" for your site.        "

## 2023-06-03 NOTE — ED PROVIDER NOTES
EMERGENCY DEPARTMENT ENCOUNTER     NAME: Tiffany Her   AGE: 52 year old male   YOB: 1970   MRN: [id number]   EVALUATION DATE & TIME: No admission date for patient encounter.   PCP: Denia Ames     Chief Complaint   Patient presents with     Numbness   :    FINAL IMPRESSION       1. Cerebrovascular accident (CVA), unspecified mechanism (H)           ED COURSE & MEDICAL DECISION MAKING      Pertinent Labs & Imaging studies reviewed. (See chart for details)   52 year old male  presents to the Emergency Department for evaluation of tongue numbness since about 28 hours prior to arrival, with family noticing that his speech sounded slurred. Initial Vitals Reviewed. Initial exam notable for patient who to me has a name and stroke of scale of 0, and specifically has intact cranial nerves including cranial nerve XII.  He would be outside the treatment window for acute stroke intervention, so I did not call a stroke code but I did consider a CVA is highest on the differential, but also considered things like alcohol intoxication with a chart documented history of same, thyroid dysfunction, electrolyte abnormality, anemia, vitamin deficiencies that can cause tongue findings.  Labs are generally unremarkable, EKG is nonischemic and without arrhythmia.  I did an MRI/MRA of the brain and neck and it actually does look like he had an acute to subacute stroke that actually seems to have happened in a couple of areas.  Obviously, he requires admission and I have discussed the case with hospitalist for admission.  I also discussed the case with stroke neurology to get a recommendation for antiplatelet therapy, and when he reviewed the imaging, he actually requested that before admitting the patient we get a CTA of the head and neck so they can take a better look at his vascular status.  He does note that he has severe atherosclerosis and wants to ensure the patient is safe to remain at [facility] and needs  better vascular imaging first.  He did recommend aspirin and Plavix which was given in the ED.  At time of signout, CTA result is pending and I have updated the hospitalist that we are not completely sure about the admission, but she is still on board to admit the patient if he does stay here at [facility].        1:12 PM  Introduced myself to the patient, obtained history of present illness, and performed initial physical exam at this time.    4:52 PM I paged neurology for a consult.  5:07 PM I spoke with Dr. Mis MD and he has agreed to accept the patient.    At the conclusion of the encounter I discussed the results of all of the tests and the disposition. The questions were answered. The patient or family acknowledged understanding and was agreeable with the care plan.         Medical Decision Making    History:    Supplemental history from: Family Member/Significant Other    External Record(s) reviewed: Documented in chart, if applicable., medication list, history    Work Up:    Chart documentation includes differential considered and any EKGs or imaging independently interpreted by provider, where specified.    In additional to work up documented, I considered the following work up: Documented in chart, if applicable.    External consultation:    Discussion of management with another provider: Documented in chart, if applicable    Complicating factors:    Care impacted by chronic illness: Diabetes, Hyperlipidemia, Hypertension, Mental Health and Other: Aortic dissection    Care affected by social determinants of health: access to care    Disposition considerations: Admit.        MEDICATIONS GIVEN IN THE EMERGENCY:   Medications   gadobutrol (GADAVIST) injection 7 mL (7 mLs Intravenous $Given 6/3/23 1022)      NEW PRESCRIPTIONS STARTED AT TODAY'S ER VISIT   New Prescriptions    No medications on file     ================================================================   HISTORY OF PRESENT ILLNESS        Patient information was obtained from: from patients sister.   Use of Intrepreter: Yes (Phone ) - Language jeffery Lenz is a 52 year old male with history of hypertension, hyperlipidemia, diabetes, and aortic dissection who presents with tongue numbness.    The patient has had numbness in his tongue since 0900 yesterday (28 hours ago).  Nursing staff says no drift and equal strength/sensation. His speech has been muffled according to his sister, she states this began at 0900 yesterday as well.     ================================================================        PAST HISTORY     PAST MEDICAL HISTORY:   Past Medical History:   Diagnosis Date     Aortic dissection (H)      Depression      Diabetes mellitus type 2 in nonobese (H)      HLD (hyperlipidemia)      HTN (hypertension)      Pneumonia      Respiratory failure (H)       PAST SURGICAL HISTORY:   Past Surgical History:   Procedure Laterality Date     ENDOVASCULAR REPAIR ANEURYSM ABDOMINAL AORTA N/A 2/13/2018    Procedure: ENDOVASCULAR REPAIR ANEURYSM ABDOMINAL AORTA;  thoracic endovascular repair of aorta distal to left subclavian artery.  Intravascular ultrasouns of aortic arch, thoracic aorta, and abdominal aorta.  ultrasound guided vascular accessx2, bilateral lower extremity arterial duplex limited study, percutaneous closure of endovascular access x1.  aorta catheterization x1.;  Surgeon: Renata Monique      CURRENT MEDICATIONS:   AMLODIPINE BESYLATE PO  diltiazem 240 MG 24 hr capsule  diphenhydrAMINE (BENADRYL) 50 MG capsule  FLUOXETINE HCL PO  HYDROCHLOROTHIAZIDE PO  hydrocortisone 0.5 % cream  LABETALOL HCL PO  lisinopril (PRINIVIL/ZESTRIL) 10 MG tablet  metFORMIN (GLUCOPHAGE-XR) 500 MG 24 hr tablet      ALLERGIES:   No Known Allergies   FAMILY HISTORY:   No family history on file.   SOCIAL HISTORY:   Social History     Socioeconomic History     Marital status: Single   Tobacco Use     Smoking status: Some Days     Smokeless tobacco: Never    Substance and Sexual Activity     Alcohol use: No     Drug use: No        VITALS  Patient Vitals for the past 24 hrs:   BP Temp Temp src Pulse Resp SpO2   06/03/23 1550 (!) 178/110 -- -- 92 -- 95 %   06/03/23 1400 (!) 184/114 -- -- 97 15 93 %   06/03/23 1345 (!) 194/116 -- -- 104 14 98 %   06/03/23 1257 (!) 181/113 98.4  F (36.9  C) Oral 104 20 99 %        ================================================================    PHYSICAL EXAM     VITAL SIGNS: BP (!) 178/110   Pulse 92   Temp 98.4  F (36.9  C) (Oral)   Resp 15   SpO2 95%    Constitutional:  Awake, no acute distress   HENT:  Atraumatic, oropharynx without exudate or erythema, membranes moist  Lymph:  No adenopathy  Eyes: EOM intact, PERRL, no injection  Neck: Supple  Respiratory:  Clear to auscultation bilaterally, no wheezes or crackles   Cardiovascular:  Regular rate and rhythm, single S1 and S2   GI:  Soft, nontender, nondistended, no rebound or guarding   Musculoskeletal:  Moves all extremities, no lower extremity edema, no deformities    Skin:  Warm, dry  Neurologic:  Alert and oriented x3, no focal deficits noted     National [employer] Stroke Scale  Exam Interval: baseline   Score    Level of consciousness: (0)   Alert, keenly responsive    LOC questions: (0)   Answers both questions correctly    LOC commands: (0)   Performs both tasks correctly    Best gaze: (0)   Normal    Visual: (0)   No visual loss    Facial palsy: (0)   Normal symmetrical movements    Motor arm (left): (0)   No drift    Motor arm (right): (0)   No drift    Motor leg (left): (0)   No drift    Motor leg (right): (0)   No drift    Limb ataxia: (0)   Absent    Sensory: (0)   Normal- no sensory loss    Best language: (0)   Normal- no aphasia    Dysarthria: (0)   Normal    Extinction and inattention: (0)   No abnormality        Total Score:  0             ================================================================  LAB       All pertinent labs reviewed and  interpreted.   Labs Ordered and Resulted from Time of ED Arrival to Time of ED Departure   BASIC METABOLIC PANEL - Abnormal       Result Value    Sodium 136      Potassium 4.1      Chloride 102      Carbon Dioxide (CO2) 23      Anion Gap 11      Urea Nitrogen 13.5      Creatinine 0.89      Calcium 9.4      Glucose 280 (*)     GFR Estimate >90     CBC WITH PLATELETS AND DIFFERENTIAL - Abnormal    WBC Count 8.5      RBC Count 4.22 (*)     Hemoglobin 13.3      Hematocrit 38.4 (*)     MCV 91      MCH 31.5      MCHC 34.6      RDW 13.2      Platelet Count 244      % Neutrophils 66      % Lymphocytes 24      % Monocytes 7      % Eosinophils 2      % Basophils 1      % Immature Granulocytes 0      NRBCs per 100 WBC 0      Absolute Neutrophils 5.7      Absolute Lymphocytes 2.0      Absolute Monocytes 0.6      Absolute Eosinophils 0.2      Absolute Basophils 0.0      Absolute Immature Granulocytes 0.0      Absolute NRBCs 0.0     MAGNESIUM - Normal    Magnesium 2.1     TSH WITH FREE T4 REFLEX - Normal    TSH 1.83     ETHYL ALCOHOL LEVEL - Normal    Alcohol ethyl <0.01          ===============================================================  RADIOLOGY       Reviewed all pertinent imaging. Please see official radiology report.   MRA Neck (Carotids) wo & w Contrast   Final Result   IMPRESSION:   HEAD MRI:    1.  4 mm focus of acute/subacute infarct in the left caudate head. Several small foci of acute/subacute infarcts in the left corona radiata and basal ganglia with the largest foci measuring 5 mm.   2.  Redemonstration of small chronic hemosiderin staining in the lateral right cerebellar hemisphere.   3.  Multiple small chronic infarcts as detailed above.   4.  Mild diffuse cerebral parenchymal volume loss. Presumed chronic hypertensive/microvascular ischemic white matter changes.      HEAD MRA:    1.  Multiple high-grade narrowings of the intracranial arteries, presumably on atherosclerotic basis.   2.  Moderate narrowings of  the supraclinoid internal carotid arteries bilaterally. Short segment high-grade narrowing of the proximal M2 segment of the left middle cerebral artery.    3.  Occluded M2 origin posterior division of the right middle cerebral artery.   4.  High-grade narrowing or occlusion of the intracranial and the intracranial left vertebral artery. Multifocal high-grade narrowings of the intracranial right vertebral artery.    5.  Moderate narrowing of the proximal basilar artery. Multifocal narrowings of the M2 and M3 segment of the left posterior cerebral artery. The right posterior cerebral artery is patent proximally with moderate narrowing of the distal P3 segment.      NECK MRA:   1.  Ulcerative plaque at the origin of the left internal carotid artery. Less than 50% narrowing of the proximal left internal carotid artery based on the NASCET criteria.   2.  No hemodynamically significant narrowing of the right internal carotid artery based on the NASCET criteria.   3.  Moderate narrowing of the origin of the dominant right vertebral artery. Otherwise both vertebral arteries are patent throughout their course in the neck.       MRA Brain ([place]) wo Contrast   Final Result   IMPRESSION:   HEAD MRI:    1.  4 mm focus of acute/subacute infarct in the left caudate head. Several small foci of acute/subacute infarcts in the left corona radiata and basal ganglia with the largest foci measuring 5 mm.   2.  Redemonstration of small chronic hemosiderin staining in the lateral right cerebellar hemisphere.   3.  Multiple small chronic infarcts as detailed above.   4.  Mild diffuse cerebral parenchymal volume loss. Presumed chronic hypertensive/microvascular ischemic white matter changes.      HEAD MRA:    1.  Multiple high-grade narrowings of the intracranial arteries, presumably on atherosclerotic basis.   2.  Moderate narrowings of the supraclinoid internal carotid arteries bilaterally. Short segment high-grade narrowing  of the proximal M2 segment of the left middle cerebral artery.    3.  Occluded M2 origin posterior division of the right middle cerebral artery.   4.  High-grade narrowing or occlusion of the intracranial and the intracranial left vertebral artery. Multifocal high-grade narrowings of the intracranial right vertebral artery.    5.  Moderate narrowing of the proximal basilar artery. Multifocal narrowings of the M2 and M3 segment of the left posterior cerebral artery. The right posterior cerebral artery is patent proximally with moderate narrowing of the distal P3 segment.      NECK MRA:   1.  Ulcerative plaque at the origin of the left internal carotid artery. Less than 50% narrowing of the proximal left internal carotid artery based on the NASCET criteria.   2.  No hemodynamically significant narrowing of the right internal carotid artery based on the NASCET criteria.   3.  Moderate narrowing of the origin of the dominant right vertebral artery. Otherwise both vertebral arteries are patent throughout their course in the neck.       MR Brain w/o & w Contrast   Final Result   IMPRESSION:   HEAD MRI:    1.  4 mm focus of acute/subacute infarct in the left caudate head. Several small foci of acute/subacute infarcts in the left corona radiata and basal ganglia with the largest foci measuring 5 mm.   2.  Redemonstration of small chronic hemosiderin staining in the lateral right cerebellar hemisphere.   3.  Multiple small chronic infarcts as detailed above.   4.  Mild diffuse cerebral parenchymal volume loss. Presumed chronic hypertensive/microvascular ischemic white matter changes.      HEAD MRA:    1.  Multiple high-grade narrowings of the intracranial arteries, presumably on atherosclerotic basis.   2.  Moderate narrowings of the supraclinoid internal carotid arteries bilaterally. Short segment high-grade narrowing of the proximal M2 segment of the left middle cerebral artery.    3.  Occluded M2 origin posterior division  of the right middle cerebral artery.   4.  High-grade narrowing or occlusion of the intracranial and the intracranial left vertebral artery. Multifocal high-grade narrowings of the intracranial right vertebral artery.    5.  Moderate narrowing of the proximal basilar artery. Multifocal narrowings of the M2 and M3 segment of the left posterior cerebral artery. The right posterior cerebral artery is patent proximally with moderate narrowing of the distal P3 segment.      NECK MRA:   1.  Ulcerative plaque at the origin of the left internal carotid artery. Less than 50% narrowing of the proximal left internal carotid artery based on the NASCET criteria.   2.  No hemodynamically significant narrowing of the right internal carotid artery based on the NASCET criteria.   3.  Moderate narrowing of the origin of the dominant right vertebral artery. Otherwise both vertebral arteries are patent throughout their course in the neck.       CTA Head Neck with Contrast    (Results Pending)         ================================================================  EKG       EKG reviewed interpreted by me shows sinus tachycardia with a rate of 101, normal axis, QTc 508 with no acute ST or T wave changes since January 27 when QTc was 497  I have independently reviewed and interpreted the EKG(s) documented above.     ================================================================  PROCEDURES         I, Ricardo Silveira , am serving as a scribe to document services personally performed by Dr. Lind based on my observation and the provider's statements to me. I, Trinh Lind MD attest that Ricardo Silveira  is acting in a scribe capacity, has observed my performance of the services and has documented them in accordance with my direction.     Trinh Lind M.D.   Emergency Medicine   [facility] EMERGENCY DEPARTMENT  [address] [place] [id number] [phone]  Dept:  [phone]       Trinh Lind MD  06/03/23 2431

## 2023-06-03 NOTE — ED TRIAGE NOTES
Patient arrives by private car with his sister for evaluation of numb sensation to his tongue since yesterday.  Patient denies numbness or tingling in arms or legs.  Reports that he feels that he has a fever, but is fever free in triage.  Also reports that he feels like its hard to breath.  O2 in triage is 99%.

## 2023-06-03 NOTE — PHARMACY-ADMISSION MEDICATION HISTORY
Pharmacist Admission Medication History    Admission medication history is complete. The information provided in this note is only as accurate as the sources available at the time of the update.    Medication reconciliation/reorder completed by provider prior to medication history? No    Information Source(s): Patient and CareEverywhere/SureScripts via in-person with ong  and surgical mask    Pertinent Information: Patient recently filled glipizide ER 10mg daily and mirtazapine 7.5mg HS but he has not started glipizide yet and was told not to take mirtazapine anymore.     Changes made to PTA medication list:    Added: Atorvastatin    Deleted: Amlodipine, diltiazem, diphenhydramine, hydrochlorothiazide, hydrocortisone cream, labetalol    Changed: Fluoxetine 20mg to 60mg daily, lisinopril 10mg to 40mg daily    Medication Affordability:  Not including over the counter (OTC) medications, was there a time in the past 3 months when you did not take your medications as prescribed because of cost?: No    Allergies reviewed with patient and updates made in EHR: yes    Medication History Completed By: Mary Jo Babin, PharmD 6/3/2023 6:31 PM    Prior to Admission medications    Medication Sig Last Dose Taking? Auth Provider Long Term End Date   atorvastatin (LIPITOR) 80 MG tablet Take 80 mg by mouth every evening 6/2/2023 at PM Yes Unknown, Entered By History Yes    FLUoxetine (PROZAC) 20 MG capsule Take 20 mg by mouth daily Along with 40mg for total 60mg daily 6/3/2023 at 0800 Yes Unknown, Entered By History     FLUoxetine (PROZAC) 40 MG capsule Take 40 mg by mouth daily Along with 20mg for total 60mg daily 6/3/2023 at 0800 Yes Unknown, Entered By History     lisinopril (ZESTRIL) 40 MG tablet Take 40 mg by mouth daily 6/3/2023 at 0800 Yes Unknown, Entered By History No    metFORMIN (GLUCOPHAGE-XR) 500 MG 24 hr tablet Take 2,000 mg by mouth daily (with dinner) 6/2/2023 at PM Yes Unknown, Entered By History Yes

## 2023-06-04 ENCOUNTER — APPOINTMENT (OUTPATIENT)
Dept: OCCUPATIONAL THERAPY | Facility: CLINIC | Age: 53
End: 2023-06-04
Attending: STUDENT IN AN ORGANIZED HEALTH CARE EDUCATION/TRAINING PROGRAM
Payer: COMMERCIAL

## 2023-06-04 ENCOUNTER — APPOINTMENT (OUTPATIENT)
Dept: CARDIOLOGY | Facility: CLINIC | Age: 53
End: 2023-06-04
Attending: STUDENT IN AN ORGANIZED HEALTH CARE EDUCATION/TRAINING PROGRAM
Payer: COMMERCIAL

## 2023-06-04 ENCOUNTER — APPOINTMENT (OUTPATIENT)
Dept: SPEECH THERAPY | Facility: CLINIC | Age: 53
End: 2023-06-04
Attending: STUDENT IN AN ORGANIZED HEALTH CARE EDUCATION/TRAINING PROGRAM
Payer: COMMERCIAL

## 2023-06-04 LAB
ANION GAP SERPL CALCULATED.3IONS-SCNC: 13 MMOL/L (ref 7–15)
BUN SERPL-MCNC: 11.9 MG/DL (ref 6–20)
CALCIUM SERPL-MCNC: 9.1 MG/DL (ref 8.6–10)
CHLORIDE SERPL-SCNC: 107 MMOL/L (ref 98–107)
CREAT SERPL-MCNC: 0.93 MG/DL (ref 0.67–1.17)
DEPRECATED HCO3 PLAS-SCNC: 19 MMOL/L (ref 22–29)
ERYTHROCYTE [DISTWIDTH] IN BLOOD BY AUTOMATED COUNT: 13.2 % (ref 10–15)
GFR SERPL CREATININE-BSD FRML MDRD: >90 ML/MIN/1.73M2
GLUCOSE BLDC GLUCOMTR-MCNC: 125 MG/DL (ref 70–99)
GLUCOSE BLDC GLUCOMTR-MCNC: 152 MG/DL (ref 70–99)
GLUCOSE BLDC GLUCOMTR-MCNC: 189 MG/DL (ref 70–99)
GLUCOSE BLDC GLUCOMTR-MCNC: 203 MG/DL (ref 70–99)
GLUCOSE BLDC GLUCOMTR-MCNC: 366 MG/DL (ref 70–99)
GLUCOSE SERPL-MCNC: 186 MG/DL (ref 70–99)
HBA1C MFR BLD: 9.6 %
HCT VFR BLD AUTO: 41 % (ref 40–53)
HGB BLD-MCNC: 13.5 G/DL (ref 13.3–17.7)
LVEF ECHO: NORMAL
MAGNESIUM SERPL-MCNC: 2 MG/DL (ref 1.7–2.3)
MCH RBC QN AUTO: 30.7 PG (ref 26.5–33)
MCHC RBC AUTO-ENTMCNC: 32.9 G/DL (ref 31.5–36.5)
MCV RBC AUTO: 93 FL (ref 78–100)
PHOSPHATE SERPL-MCNC: 3.2 MG/DL (ref 2.5–4.5)
PLATELET # BLD AUTO: 210 10E3/UL (ref 150–450)
POTASSIUM SERPL-SCNC: 3.9 MMOL/L (ref 3.4–5.3)
RBC # BLD AUTO: 4.4 10E6/UL (ref 4.4–5.9)
SARS-COV-2 RNA RESP QL NAA+PROBE: NEGATIVE
SODIUM SERPL-SCNC: 139 MMOL/L (ref 136–145)
TROPONIN I SERPL-MCNC: 0.44 NG/ML (ref 0–0.29)
TROPONIN T SERPL HS-MCNC: 49 NG/L
TROPONIN T SERPL HS-MCNC: 56 NG/L
WBC # BLD AUTO: 9.2 10E3/UL (ref 4–11)

## 2023-06-04 PROCEDURE — 250N000013 HC RX MED GY IP 250 OP 250 PS 637: Performed by: STUDENT IN AN ORGANIZED HEALTH CARE EDUCATION/TRAINING PROGRAM

## 2023-06-04 PROCEDURE — 84100 ASSAY OF PHOSPHORUS: CPT | Performed by: NURSE PRACTITIONER

## 2023-06-04 PROCEDURE — 99223 1ST HOSP IP/OBS HIGH 75: CPT | Performed by: PSYCHIATRY & NEUROLOGY

## 2023-06-04 PROCEDURE — 87635 SARS-COV-2 COVID-19 AMP PRB: CPT | Performed by: STUDENT IN AN ORGANIZED HEALTH CARE EDUCATION/TRAINING PROGRAM

## 2023-06-04 PROCEDURE — 999N000147 HC STATISTIC PT IP EVAL DEFER

## 2023-06-04 PROCEDURE — 97530 THERAPEUTIC ACTIVITIES: CPT | Mod: GO

## 2023-06-04 PROCEDURE — 97165 OT EVAL LOW COMPLEX 30 MIN: CPT | Mod: GO

## 2023-06-04 PROCEDURE — 84484 ASSAY OF TROPONIN QUANT: CPT | Performed by: STUDENT IN AN ORGANIZED HEALTH CARE EDUCATION/TRAINING PROGRAM

## 2023-06-04 PROCEDURE — 83735 ASSAY OF MAGNESIUM: CPT | Performed by: NURSE PRACTITIONER

## 2023-06-04 PROCEDURE — 93306 TTE W/DOPPLER COMPLETE: CPT | Mod: 26 | Performed by: INTERNAL MEDICINE

## 2023-06-04 PROCEDURE — 80048 BASIC METABOLIC PNL TOTAL CA: CPT | Performed by: STUDENT IN AN ORGANIZED HEALTH CARE EDUCATION/TRAINING PROGRAM

## 2023-06-04 PROCEDURE — 85027 COMPLETE CBC AUTOMATED: CPT | Performed by: STUDENT IN AN ORGANIZED HEALTH CARE EDUCATION/TRAINING PROGRAM

## 2023-06-04 PROCEDURE — 83036 HEMOGLOBIN GLYCOSYLATED A1C: CPT | Performed by: NURSE PRACTITIONER

## 2023-06-04 PROCEDURE — 255N000002 HC RX 255 OP 636: Performed by: INTERNAL MEDICINE

## 2023-06-04 PROCEDURE — 250N000011 HC RX IP 250 OP 636: Performed by: STUDENT IN AN ORGANIZED HEALTH CARE EDUCATION/TRAINING PROGRAM

## 2023-06-04 PROCEDURE — 99418 PROLNG IP/OBS E/M EA 15 MIN: CPT | Performed by: PSYCHIATRY & NEUROLOGY

## 2023-06-04 PROCEDURE — 36415 COLL VENOUS BLD VENIPUNCTURE: CPT | Performed by: STUDENT IN AN ORGANIZED HEALTH CARE EDUCATION/TRAINING PROGRAM

## 2023-06-04 PROCEDURE — 999N000208 ECHOCARDIOGRAM COMPLETE

## 2023-06-04 PROCEDURE — 84484 ASSAY OF TROPONIN QUANT: CPT | Performed by: NURSE PRACTITIONER

## 2023-06-04 PROCEDURE — 250N000012 HC RX MED GY IP 250 OP 636 PS 637: Performed by: NURSE PRACTITIONER

## 2023-06-04 PROCEDURE — 97535 SELF CARE MNGMENT TRAINING: CPT | Mod: GO

## 2023-06-04 PROCEDURE — 250N000013 HC RX MED GY IP 250 OP 250 PS 637: Performed by: NURSE PRACTITIONER

## 2023-06-04 PROCEDURE — 200N000002 HC R&B ICU UMMC

## 2023-06-04 PROCEDURE — 92610 EVALUATE SWALLOWING FUNCTION: CPT | Mod: GN

## 2023-06-04 RX ORDER — POLYETHYLENE GLYCOL 3350 17 G/17G
17 POWDER, FOR SOLUTION ORAL DAILY
Status: DISCONTINUED | OUTPATIENT
Start: 2023-06-04 | End: 2023-06-05 | Stop reason: HOSPADM

## 2023-06-04 RX ORDER — NICOTINE POLACRILEX 4 MG
15-30 LOZENGE BUCCAL
Status: DISCONTINUED | OUTPATIENT
Start: 2023-06-04 | End: 2023-06-05 | Stop reason: HOSPADM

## 2023-06-04 RX ORDER — LISINOPRIL 5 MG/1
5 TABLET ORAL DAILY
Status: DISCONTINUED | OUTPATIENT
Start: 2023-06-04 | End: 2023-06-04

## 2023-06-04 RX ORDER — ACYCLOVIR 200 MG/1
12 CAPSULE ORAL ONCE
Status: DISCONTINUED | OUTPATIENT
Start: 2023-06-04 | End: 2023-06-05 | Stop reason: HOSPADM

## 2023-06-04 RX ORDER — GLIPIZIDE 10 MG/1
10 TABLET, FILM COATED, EXTENDED RELEASE ORAL DAILY
COMMUNITY
End: 2023-06-19

## 2023-06-04 RX ORDER — AMOXICILLIN 250 MG
1 CAPSULE ORAL AT BEDTIME
Status: DISCONTINUED | OUTPATIENT
Start: 2023-06-04 | End: 2023-06-05 | Stop reason: HOSPADM

## 2023-06-04 RX ORDER — HEPARIN SODIUM 5000 [USP'U]/.5ML
5000 INJECTION, SOLUTION INTRAVENOUS; SUBCUTANEOUS EVERY 8 HOURS SCHEDULED
Status: DISCONTINUED | OUTPATIENT
Start: 2023-06-04 | End: 2023-06-05 | Stop reason: HOSPADM

## 2023-06-04 RX ORDER — NICOTINE POLACRILEX 4 MG
15-30 LOZENGE BUCCAL
Status: DISCONTINUED | OUTPATIENT
Start: 2023-06-04 | End: 2023-06-04

## 2023-06-04 RX ORDER — LISINOPRIL 10 MG/1
10 TABLET ORAL DAILY
Status: DISCONTINUED | OUTPATIENT
Start: 2023-06-04 | End: 2023-06-04

## 2023-06-04 RX ORDER — ACETAMINOPHEN 500 MG
1000 TABLET ORAL EVERY 6 HOURS PRN
Status: DISCONTINUED | OUTPATIENT
Start: 2023-06-04 | End: 2023-06-05 | Stop reason: HOSPADM

## 2023-06-04 RX ORDER — MAGNESIUM OXIDE 400 MG/1
400 TABLET ORAL EVERY 4 HOURS
Status: COMPLETED | OUTPATIENT
Start: 2023-06-04 | End: 2023-06-04

## 2023-06-04 RX ORDER — LISINOPRIL 20 MG/1
20 TABLET ORAL DAILY
Status: DISCONTINUED | OUTPATIENT
Start: 2023-06-05 | End: 2023-06-05 | Stop reason: HOSPADM

## 2023-06-04 RX ORDER — DEXTROSE MONOHYDRATE 25 G/50ML
25-50 INJECTION, SOLUTION INTRAVENOUS
Status: DISCONTINUED | OUTPATIENT
Start: 2023-06-04 | End: 2023-06-04

## 2023-06-04 RX ORDER — DEXTROSE MONOHYDRATE 25 G/50ML
25-50 INJECTION, SOLUTION INTRAVENOUS
Status: DISCONTINUED | OUTPATIENT
Start: 2023-06-04 | End: 2023-06-05 | Stop reason: HOSPADM

## 2023-06-04 RX ORDER — LISINOPRIL 10 MG/1
10 TABLET ORAL ONCE
Status: COMPLETED | OUTPATIENT
Start: 2023-06-04 | End: 2023-06-04

## 2023-06-04 RX ADMIN — HEPARIN SODIUM 5000 UNITS: 5000 INJECTION, SOLUTION INTRAVENOUS; SUBCUTANEOUS at 16:37

## 2023-06-04 RX ADMIN — NICOTINE 1 PATCH: 7 PATCH, EXTENDED RELEASE TRANSDERMAL at 07:41

## 2023-06-04 RX ADMIN — LISINOPRIL 5 MG: 5 TABLET ORAL at 09:55

## 2023-06-04 RX ADMIN — Medication 400 MG: at 13:50

## 2023-06-04 RX ADMIN — POLYETHYLENE GLYCOL 3350 17 G: 17 POWDER, FOR SOLUTION ORAL at 12:13

## 2023-06-04 RX ADMIN — ACETAMINOPHEN 1000 MG: 500 TABLET, FILM COATED ORAL at 20:49

## 2023-06-04 RX ADMIN — LABETALOL HYDROCHLORIDE 10 MG: 5 INJECTION, SOLUTION INTRAVENOUS at 20:49

## 2023-06-04 RX ADMIN — LISINOPRIL 10 MG: 10 TABLET ORAL at 14:17

## 2023-06-04 RX ADMIN — METFORMIN ER 500 MG 2000 MG: 500 TABLET ORAL at 16:37

## 2023-06-04 RX ADMIN — HEPARIN SODIUM 5000 UNITS: 5000 INJECTION, SOLUTION INTRAVENOUS; SUBCUTANEOUS at 07:42

## 2023-06-04 RX ADMIN — HUMAN ALBUMIN MICROSPHERES AND PERFLUTREN 6 ML: 10; .22 INJECTION, SOLUTION INTRAVENOUS at 10:17

## 2023-06-04 RX ADMIN — ASPIRIN 325 MG ORAL TABLET 325 MG: 325 PILL ORAL at 09:56

## 2023-06-04 RX ADMIN — ATORVASTATIN CALCIUM 80 MG: 80 TABLET, FILM COATED ORAL at 20:12

## 2023-06-04 RX ADMIN — INSULIN ASPART 5 UNITS: 100 INJECTION, SOLUTION INTRAVENOUS; SUBCUTANEOUS at 12:12

## 2023-06-04 RX ADMIN — CLOPIDOGREL BISULFATE 75 MG: 75 TABLET ORAL at 09:56

## 2023-06-04 RX ADMIN — Medication 400 MG: at 18:05

## 2023-06-04 RX ADMIN — FLUOXETINE 60 MG: 20 CAPSULE ORAL at 09:55

## 2023-06-04 ASSESSMENT — ACTIVITIES OF DAILY LIVING (ADL)
ADLS_ACUITY_SCORE: 33
ADLS_ACUITY_SCORE: 31
ADLS_ACUITY_SCORE: 33
ADLS_ACUITY_SCORE: 31
ADLS_ACUITY_SCORE: 33
ADLS_ACUITY_SCORE: 31
ADLS_ACUITY_SCORE: 33
ADLS_ACUITY_SCORE: 31

## 2023-06-04 ASSESSMENT — VISUAL ACUITY
OU: NORMAL ACUITY
OU: NORMAL ACUITY;GLASSES
OU: NORMAL ACUITY
OU: NORMAL ACUITY

## 2023-06-04 NOTE — ED NOTES
EMERGENCY DEPARTMENT SIGNOUT NOTE    Patient signed out to me from Trinh Lind MD.      Tiffany Lenz is a 52 year old male who endorses tongue numbness since 9 AM on 6/2, as well as muffled speech according to sister.       RADIOLOGY/LABS:  Reviewed all pertinent imaging. Please see official radiology report. All pertinent labs reviewed and interpreted.    Results for orders placed or performed during the hospital encounter of 06/03/23   MR Brain w/o & w Contrast    Impression    IMPRESSION:  HEAD MRI:   1.  4 mm focus of acute/subacute infarct in the left caudate head. Several small foci of acute/subacute infarcts in the left corona radiata and basal ganglia with the largest foci measuring 5 mm.  2.  Redemonstration of small chronic hemosiderin staining in the lateral right cerebellar hemisphere.  3.  Multiple small chronic infarcts as detailed above.  4.  Mild diffuse cerebral parenchymal volume loss. Presumed chronic hypertensive/microvascular ischemic white matter changes.    HEAD MRA:   1.  Multiple high-grade narrowings of the intracranial arteries, presumably on atherosclerotic basis.  2.  Moderate narrowings of the supraclinoid internal carotid arteries bilaterally. Short segment high-grade narrowing of the proximal M2 segment of the left middle cerebral artery.   3.  Occluded M2 origin posterior division of the right middle cerebral artery.  4.  High-grade narrowing or occlusion of the intracranial and the intracranial left vertebral artery. Multifocal high-grade narrowings of the intracranial right vertebral artery.   5.  Moderate narrowing of the proximal basilar artery. Multifocal narrowings of the M2 and M3 segment of the left posterior cerebral artery. The right posterior cerebral artery is patent proximally with moderate narrowing of the distal P3 segment.    NECK MRA:  1.  Ulcerative plaque at the origin of the left internal carotid artery. Less than 50% narrowing of the proximal left internal carotid  artery based on the NASCET criteria.  2.  No hemodynamically significant narrowing of the right internal carotid artery based on the NASCET criteria.  3.  Moderate narrowing of the origin of the dominant right vertebral artery. Otherwise both vertebral arteries are patent throughout their course in the neck.    MRA Brain ([place]) wo Contrast    Impression    IMPRESSION:  HEAD MRI:   1.  4 mm focus of acute/subacute infarct in the left caudate head. Several small foci of acute/subacute infarcts in the left corona radiata and basal ganglia with the largest foci measuring 5 mm.  2.  Redemonstration of small chronic hemosiderin staining in the lateral right cerebellar hemisphere.  3.  Multiple small chronic infarcts as detailed above.  4.  Mild diffuse cerebral parenchymal volume loss. Presumed chronic hypertensive/microvascular ischemic white matter changes.    HEAD MRA:   1.  Multiple high-grade narrowings of the intracranial arteries, presumably on atherosclerotic basis.  2.  Moderate narrowings of the supraclinoid internal carotid arteries bilaterally. Short segment high-grade narrowing of the proximal M2 segment of the left middle cerebral artery.   3.  Occluded M2 origin posterior division of the right middle cerebral artery.  4.  High-grade narrowing or occlusion of the intracranial and the intracranial left vertebral artery. Multifocal high-grade narrowings of the intracranial right vertebral artery.   5.  Moderate narrowing of the proximal basilar artery. Multifocal narrowings of the M2 and M3 segment of the left posterior cerebral artery. The right posterior cerebral artery is patent proximally with moderate narrowing of the distal P3 segment.    NECK MRA:  1.  Ulcerative plaque at the origin of the left internal carotid artery. Less than 50% narrowing of the proximal left internal carotid artery based on the NASCET criteria.  2.  No hemodynamically significant narrowing of the right internal carotid  artery based on the NASCET criteria.  3.  Moderate narrowing of the origin of the dominant right vertebral artery. Otherwise both vertebral arteries are patent throughout their course in the neck.    MRA Neck (Carotids) wo & w Contrast    Impression    IMPRESSION:  HEAD MRI:   1.  4 mm focus of acute/subacute infarct in the left caudate head. Several small foci of acute/subacute infarcts in the left corona radiata and basal ganglia with the largest foci measuring 5 mm.  2.  Redemonstration of small chronic hemosiderin staining in the lateral right cerebellar hemisphere.  3.  Multiple small chronic infarcts as detailed above.  4.  Mild diffuse cerebral parenchymal volume loss. Presumed chronic hypertensive/microvascular ischemic white matter changes.    HEAD MRA:   1.  Multiple high-grade narrowings of the intracranial arteries, presumably on atherosclerotic basis.  2.  Moderate narrowings of the supraclinoid internal carotid arteries bilaterally. Short segment high-grade narrowing of the proximal M2 segment of the left middle cerebral artery.   3.  Occluded M2 origin posterior division of the right middle cerebral artery.  4.  High-grade narrowing or occlusion of the intracranial and the intracranial left vertebral artery. Multifocal high-grade narrowings of the intracranial right vertebral artery.   5.  Moderate narrowing of the proximal basilar artery. Multifocal narrowings of the M2 and M3 segment of the left posterior cerebral artery. The right posterior cerebral artery is patent proximally with moderate narrowing of the distal P3 segment.    NECK MRA:  1.  Ulcerative plaque at the origin of the left internal carotid artery. Less than 50% narrowing of the proximal left internal carotid artery based on the NASCET criteria.  2.  No hemodynamically significant narrowing of the right internal carotid artery based on the NASCET criteria.  3.  Moderate narrowing of the origin of the dominant right vertebral artery.  Otherwise both vertebral arteries are patent throughout their course in the neck.    CTA Head Neck with Contrast    Impression    IMPRESSION:   HEAD CT:  1.  No acute intracranial hemorrhage, extra-axial collection, midline shift.  2.  Known acute to subacute lacunar infarcts in the left cerebral hemisphere are better appreciated on comparison MRI.  3.  Moderate chronic small vessel ischemic change and remote lacunar infarction as above.    HEAD CTA:   1.  Occluded superior division M2 origin on the right.  2.  Occluded right A2 segment with short segment reconstitution and reocclusion of the A3 segment. Distal A4 and A5 segment normal.  3.  No appreciable flow in the right V4 segment concerning for proximal stenosis or occlusion.  4.  Severe stenosis of the right clinoid and supraclinoid internal carotid .  5.  Severe stenosis of the left supraclinoid internal carotid .  6.  Segmental high-grade stenosis of the distal left M1/proximal left M2 segment    NECK CTA:  1.  Gradual loss of contrast in the left vertebral artery with minimal contrast at the V3 segment and no appreciable contrast in the left V4 segment. Although no focal stenosis is seen findings are concerning for underlying severe stenosis or occlusion.  2.  Irregular ulcerative plaque of the left carotid bulb with approximately 50-70% stenosis by NASCET criteria.   US Carotid Bilateral    Impression    IMPRESSION:  1.  Mild plaque formation, velocities consistent with less than 50% stenosis in the right internal carotid artery.  2.  Mild plaque formation, velocities consistent with less than 50% stenosis in the left internal carotid artery.  3.  Flow within the vertebral arteries is antegrade.   Basic metabolic panel   Result Value Ref Range    Sodium 136 136 - 145 mmol/L    Potassium 4.1 3.4 - 5.3 mmol/L    Chloride 102 98 - 107 mmol/L    Carbon Dioxide (CO2) 23 22 - 29 mmol/L    Anion Gap 11 7 - 15 mmol/L    Urea Nitrogen 13.5 6.0 - 20.0 mg/dL     Creatinine 0.89 0.67 - 1.17 mg/dL    Calcium 9.4 8.6 - 10.0 mg/dL    Glucose 280 (H) 70 - 99 mg/dL    GFR Estimate >90 >60 mL/min/1.73m2   Result Value Ref Range    Magnesium 2.1 1.7 - 2.3 mg/dL   TSH with free T4 reflex   Result Value Ref Range    TSH 1.83 0.30 - 4.20 uIU/mL   Alcohol level blood   Result Value Ref Range    Alcohol ethyl <0.01 <=0.01 g/dL   CBC with platelets and differential   Result Value Ref Range    WBC Count 8.5 4.0 - 11.0 10e3/uL    RBC Count 4.22 (L) 4.40 - 5.90 10e6/uL    Hemoglobin 13.3 13.3 - 17.7 g/dL    Hematocrit 38.4 (L) 40.0 - 53.0 %    MCV 91 78 - 100 fL    MCH 31.5 26.5 - 33.0 pg    MCHC 34.6 31.5 - 36.5 g/dL    RDW 13.2 10.0 - 15.0 %    Platelet Count 244 150 - 450 10e3/uL    % Neutrophils 66 %    % Lymphocytes 24 %    % Monocytes 7 %    % Eosinophils 2 %    % Basophils 1 %    % Immature Granulocytes 0 %    NRBCs per 100 WBC 0 <1 /100    Absolute Neutrophils 5.7 1.6 - 8.3 10e3/uL    Absolute Lymphocytes 2.0 0.8 - 5.3 10e3/uL    Absolute Monocytes 0.6 0.0 - 1.3 10e3/uL    Absolute Eosinophils 0.2 0.0 - 0.7 10e3/uL    Absolute Basophils 0.0 0.0 - 0.2 10e3/uL    Absolute Immature Granulocytes 0.0 <=0.4 10e3/uL    Absolute NRBCs 0.0 10e3/uL         ED COURSE :  Pertinent Labs & Imaging studies reviewed. (See chart for details)  At the time of signout patient was pending CTA per recommendation from stroke neurology.  Ultimately spoke with stroke neurology and they had concerns over the left carotid ulcerative plaque.  Think patient warrants emergent transfer to the [facility] for some form of endovascular therapy, potential stent versus endarterectomy.  Unfortunately there is not any hospital beds currently at the , but the ER physician there was gracious enough to accept patient in transfer.  Initially patient was declining transfer, stating [facility], and it was very difficult on the family to get there.  However I got  patient's sister/emergency contact on the phone who was able to help convince him.  Transferred emergently ER to ER to the [facility].      ED Course as of 06/03/23 2000   Sat Jun 03, 2023   1720 Patient signed out to me from Trinh Lind MD   1904 Spoke w stroke neuro., Dr. Ruggiero.   Would recommend transfer to  over [facility] for interventional for the left carotid. Recommend  and below.    1910 Spoke w bed placement at [facility]   1918 No beds at the  as of now, will do ED to ED transfer   1920 Dr. Glez @ [facility] ED accepts   1921 [facility] to [facility] hospital[facility] Dr Winston and updated with plan for transfer.   1928 I called language line but there are no [facility] [place] [facility] interpre[facility] available.   1936 Pt slurring speech significantly per interp. No family present.    1939 I spoke to patient's sister on the phone. Sister is willing to help convince the patient to go the [facility] for treatment.   1950 Sister convinced patient to transfer       FINAL IMPRESSION:  1. Cerebrovascular accident (CVA), unspecified mechanism (H)          CRITICAL CARE:  Critical Care  Performed by: Erin Butt MD  Authorized by: Erin Butt MD     Total critical care time: 45 minutes  Criticalcare time was exclusive of separately billable procedures and treating other patients.  Critical care was necessary to treat or prevent imminent or life-threatening deterioration of the following conditions: Acute CVA requiring endovascular intervention  Critical care was time spent personally by me on the following activities: development of treatment plan with patient or surrogate, discussions with consultants, examination of patient, evaluation of patient's response totreatment, obtaining history from patient or surrogate, ordering and performing treatments and interventions, ordering and review of laboratory studies, ordering and review of radiographic studies and re-evaluation ofpatient's condition, this excludes any separately  billable procedures.    The creation of this record is based on the scribe s observations of the work being performed by Erin Butt MD and the provider s statements to them. It was created on his behalf by Joe Lopez, a trained medical scribe. This document has been checked and approved by the attending provider.    Erin Butt MD  Emergency Medicine  [facility] EMERGENCY DEPARTMENT  [address] [place] [id number] [phone]  Dept: [phone]      Erin Butt MD  06/03/23 2001

## 2023-06-04 NOTE — PHARMACY-CONSULT NOTE
Pharmacy Consult to evaluate for medication related stroke core measures    Tiffany Lenz, 52 year old male admitted for left tongue numbness and slurred speech on 6/3/2023.    Thrombolytic was not given - NIHSS = 0.     VTE Prophylaxis SCDs /PCDs placed on 6/3/23, and heparin given on 6/4/23 as appropriate prior to end of hospital day 2.    Antithrombotic: aspirin and clopidogrel started on 6/3/23, as appropriate by end of hospital day 2. Continue antithrombotic therapy on discharge to meet quality measures, unless contraindicated.    Anticoagulation if history of A-fib/flutter: Patient does not have history of A-fib/flutter - anticoagulation not required for medication related stroke core measures.     LDL Cholesterol Calculated   Date Value Ref Range Status   06/03/2023 112 (H) <=100 mg/dL Final     LDL Cholesterol Direct   Date Value Ref Range Status   01/04/2022 141 (H) <=129 mg/dL Final       Patient's home statin, Lipitor (atorvastatin) restarted; continue statin on discharge to meet quality measures, unless contraindicated. If patient reports good adherence to statin therapy, may need to consider alternative/adjunctive therapy if further LDL reduction is indicated.      Recommendations: None at this time    Thank you for the consult.    Na Heath RPH 6/4/2023 7:58 AM

## 2023-06-04 NOTE — H&P
[facility]    Stroke Admission Note    Chief Complaint  Stroke    TANO Lenz is a 52 year old Hmong speaking male with history of HTN, hyperlipidemia, DM 2, endovascular repair aneurysm abdominal aorta (2018) and aortic dissection presented to [facility] ED with left tongue numbness and slurred speech that started yesterday morning. NIHSS 0. Head MRI revealed 4 mm focus of acute/subacute infarct in the left caudate head and everal small foci of acute/subacute infarcts in the left corona radiata and basal ganglia. MRA revealed severe b/l intracranial atherosclerosis, Right M2 Occlusion and also ulcerated left ICA plaque with %50 stenosis. Per discussion with [facility] stroke attending patient was loaded with  mg and Plavix 300 mg and transferred to [facility] for consideration of CEA by neurosurgery service.    Intravenous Thrombolysis  Not given due to:   - unclear or unfavorable risk-benefit profile for extended window thrombolysis beyond the conventional 4.5 hour time window    Endovascular Treatment  Not initiated due to absence of proximal vessel occlusion    Impression   Acute ischemic stroke of left basal ganglia due to large-artery atherosclerosis    Plan  Acute Ischemic Stroke (without tPA) Plan  - Admit to Stroke Neurology  - Neurochecks Q 4 hours  - Permissive HTN; labetalol PRN for SBP > 200  - Consult neurosurgery  - Avoid hypotonic IV fluids  - Daily aspirin 325 mg for secondary stroke prevention  - Plavix (clopidogrel) 75 mg PO Daily, duration to be determined  - Statin: Lipitor 80 mg  - Telemetry, EKG  - Bedside Glucose Monitoring  - A1c, Lipid Panel, Troponin x 3  - Rehab (PT/OT/SLP) services NOT required due to lack of ongoing deficit  - Stroke Education  - Depression Screen  - Apnea Screen  - Euthermia, Euglycemia        #Essential HTN  - Hold PTA Lisinopril    # Hyperlipidemioa  - Lipitor 80 mg at bedtime    # DM  - Glucose monitoring  - Cont PTA  "Metformin  - Hypoglycemia protocol    #Nicotine use disorder  - Nicotine patch        Diet: Npo, pending speech evaluation    Prophylaxis            For VTE Prevention:  - pneumatic compression device    For Acid Suppression:  - GI prophylaxis is not indicated    Code Status  Full Code      Patient was admitted via transfer from [place].    The patient will be admitted to the Neuro Critical Care/Stroke team..     The patient was discussed with Stroke Staff Dr. Ruggiero.    Thelma Jackson MD  Neurology Resident PGY4    ___________________________________________________  Nutrition:   None    Clinically Significant Risk Factors Present on Admission                  # Hypertension: Home medication list includes antihypertensive(s)      # Overweight: Estimated body mass index is 26.53 kg/m  as calculated from the following:    Height as of this encounter: 1.575 m (5' 2.01\").    Weight as of this encounter: 65.8 kg (145 lb 1 oz).           Past Medical History   Past Medical History:   Diagnosis Date     Aortic dissection (H)      Depression      Diabetes mellitus type 2 in nonobese (H)      HLD (hyperlipidemia)      HTN (hypertension)      Pneumonia      Respiratory failure (H)      Past Surgical History   Past Surgical History:   Procedure Laterality Date     ENDOVASCULAR REPAIR ANEURYSM ABDOMINAL AORTA N/A 2/13/2018    Procedure: ENDOVASCULAR REPAIR ANEURYSM ABDOMINAL AORTA;  thoracic endovascular repair of aorta distal to left subclavian artery.  Intravascular ultrasouns of aortic arch, thoracic aorta, and abdominal aorta.  ultrasound guided vascular accessx2, bilateral lower extremity arterial duplex limited study, percutaneous closure of endovascular access x1.  aorta catheterization x1.;  Surgeon: Renata Monique     Medications   Home Meds  Prior to Admission medications    Medication Sig Start Date End Date Taking? Authorizing Provider   atorvastatin (LIPITOR) 80 MG tablet Take 80 mg by mouth every evening " 3/24/23   Unknown, Entered By History   FLUoxetine (PROZAC) 20 MG capsule Take 20 mg by mouth daily Along with 40mg for total 60mg daily 5/4/23   Unknown, Entered By History   FLUoxetine (PROZAC) 40 MG capsule Take 40 mg by mouth daily Along with 20mg for total 60mg daily 5/4/23   Unknown, Entered By History   lisinopril (ZESTRIL) 40 MG tablet Take 40 mg by mouth daily 5/1/23   Unknown, Entered By History   metFORMIN (GLUCOPHAGE-XR) 500 MG 24 hr tablet Take 2,000 mg by mouth daily (with dinner)    Unknown, Entered By History         Allergies   No Known Allergies  Family History   No family history on file.  Social History   Social History     Tobacco Use     Smoking status: Some Days     Smokeless tobacco: Never   Substance Use Topics     Alcohol use: No     Drug use: No       Review of Systems   The 10 point Review of Systems is negative other than noted in the HPI or here.        PHYSICAL EXAMINATION  Temp:  [98.4  F (36.9  C)-98.7  F (37.1  C)] 98.7  F (37.1  C)  Pulse:  [] 99  Resp:  [14-20] 18  BP: (172-223)/(110-147) 172/123  SpO2:  [93 %-99 %] 95 %    General:  patient lying in bed without any acute distress    HEENT:  normocephalic/atraumatic  Cardio:  RRR  Pulmonary:  no respiratory distress  Abdomen:  soft  Extremities:  no edema  Skin:  intact     Neurologic    (Neuro exam is limited due to failed multiple [place] [facility]  [place]  Mental Status: alert, alert, attentive, follows some simple commands,  Cranial Nerves: PERRL, EOMI with normal smooth pursuit, reports decreased light touch sensation on right face and right tongue, very subtle right nasolabial fold flattening, facial movements symmetric, hearing not formally tested but intact to conversation,  shoulder shrug strong bilaterally  Motor:  normal muscle tone and bulk, no abnormal movements, able to move all limbs spontaneously, strength 5/5 throughout upper and lower extremities, no pronator drift  Reflexes:  toes  down-going  Sensory:  light touch sensation intact and symmetric throughout upper and lower extremities, no extinction on double simultaneous stimulation   Coordination:  normal finger-to-nose and heel-to-shin not tested  Station/Gait:  Normal gait    Dysphagia Screen  Dysarthria or facial droop present - Maintain NPO, consult SLP    Stroke Scales    National Institutes of Health Stroke Scale  Exam Interval: Baseline   Score    Level of consciousness: (0)   Alert, keenly responsive    LOC questions: (0)   Answers both questions correctly    LOC commands: (0)   Performs both tasks correctly    Best gaze: (0)   Normal    Visual: (0)   No visual loss    Facial palsy: (0)   Normal symmetrical movements    Motor arm (left): (0)   No drift    Motor arm (right): (0)   No drift    Motor leg (left): (0)   No drift    Motor leg (right): (0)   No drift    Limb ataxia: (0)   Absent    Sensory: (1)   Mild to moderate sensory loss    Best language: (0)   Normal- no aphasia (Unable to assess due to lack of access to )    Dysarthria: (0)   Normal (Unable to assess due to lack of access to )    Extinction and inattention: (0)   No abnormality        Total Score:  1         Imaging  I personally reviewed all imaging; relevant findings per the HPI.    Lab Results Data   CBC  Recent Labs   Lab 06/03/23  1330   WBC 8.5   RBC 4.22*   HGB 13.3   HCT 38.4*        Basic Metabolic Panel   Recent Labs   Lab 06/03/23  1330      POTASSIUM 4.1   CHLORIDE 102   CO2 23   BUN 13.5   CR 0.89   *   LYNNE 9.4     Liver Panel  No results for input(s): PROTTOTAL, ALBUMIN, BILITOTAL, ALKPHOS, AST, ALT, BILIDIRECT in the last 168 hours.  INR    Recent Labs   Lab Test 01/27/23  1206 02/13/18  0639 02/03/18  0908   INR 0.97 1.04 1.02      Lipid Profile    Recent Labs   Lab Test 08/25/22  1036 04/05/22  0816 01/04/22  0851   CHOL 212* 238* 262*   HDL 34* 36* 39*   * 134* 141*   TRIG 257* 338* 633*     A1C     Recent Labs   Lab Test 02/03/18  0908 01/13/18  0556 01/04/16  1125   A1C 8.4* 8.5* 7.0*     Troponin  No results for input(s): CTROPT, TROPONINIS, TROPONINI, GHTROP in the last 168 hours.       Stroke Code / Stroke Consult Data Data    Not a stroke code

## 2023-06-04 NOTE — PLAN OF CARE
4A PT Defer: Per discussion with OT patient mobilizing at baseline and without acute PT needs. Patient may benefit from OP follow-up for chronic knee pain. MD made aware.

## 2023-06-04 NOTE — PHARMACY-ADMISSION MEDICATION HISTORY
Admission medication history completed by PharmD at [facility]. Please see PharmD Admission Medication History note from 6/3/23.    Hakeem Onofre, Melo, BCPS

## 2023-06-04 NOTE — PLAN OF CARE
Major Shift Events:  Pt A&O x4, intermittently able to make his needs known. Some dysarthria noted. Pupils equal and reactive. Moves all extremities purposefully. Denies numbness and tingling in extremities, but complains of intermittent numbness in tongue. Ambulates with a SBA and cane. Denied pain. On RA, O2 sats stable. Sinus tach during shift w/ T wave abnormality per EKG. DBP elevated, NCC aware. Scheduled lisinopril ordered. Afebrile. Cleared by SLP for Easy to chew diet. BG elevated, NCC notified. Sliding scale and scheduled metformin ordered.  Voids spontaneously. No BM.   Plan: Transfer pt out of ICU when bed is available. Notify team of any acute changes.   For vital signs and complete assessments, please see documentation flowsheets.     Problem: Plan of Care - These are the overarching goals to be used throughout the patient stay.    Goal: Optimal Comfort and Wellbeing  Outcome: Progressing  Problem: Plan of Care - These are the overarching goals to be used throughout the patient stay.    Goal: Readiness for Transition of Care  Outcome: Progressing

## 2023-06-04 NOTE — ED PROVIDER NOTES
[facility] [place] EMERGENCY DEPARTMENT ([facility])    6/03/23       ED PROVIDER NOTE  ED23      History     Chief Complaint   Patient presents with     Numbness     The history is provided by the patient and medical records. The history is limited by a language barrier and the condition of the patient.     Tiffany Lenz is a 52 year old male with a past medical history of aortic dissection, endovascular repair aneurysm abdominal aorta (2018), type 2 diabetes mellitus, HLD, HTN, respiratory failure who presents to the emergency department for tongue numbness. Patient was seen at [facility] at 12:30 PM today for tongue numbness that began around 28 hours prior to his arrival there. His family noticed that his speech sounded slurred.  Imaging there revealed occlusions (see impression below).  Stroke neurology had concerns over the left carotid ulcerative plaque and the patient was thought to warrant emergent transfer to [facility] for some form of endovascular therapy, potential stent versus endarterectomy. Here in the ED, patient states he has speech problems, chest pain, and tongue numbness but is unable to specify when they began.     EXAM: US CAROTID BILATERAL  LOCATION: [facility]  DATE/TIME: 6/3/2023 7:45 PM CDT  IMPRESSION:  1.  Mild plaque formation, velocities consistent with less than 50% stenosis in the right internal carotid artery.  2.  Mild plaque formation, velocities consistent with less than 50% stenosis in the left internal carotid artery.  3.  Flow within the vertebral arteries is antegrade.    EXAM: CTA HEAD NECK W CONTRAST  LOCATION: [facility]  DATE/TIME: 6/3/2023 6:37 PM CDT  IMPRESSION:   HEAD CT:  1.  No acute intracranial hemorrhage, extra-axial collection, midline shift.  2.  Known acute to subacute lacunar infarcts in the left cerebral hemisphere are better appreciated on comparison MRI.  3.  Moderate  chronic small vessel ischemic change and remote lacunar infarction as above.     HEAD CTA:   1.  Occluded superior division M2 origin on the right.  2.  Occluded right A2 segment with short segment reconstitution and reocclusion of the A3 segment. Distal A4 and A5 segment normal.  3.  No appreciable flow in the right V4 segment concerning for proximal stenosis or occlusion.  4.  Severe stenosis of the right clinoid and supraclinoid internal carotid .  5.  Severe stenosis of the left supraclinoid internal carotid .  6.  Segmental high-grade stenosis of the distal left M1/proximal left M2 segment     NECK CTA:  1.  Gradual loss of contrast in the left vertebral artery with minimal contrast at the V3 segment and no appreciable contrast in the left V4 segment. Although no focal stenosis is seen findings are concerning for underlying severe stenosis or occlusion.  2.  Irregular ulcerative plaque of the left carotid bulb with approximately 50-70% stenosis by NASCET criteria.    EXAM: MR BRAIN W/O and W CONTRAST, MRA NECK (CAROTIDS) W/O and W CONTRAST, MRA BRAIN ([place] OF SUNSHINE) W/O CONTRAST  LOCATION: [facility]  DATE/TIME: 6/3/2023 3:30 PM CDT  IMPRESSION:  HEAD MRI:   1.  4 mm focus of acute/subacute infarct in the left caudate head. Several small foci of acute/subacute infarcts in the left corona radiata and basal ganglia with the largest foci measuring 5 mm.  2.  Redemonstration of small chronic hemosiderin staining in the lateral right cerebellar hemisphere.  3.  Multiple small chronic infarcts as detailed above.  4.  Mild diffuse cerebral parenchymal volume loss. Presumed chronic hypertensive/microvascular ischemic white matter changes.     HEAD MRA:   1.  Multiple high-grade narrowings of the intracranial arteries, presumably on atherosclerotic basis.  2.  Moderate narrowings of the supraclinoid internal carotid arteries bilaterally. Short segment high-grade narrowing of the proximal M2  segment of the left middle cerebral artery.   3.  Occluded M2 origin posterior division of the right middle cerebral artery.  4.  High-grade narrowing or occlusion of the intracranial and the intracranial left vertebral artery. Multifocal high-grade narrowings of the intracranial right vertebral artery.   5.  Moderate narrowing of the proximal basilar artery. Multifocal narrowings of the M2 and M3 segment of the left posterior cerebral artery. The right posterior cerebral artery is patent proximally with moderate narrowing of the distal P3 segment.     NECK MRA:  1.  Ulcerative plaque at the origin of the left internal carotid artery. Less than 50% narrowing of the proximal left internal carotid artery based on the NASCET criteria.  2.  No hemodynamically significant narrowing of the right internal carotid artery based on the NASCET criteria.  3.  Moderate narrowing of the origin of the dominant right vertebral artery. Otherwise both vertebral arteries are patent throughout their course in the neck.      Past Medical History  Past Medical History:   Diagnosis Date     Aortic dissection (H)      Depression      Diabetes mellitus type 2 in nonobese (H)      HLD (hyperlipidemia)      HTN (hypertension)      Pneumonia      Respiratory failure (H)      Past Surgical History:   Procedure Laterality Date     ENDOVASCULAR REPAIR ANEURYSM ABDOMINAL AORTA N/A 2/13/2018    Procedure: ENDOVASCULAR REPAIR ANEURYSM ABDOMINAL AORTA;  thoracic endovascular repair of aorta distal to left subclavian artery.  Intravascular ultrasouns of aortic arch, thoracic aorta, and abdominal aorta.  ultrasound guided vascular accessx2, bilateral lower extremity arterial duplex limited study, percutaneous closure of endovascular access x1.  aorta catheterization x1.;  Surgeon: Renata Monique     atorvastatin (LIPITOR) 80 MG tablet  FLUoxetine (PROZAC) 20 MG capsule  FLUoxetine (PROZAC) 40 MG capsule  lisinopril (ZESTRIL) 40 MG tablet  metFORMIN  (GLUCOPHAGE-XR) 500 MG 24 hr tablet      No Known Allergies  Family History  No family history on file.  Social History   Social History     Tobacco Use     Smoking status: Some Days     Smokeless tobacco: Never   Substance Use Topics     Alcohol use: No     Drug use: No      Past medical history, past surgical history, medications, allergies, family history, and social history were reviewed with the patient. No additional pertinent items.      A complete review of systems was performed with pertinent positives and negatives noted in the HPI, and all other systems negative.    Physical Exam      Physical Exam  Constitutional:       General: He is not in acute distress.     Appearance: He is well-developed. He is not ill-appearing, toxic-appearing or diaphoretic.   HENT:      Head: Normocephalic and atraumatic.   Cardiovascular:      Rate and Rhythm: Normal rate and regular rhythm.      Pulses: Normal pulses.      Heart sounds: Normal heart sounds.   Pulmonary:      Effort: Pulmonary effort is normal. No respiratory distress.      Breath sounds: No wheezing.   Abdominal:      General: There is no distension.      Palpations: Abdomen is soft.      Tenderness: There is no abdominal tenderness. There is no rebound.   Musculoskeletal:      Cervical back: Normal range of motion and neck supple.   Skin:     General: Skin is warm.   Neurological:      Mental Status: He is alert and oriented to person, place, and time.      Comments: Slurred speech; normal strength in all extremities    Psychiatric:         Mood and Affect: Mood normal.         Behavior: Behavior normal.         Thought Content: Thought content normal.           ED Course, Procedures, & Data      Procedures       ED Course Selections:        EKG Interpretation:      Interpreted by Sandra Rosen MD  Time reviewed: 2053  Symptoms at time of EKG: none   Rhythm: normal sinus   Rate: normal  Axis: normal  Ectopy: none  Conduction: normal  ST Segments/ T  Waves: No ST-T wave changes  Q Waves: none  Comparison to prior: Unchanged    Clinical Impression: normal EKG                     Results for orders placed or performed during the hospital encounter of 06/03/23   MR Brain w/o & w Contrast     Status: None    Narrative    EXAM: MR BRAIN W/O and W CONTRAST, MRA NECK (CAROTIDS) W/O and W CONTRAST, MRA BRAIN ([place] OF SUNSHINE) W/O CONTRAST  LOCATION: [facility]  DATE/TIME: 6/3/2023 3:30 PM CDT    INDICATION: tongue numbness and speech change x28 hours  COMPARISON: Brain MRI 01/0/2016  CONTRAST: 7ml Gadavist  TECHNIQUE:   1) Routine multiplanar multisequence head MRI without and with intravenous contrast.  2) 3D time-of-flight head MRA without intravenous contrast.  3) Neck MRA without and with IV contrast. Stenosis measurements made according to NASCET criteria unless otherwise specified.    FINDINGS:  HEAD MRI:  INTRACRANIAL CONTENTS: 4 mm focus of restricted diffusion in the left caudate head. Several small foci of restricted diffusion in the left corona radiata and basal ganglia with the largest foci measuring 5 mm. Stable small focus of chronic hemosiderin   staining in the lateral right cerebellar hemisphere. Mild diffuse cerebral parenchymal volume loss. No midline shift. The basilar cisterns are patent. Moderate periventricular and scattered foci of deep white matter T2 prolongation involving both   cerebral hemispheres. Chronic infarcts involving the right cerebellar hemisphere and the left middle cerebellar peduncle. Chronic lacunae in the bilateral corona radiata. No cerebellar tonsillar ectopia. Postcontrast images demonstrate no abnormal   enhancement.    SELLA: No abnormality accounting for technique.    OSSEOUS STRUCTURES/SOFT TISSUES: Normal marrow signal. The major intracranial vascular flow voids are maintained.     ORBITS: No abnormality accounting for technique.     SINUSES/MASTOIDS: Large mucous retention cysts with  associated mild mucosal thickening of the right maxillary sinus. Mild mucosal thickening of the ethmoid air cells and the left maxillary sinus. No middle ear or mastoid effusion.     HEAD MRA:   ANTERIOR CIRCULATION: Moderate narrowings of the supraclinoid internal carotid arteries bilaterally. Short segment high-grade narrowing of the proximal M2 segment of the left middle cerebral artery. The anterior cerebral arteries are patent without   hemodynamically significant stenosis. Occluded M2 origin posterior division of the right middle cerebral artery. Standard [place] anatomy.    POSTERIOR CIRCULATION: High-grade narrowing or occlusion of the intracranial and the intracranial left vertebral artery. Multifocal high-grade narrowings of the intracranial right vertebral artery. Moderate narrowing of the proximal basilar artery.   Multifocal narrowings of the M2 and M3 segment of the left posterior cerebral artery. The right posterior cerebral artery is patent proximally with moderate narrowing of the distal P3 segment. Dominant right and smaller left vertebral artery contribute   to a normal basilar artery.     NECK MRA:   RIGHT CAROTID: No measurable stenosis or dissection.    LEFT CAROTID: Ulcerative plaque at the origin of the left internal carotid artery. Less than 50% narrowing of the proximal left internal carotid artery based on the NASCET criteria.    VERTEBRAL ARTERIES: Moderate narrowing of the origin of the dominant right vertebral artery. Otherwise both vertebral arteries are patent throughout their course in the neck.     AORTIC ARCH: Classic aortic arch anatomy with no significant stenosis at the origin of the great vessels.      Impression    IMPRESSION:  HEAD MRI:   1.  4 mm focus of acute/subacute infarct in the left caudate head. Several small foci of acute/subacute infarcts in the left corona radiata and basal ganglia with the largest foci measuring 5 mm.  2.  Redemonstration of small  chronic hemosiderin staining in the lateral right cerebellar hemisphere.  3.  Multiple small chronic infarcts as detailed above.  4.  Mild diffuse cerebral parenchymal volume loss. Presumed chronic hypertensive/microvascular ischemic white matter changes.    HEAD MRA:   1.  Multiple high-grade narrowings of the intracranial arteries, presumably on atherosclerotic basis.  2.  Moderate narrowings of the supraclinoid internal carotid arteries bilaterally. Short segment high-grade narrowing of the proximal M2 segment of the left middle cerebral artery.   3.  Occluded M2 origin posterior division of the right middle cerebral artery.  4.  High-grade narrowing or occlusion of the intracranial and the intracranial left vertebral artery. Multifocal high-grade narrowings of the intracranial right vertebral artery.   5.  Moderate narrowing of the proximal basilar artery. Multifocal narrowings of the M2 and M3 segment of the left posterior cerebral artery. The right posterior cerebral artery is patent proximally with moderate narrowing of the distal P3 segment.    NECK MRA:  1.  Ulcerative plaque at the origin of the left internal carotid artery. Less than 50% narrowing of the proximal left internal carotid artery based on the NASCET criteria.  2.  No hemodynamically significant narrowing of the right internal carotid artery based on the NASCET criteria.  3.  Moderate narrowing of the origin of the dominant right vertebral artery. Otherwise both vertebral arteries are patent throughout their course in the neck.    MRA Brain ([place] of [place]) wo Contrast     Status: None    Narrative    EXAM: MR BRAIN W/O and W CONTRAST, MRA NECK (CAROTIDS) W/O and W CONTRAST, MRA BRAIN ([place] OF [place]) W/O CONTRAST  LOCATION: [facility]  DATE/TIME: 6/3/2023 3:30 PM CDT    INDICATION: tongue numbness and speech change x28 hours  COMPARISON: Brain MRI 01/0/2016  CONTRAST: 7ml Gadavist  TECHNIQUE:   1) Routine  multiplanar multisequence head MRI without and with intravenous contrast.  2) 3D time-of-flight head MRA without intravenous contrast.  3) Neck MRA without and with IV contrast. Stenosis measurements made according to NASCET criteria unless otherwise specified.    FINDINGS:  HEAD MRI:  INTRACRANIAL CONTENTS: 4 mm focus of restricted diffusion in the left caudate head. Several small foci of restricted diffusion in the left corona radiata and basal ganglia with the largest foci measuring 5 mm. Stable small focus of chronic hemosiderin   staining in the lateral right cerebellar hemisphere. Mild diffuse cerebral parenchymal volume loss. No midline shift. The basilar cisterns are patent. Moderate periventricular and scattered foci of deep white matter T2 prolongation involving both   cerebral hemispheres. Chronic infarcts involving the right cerebellar hemisphere and the left middle cerebellar peduncle. Chronic lacunae in the bilateral corona radiata. No cerebellar tonsillar ectopia. Postcontrast images demonstrate no abnormal   enhancement.    SELLA: No abnormality accounting for technique.    OSSEOUS STRUCTURES/SOFT TISSUES: Normal marrow signal. The major intracranial vascular flow voids are maintained.     ORBITS: No abnormality accounting for technique.     SINUSES/MASTOIDS: Large mucous retention cysts with associated mild mucosal thickening of the right maxillary sinus. Mild mucosal thickening of the ethmoid air cells and the left maxillary sinus. No middle ear or mastoid effusion.     HEAD MRA:   ANTERIOR CIRCULATION: Moderate narrowings of the supraclinoid internal carotid arteries bilaterally. Short segment high-grade narrowing of the proximal M2 segment of the left middle cerebral artery. The anterior cerebral arteries are patent without   hemodynamically significant stenosis. Occluded M2 origin posterior division of the right middle cerebral artery. Standard [place] of [place] anatomy.    POSTERIOR CIRCULATION:  High-grade narrowing or occlusion of the intracranial and the intracranial left vertebral artery. Multifocal high-grade narrowings of the intracranial right vertebral artery. Moderate narrowing of the proximal basilar artery.   Multifocal narrowings of the M2 and M3 segment of the left posterior cerebral artery. The right posterior cerebral artery is patent proximally with moderate narrowing of the distal P3 segment. Dominant right and smaller left vertebral artery contribute   to a normal basilar artery.     NECK MRA:   RIGHT CAROTID: No measurable stenosis or dissection.    LEFT CAROTID: Ulcerative plaque at the origin of the left internal carotid artery. Less than 50% narrowing of the proximal left internal carotid artery based on the NASCET criteria.    VERTEBRAL ARTERIES: Moderate narrowing of the origin of the dominant right vertebral artery. Otherwise both vertebral arteries are patent throughout their course in the neck.     AORTIC ARCH: Classic aortic arch anatomy with no significant stenosis at the origin of the great vessels.      Impression    IMPRESSION:  HEAD MRI:   1.  4 mm focus of acute/subacute infarct in the left caudate head. Several small foci of acute/subacute infarcts in the left corona radiata and basal ganglia with the largest foci measuring 5 mm.  2.  Redemonstration of small chronic hemosiderin staining in the lateral right cerebellar hemisphere.  3.  Multiple small chronic infarcts as detailed above.  4.  Mild diffuse cerebral parenchymal volume loss. Presumed chronic hypertensive/microvascular ischemic white matter changes.    HEAD MRA:   1.  Multiple high-grade narrowings of the intracranial arteries, presumably on atherosclerotic basis.  2.  Moderate narrowings of the supraclinoid internal carotid arteries bilaterally. Short segment high-grade narrowing of the proximal M2 segment of the left middle cerebral artery.   3.  Occluded M2 origin posterior division of the right middle  cerebral artery.  4.  High-grade narrowing or occlusion of the intracranial and the intracranial left vertebral artery. Multifocal high-grade narrowings of the intracranial right vertebral artery.   5.  Moderate narrowing of the proximal basilar artery. Multifocal narrowings of the M2 and M3 segment of the left posterior cerebral artery. The right posterior cerebral artery is patent proximally with moderate narrowing of the distal P3 segment.    NECK MRA:  1.  Ulcerative plaque at the origin of the left internal carotid artery. Less than 50% narrowing of the proximal left internal carotid artery based on the NASCET criteria.  2.  No hemodynamically significant narrowing of the right internal carotid artery based on the NASCET criteria.  3.  Moderate narrowing of the origin of the dominant right vertebral artery. Otherwise both vertebral arteries are patent throughout their course in the neck.    MRA Neck (Carotids) wo & w Contrast     Status: None    Narrative    EXAM: MR BRAIN W/O and W CONTRAST, MRA NECK (CAROTIDS) W/O and W CONTRAST, MRA BRAIN ([place] OF SUNSHINE) W/O CONTRAST  LOCATION: [facility]  DATE/TIME: 6/3/2023 3:30 PM CDT    INDICATION: tongue numbness and speech change x28 hours  COMPARISON: Brain MRI 01/0/2016  CONTRAST: 7ml Gadavist  TECHNIQUE:   1) Routine multiplanar multisequence head MRI without and with intravenous contrast.  2) 3D time-of-flight head MRA without intravenous contrast.  3) Neck MRA without and with IV contrast. Stenosis measurements made according to NASCET criteria unless otherwise specified.    FINDINGS:  HEAD MRI:  INTRACRANIAL CONTENTS: 4 mm focus of restricted diffusion in the left caudate head. Several small foci of restricted diffusion in the left corona radiata and basal ganglia with the largest foci measuring 5 mm. Stable small focus of chronic hemosiderin   staining in the lateral right cerebellar hemisphere. Mild diffuse cerebral parenchymal  volume loss. No midline shift. The basilar cisterns are patent. Moderate periventricular and scattered foci of deep white matter T2 prolongation involving both   cerebral hemispheres. Chronic infarcts involving the right cerebellar hemisphere and the left middle cerebellar peduncle. Chronic lacunae in the bilateral corona radiata. No cerebellar tonsillar ectopia. Postcontrast images demonstrate no abnormal   enhancement.    SELLA: No abnormality accounting for technique.    OSSEOUS STRUCTURES/SOFT TISSUES: Normal marrow signal. The major intracranial vascular flow voids are maintained.     ORBITS: No abnormality accounting for technique.     SINUSES/MASTOIDS: Large mucous retention cysts with associated mild mucosal thickening of the right maxillary sinus. Mild mucosal thickening of the ethmoid air cells and the left maxillary sinus. No middle ear or mastoid effusion.     HEAD MRA:   ANTERIOR CIRCULATION: Moderate narrowings of the supraclinoid internal carotid arteries bilaterally. Short segment high-grade narrowing of the proximal M2 segment of the left middle cerebral artery. The anterior cerebral arteries are patent without   hemodynamically significant stenosis. Occluded M2 origin posterior division of the right middle cerebral artery. Standard [place] of [place] anatomy.    POSTERIOR CIRCULATION: High-grade narrowing or occlusion of the intracranial and the intracranial left vertebral artery. Multifocal high-grade narrowings of the intracranial right vertebral artery. Moderate narrowing of the proximal basilar artery.   Multifocal narrowings of the M2 and M3 segment of the left posterior cerebral artery. The right posterior cerebral artery is patent proximally with moderate narrowing of the distal P3 segment. Dominant right and smaller left vertebral artery contribute   to a normal basilar artery.     NECK MRA:   RIGHT CAROTID: No measurable stenosis or dissection.    LEFT CAROTID: Ulcerative plaque at the  origin of the left internal carotid artery. Less than 50% narrowing of the proximal left internal carotid artery based on the NASCET criteria.    VERTEBRAL ARTERIES: Moderate narrowing of the origin of the dominant right vertebral artery. Otherwise both vertebral arteries are patent throughout their course in the neck.     AORTIC ARCH: Classic aortic arch anatomy with no significant stenosis at the origin of the great vessels.      Impression    IMPRESSION:  HEAD MRI:   1.  4 mm focus of acute/subacute infarct in the left caudate head. Several small foci of acute/subacute infarcts in the left corona radiata and basal ganglia with the largest foci measuring 5 mm.  2.  Redemonstration of small chronic hemosiderin staining in the lateral right cerebellar hemisphere.  3.  Multiple small chronic infarcts as detailed above.  4.  Mild diffuse cerebral parenchymal volume loss. Presumed chronic hypertensive/microvascular ischemic white matter changes.    HEAD MRA:   1.  Multiple high-grade narrowings of the intracranial arteries, presumably on atherosclerotic basis.  2.  Moderate narrowings of the supraclinoid internal carotid arteries bilaterally. Short segment high-grade narrowing of the proximal M2 segment of the left middle cerebral artery.   3.  Occluded M2 origin posterior division of the right middle cerebral artery.  4.  High-grade narrowing or occlusion of the intracranial and the intracranial left vertebral artery. Multifocal high-grade narrowings of the intracranial right vertebral artery.   5.  Moderate narrowing of the proximal basilar artery. Multifocal narrowings of the M2 and M3 segment of the left posterior cerebral artery. The right posterior cerebral artery is patent proximally with moderate narrowing of the distal P3 segment.    NECK MRA:  1.  Ulcerative plaque at the origin of the left internal carotid artery. Less than 50% narrowing of the proximal left internal carotid artery based on the NASCET  criteria.  2.  No hemodynamically significant narrowing of the right internal carotid artery based on the NASCET criteria.  3.  Moderate narrowing of the origin of the dominant right vertebral artery. Otherwise both vertebral arteries are patent throughout their course in the neck.    CTA Head Neck with Contrast     Status: None    Narrative    EXAM: CTA HEAD NECK W CONTRAST  LOCATION: [facility]  DATE/TIME: 6/3/2023 6:37 PM CDT    INDICATION: abnormal MRI  COMPARISON: MRI 06/03/2023  CONTRAST: isovue [id number]  75ml  TECHNIQUE: Head and neck CT angiogram with IV contrast. Noncontrast head CT followed by axial helical CT images of the head and neck vessels obtained during the arterial phase of intravenous contrast administration. Axial 2D reconstructed images and   multiplanar 3D MIP reconstructed images of the head and neck vessels were performed by the technologist. Dose reduction techniques were used. All stenosis measurements made according to NASCET criteria unless otherwise specified.    FINDINGS:   NONCONTRAST HEAD CT:   INTRACRANIAL CONTENTS: No intracranial hemorrhage, extraaxial collection, or mass effect.  The known foci of acute/subacute infarction in the left caudate head and left corona radiata are better appreciated on comparison MRI. There is no evidence of   hemorrhagic transformation. Moderate presumed chronic small vessel ischemic changes. Remote bilateral cerebellar, corona radiata, and left basal ganglia lacunar infarcts. Mild to moderate generalized volume loss. No hydrocephalus.     VISUALIZED ORBITS/SINUSES/MASTOIDS: No intraorbital abnormality. No paranasal sinus mucosal disease. No middle ear or mastoid effusion.    BONES/SOFT TISSUES: No acute abnormality.    HEAD CTA:  ANTERIOR CIRCULATION: There is a severe stenosis of the right clinoid and supraclinoid internal carotid artery. Occluded posterior division right M2 branch as seen on comparison MRI. Occluded right A2  segment. There are short segment reconstitution with   reocclusion again noted involving the A3 segment. The distal A4 and A5 segments are contrast opacified. Severe stenosis of the left supraclinoid internal carotid artery. Segmental high-grade stenosis of the distal left M1/proximal left M2 segment. No   aneurysm, or high flow vascular malformation. Standard [place] anatomy.    POSTERIOR CIRCULATION: There is no appreciable contrast enhancement of the left vertebral artery which is concerning for proximal stenosis or vertebral artery occlusion. Atherosclerotic plaque results in irregularity the right vertebral artery and   moderate stenosis. The focal irregularity and narrowing of the bilateral posterior cerebral arteries is compatible with intracranial atheromatous disease.    DURAL VENOUS SINUSES: Expected enhancement of the major dural venous sinuses.    NECK CTA:  RIGHT CAROTID: No measurable stenosis or dissection.    LEFT CAROTID: Irregular, ulcerated appearing atherosclerotic plaque results in 50-70% stenosis in the left ICA. No dissection.     VERTEBRAL ARTERIES: No focal stenosis or dissection. Dominant right and smaller left vertebral arteries. There is gradual decrease in contrast attenuation of the left vertebral artery most prominent at the V3 segment.    AORTIC ARCH: Classic aortic arch anatomy with no significant stenosis at the origin of the great vessels.    NONVASCULAR STRUCTURES: Unremarkable.      Impression    IMPRESSION:   HEAD CT:  1.  No acute intracranial hemorrhage, extra-axial collection, midline shift.  2.  Known acute to subacute lacunar infarcts in the left cerebral hemisphere are better appreciated on comparison MRI.  3.  Moderate chronic small vessel ischemic change and remote lacunar infarction as above.    HEAD CTA:   1.  Occluded superior division M2 origin on the right.  2.  Occluded right A2 segment with short segment reconstitution and reocclusion of the A3 segment.  Distal A4 and A5 segment normal.  3.  No appreciable flow in the right V4 segment concerning for proximal stenosis or occlusion.  4.  Severe stenosis of the right clinoid and supraclinoid internal carotid .  5.  Severe stenosis of the left supraclinoid internal carotid .  6.  Segmental high-grade stenosis of the distal left M1/proximal left M2 segment    NECK CTA:  1.  Gradual loss of contrast in the left vertebral artery with minimal contrast at the V3 segment and no appreciable contrast in the left V4 segment. Although no focal stenosis is seen findings are concerning for underlying severe stenosis or occlusion.  2.  Irregular ulcerative plaque of the left carotid bulb with approximately 50-70% stenosis by NASCET criteria.   US Carotid Bilateral     Status: None    Narrative    EXAM: US CAROTID BILATERAL  LOCATION: [facility]  DATE/TIME: 6/3/2023 7:45 PM CDT    INDICATION: Carotid stenosis. Cerebrovascular accident.  COMPARISON: The neck MRA from 06/03/2023.  TECHNIQUE: Duplex exam performed utilizing 2D gray-scale imaging, Doppler interrogation with color-flow and spectral waveform analysis. The percent diameter stenosis is determined using NASCET criteria and Society of Radiologists in Ultrasound Consensus   Criteria.    FINDINGS:    RIGHT: Mild plaque at the bifurcation. The peak systolic velocity in the ICA is less than 125 cm/sec, consistent with less than 50% stenosis. Normal velocities in the ECA. Antegrade flow within the vertebral artery.     LEFT: Mild plaque at the bifurcation. The peak systolic velocity in the ICA is less than 125 cm/sec, consistent with less than 50% stenosis. Normal velocities in the ECA. Antegrade flow within the vertebral artery.    VELOCITY CHART:  CCA   Right: 39/13 cm/s   Left: 52/19 cm/s  ICA   Right: 64/32 cm/s   Left: 45/14 cm/s  ECA   Right: 40/12 cm/s   Left: 63/6 cm/s  ICA/CCA PSV Ratio   Right: 1.7   Left: 0.9      Impression    IMPRESSION:  1.   Mild plaque formation, velocities consistent with less than 50% stenosis in the right internal carotid artery.  2.  Mild plaque formation, velocities consistent with less than 50% stenosis in the left internal carotid artery.  3.  Flow within the vertebral arteries is antegrade.   Basic metabolic panel     Status: Abnormal   Result Value Ref Range    Sodium 136 136 - 145 mmol/L    Potassium 4.1 3.4 - 5.3 mmol/L    Chloride 102 98 - 107 mmol/L    Carbon Dioxide (CO2) 23 22 - 29 mmol/L    Anion Gap 11 7 - 15 mmol/L    Urea Nitrogen 13.5 6.0 - 20.0 mg/dL    Creatinine 0.89 0.67 - 1.17 mg/dL    Calcium 9.4 8.6 - 10.0 mg/dL    Glucose 280 (H) 70 - 99 mg/dL    GFR Estimate >90 >60 mL/min/1.73m2   Magnesium     Status: Normal   Result Value Ref Range    Magnesium 2.1 1.7 - 2.3 mg/dL   TSH with free T4 reflex     Status: Normal   Result Value Ref Range    TSH 1.83 0.30 - 4.20 uIU/mL   Alcohol level blood     Status: Normal   Result Value Ref Range    Alcohol ethyl <0.01 <=0.01 g/dL   CBC with platelets and differential     Status: Abnormal   Result Value Ref Range    WBC Count 8.5 4.0 - 11.0 10e3/uL    RBC Count 4.22 (L) 4.40 - 5.90 10e6/uL    Hemoglobin 13.3 13.3 - 17.7 g/dL    Hematocrit 38.4 (L) 40.0 - 53.0 %    MCV 91 78 - 100 fL    MCH 31.5 26.5 - 33.0 pg    MCHC 34.6 31.5 - 36.5 g/dL    RDW 13.2 10.0 - 15.0 %    Platelet Count 244 150 - 450 10e3/uL    % Neutrophils 66 %    % Lymphocytes 24 %    % Monocytes 7 %    % Eosinophils 2 %    % Basophils 1 %    % Immature Granulocytes 0 %    NRBCs per 100 WBC 0 <1 /100    Absolute Neutrophils 5.7 1.6 - 8.3 10e3/uL    Absolute Lymphocytes 2.0 0.8 - 5.3 10e3/uL    Absolute Monocytes 0.6 0.0 - 1.3 10e3/uL    Absolute Eosinophils 0.2 0.0 - 0.7 10e3/uL    Absolute Basophils 0.0 0.0 - 0.2 10e3/uL    Absolute Immature Granulocytes 0.0 <=0.4 10e3/uL    Absolute NRBCs 0.0 10e3/uL   CBC with platelets differential     Status: Abnormal    Narrative    The following orders were created  for panel order CBC with platelets differential.  Procedure                               Abnormality         Status                     ---------                               -----------         ------                     CBC with platelets and d...[[id number]]  Abnormal            Final result                 Please view results for these tests on the individual orders.     Medications - No data to display  Labs Ordered and Resulted from Time of ED Arrival to Time of ED Departure - No data to display  No orders to display       [employer] Stroke Scale  Exam Interval: upon presentation   Score    Level of consciousness: (0)   Alert, keenly responsive    LOC questions: (0)   Answers both questions correctly    LOC commands: (0)   Performs both tasks correctly    Best gaze: (0)   Normal    Visual: (0)   No visual loss    Facial palsy: (0)   Normal symmetrical movements    Motor arm (left): (0)   No drift    Motor arm (right): (0)   No drift    Motor leg (left): (0)   No drift    Motor leg (right): (0)   No drift    Limb ataxia: (0)   Absent    Sensory: (0)   Normal- no sensory loss    Best language: (1)   Mild to moderate aphasia    Dysarthria: (1)   Mild to moderate dysarthria    Extinction and inattention: (0)   No abnormality        Total Score:  2         Critical care was not performed.     Medical Decision Making  The patient's presentation was of high complexity (an acute health issue posing potential threat to life or bodily function).    The patient's evaluation involved:  Review of external notes.  Review of external labs.  Discussion with the neurology stroke team.  Review of labs from today and EKG from today.    The patient's management necessitated high risk (a decision regarding hospitalization).      Assessment & Plan    52-year-old male who was transferred here due to needing neurology and neurosurgery intervention.  Case was discussed with stroke neurology team who recommends  admission to their service.  They said that they would contact neurosurgery for further treatment.  Patient will be admitted to the neurology service for further care.    This part of the medical record was transcribed by Judit Singh, Medical Scribe, from a dictation done by Sandra Rosen MD.     I have reviewed the nursing notes. I have reviewed the findings, diagnosis, plan and need for follow up with the patient.    New Prescriptions    No medications on file       Final diagnoses:   Cerebrovascular accident (CVA), unspecified mechanism (H)       I, Joshua De Jesus, am serving as a trained medical scribe to document services personally performed by Sandra Rosen MD based on the provider's statements to me on Rocio 3, 2023.  This document has been checked and approved by the attending provider.    I, Sandra Rosen MD, was physically present and have reviewed and verified the accuracy of this note documented by Joshua De Jesus medical scribe.      Sandra Rosen MD  [facility] EMERGENCY DEPARTMENT  6/3/2023     Sandra Rosen MD  06/04/23 0047

## 2023-06-04 NOTE — PROGRESS NOTES
"   06/04/23 1034   Appointment Info   Signing Clinician's Name / Credentials (OT) Melissa Hernandez, OTR/L       Present yes   Language Hmong  (iPad )   Living Environment   People in Home alone   Current Living Arrangements apartment   Home Accessibility no concerns  (elevator access)   Transportation Anticipated family or friend will provide  (sister)   Living Environment Comments Pt lives in apartment on 7th floor, all needs met on main level. Elevator access, no DEBBIE. Pt sister is PCA 6 hrs/day, 7 days/week. Sister reports she can provide 24/7 assist if needed. Has tub/shower with chair, no grab bars   Self-Care   Usual Activity Tolerance good   Current Activity Tolerance good   Regular Exercise No   Equipment Currently Used at Home cane, straight   Fall history within last six months yes   Number of times patient has fallen within last six months 1   Activity/Exercise/Self-Care Comment Pt receives assistance with dressing and bathing from PCA is otherwise IND in ADLs prior to admission. Uses cane for mobility at baseline.   Instrumental Activities of Daily Living (IADL)   IADL Comments PCA provides assistance with all IADLs except med management, pt completes IND.   General Information   Onset of Illness/Injury or Date of Surgery 06/03/23   Referring Physician Thelma Jackson MD   Patient/Family Therapy Goal Statement (OT) Return home   Additional Occupational Profile Info/Pertinent History of Current Problem Per EMR, \"52 year old right handed male with a PMH of HTN, DMII, HLD, 20 pack year history, aortic dissection s/p repair in 2018, who presented to OSH with a 1 day history of tongue numbness and muffled speech. He underwent a stroke workup which demonstrated left head of caudate, corona radiata and basal ganglia acute/subacute strokes, with multiple areas of intracranial stenosis in anterior and posterior circulation as well as ulcerated plaque in left carotid bifurcation with " "50-70% stenosis. He currently endorses mild chest pain.\"   Existing Precautions/Restrictions fall   Left Upper Extremity (Weight-bearing Status) full weight-bearing (FWB)   Right Upper Extremity (Weight-bearing Status) full weight-bearing (FWB)   Left Lower Extremity (Weight-bearing Status) full weight-bearing (FWB)   Right Lower Extremity (Weight-bearing Status) full weight-bearing (FWB)   General Observations and Info Activity: up with assist   Cognitive Status Examination   Orientation Status orientation to person, place and time   Cognitive Status Comments Pt reports no changes in cognition, A&Ox4   Visual Perception   Visual Impairment/Limitations WNL;corrective lenses for reading   Impact of Vision Impairment on Function (Vision) tracking/scanning appears intact   Sensory   Sensory Quick Adds sensation intact   Pain Assessment   Patient Currently in Pain No   Posture   Posture forward head position;protracted shoulders   Range of Motion Comprehensive   General Range of Motion no range of motion deficits identified   Strength Comprehensive (MMT)   General Manual Muscle Testing (MMT) Assessment no strength deficits identified   Coordination   Coordination Comments Appears WFL, FMC opening items appropriately. Dec coordination w finger to nose assessment BUE   Bed Mobility   Comment (Bed Mobility) Not assessed, pt in chair   Transfers   Transfers sit-stand transfer;toilet transfer   Transfer Comments CGA w cane   Sit-Stand Transfer   Sit-Stand [place] (Transfers) supervision   Assistive Device (Sit-Stand Transfers) cane, straight   Toilet Transfer   Type (Toilet Transfer) sit-stand;stand-sit   [place] Level (Toilet Transfer) supervision   Assistive Device (Toilet Transfer) cane, straight   Balance   Balance Assessment standing balance: dynamic   Balance Comments CGA   Activities of Daily Living   BADL Assessment/Intervention grooming;toileting   Grooming Assessment/Training   [place] Level " (Grooming) supervision   Comment, (Grooming) standing sink side grooming tasks   Toileting   [place] Level (Toileting) supervision   Clinical Impression   Criteria for Skilled Therapeutic Interventions Met (OT) Yes, treatment indicated   OT Diagnosis Dec IND in I/ADLs   OT Problem List-Impairments impacting ADL problems related to;activity tolerance impaired   Assessment of Occupational Performance 1-3 Performance Deficits   Identified Performance Deficits ax tolerance for functional mobility/transfers   Planned Therapy Interventions (OT) ADL retraining;progressive activity/exercise;risk factor education;home program guidelines   Clinical Decision Making Complexity (OT) low complexity   Anticipated Equipment Needs Upon Discharge (OT)   (TBD)   Risk & Benefits of therapy have been explained evaluation/treatment results reviewed;care plan/treatment goals reviewed;risks/benefits reviewed;current/potential barriers reviewed;participants voiced agreement with care plan;participants included;patient;sibling   Clinical Impression Comments Pt appears near functional baseline. Limited by HR, BP, and overall ax tolerance. Would benefit from continued OT services to promote safety and IND in ADL   OT Total Evaluation Time   OT Eval, Low Complexity Minutes (62449) 8   OT Discharge Planning   OT Plan EC techniques, standing g/h tasks, functional mobility, ax tolerance, monitor HR/BP   OT Discharge Recommendation (DC Rec) home with assist   OT Rationale for DC Rec Pt appears near functional baseline. Limited by ax tolerance and BP. Pt sister is PCA 6 hrs/day, 7 days/week. Can provide 24/7 assist prn. Anticipate pt will be safe for return home with assist once medically ready. May benefit from OP PT to address BLE weakness.   OT Brief overview of current status SBA-CGA w cane   Total Session Time   Timed Code Treatment Minutes 41   Total Session Time (sum of timed and untimed services) 49

## 2023-06-04 NOTE — PROGRESS NOTES
Neurocritical Care Progress Note    Reason for critical care admission: Stroke overflow  Admitting Team: Stroke   Date of Service:  06/04/2023  Date of Admission:  6/3/2023  Hospital Day: 2    Assessment/Plan  Tiffany Lenz is a 52 year old male with a past medical history including hypertension, hyperlipidemia, type II DM, EVAR in 2018, and infrarenal aortic aneurysm who presented to [facility] ED on 6/3/2023 for evaluation and management of left sided tongue numbness and slurred speech with onset approximately 28 hours prior to arrival. NIHSS 0. MRI revealed a 4 mm focus of acute/subacute infarct in the left caudate head with several small foci of acute/subacute infarcts in the left corona radiata and basal ganglia. MRA revealed severe bilateral intracranial atherosclerosis, Right M2 Occlusion, and ulcerated left ICA plaque with 50% stenosis. He was outside the window for IV thrombolysis and did not meet criteria for endovascular treatment. He was deemed suitable for transfer to [facility] for Stroke care and consideration of CEA. He was admitted to 4A Neuro ICU as overflow.     Neuro  #Stroke, left caudate, left corona radiata, left basal ganglia  #Intracranial atherosclerosis   #ICA stenosis, 50% bilateral with left ulcerated plaque  - No indication for CEA at this time per NSGY   - By velocity criteria he does not meet criteria for CEA   - Neurochecks every 4 hours  - Permissive HTN; labetalol PRN for SBP > 200  - Avoid hypotonic IV fluids  - Daily aspirin 325 mg for secondary stroke prevention  - Plavix (clopidogrel) 75 mg PO Daily, duration to be determined  - Statin: Lipitor 80 mg  - Telemetry, EKG  - Bedside Glucose Monitoring  - A1c, Lipid Panel, Troponin x 3  - Rehab (PT/OT/SLP) services NOT required due to lack of ongoing deficit  - Stroke Education  - Depression Screen  - Apnea Screen  - Euthermia, Euglycemia    #Analgesics & sedation  RASS goal: 0 to -1  -PRN Tylenol     #Depression  #Concern for cognitive  impairment  Mr.  tells me his family (mom and brothers) have  and he has no family. His sister is listed as PCA and it appears he lives alone with family support. EHR has multiple concerns for un-intentional medication non-adherence, leaving the hospital AMA. He does not work, he does not drive. His primary clinic is [facility]. No formal cognitive testing is available.   -Continue home Prozac   -Medication non-adherence will continue to be an issue as long as Mr. Her lives alone and is responsible for his own medication  -Discuss discharge plan with family, PCA sister with      #Tobacco use disorder, current  #History of alcohol use disorder  -Nicotine patch  -Encourage cessation     CV  #Hypertension, treated  #Status post EVAR for abdominal aortic dissection  #Infrarenal AAA, 5.4 cm   #HFrEF, new  -TTE,  - EF 45-50%, inferior wall hypokinesis, anteroseptal wall hypokinesis, normal RV, dilation of the ascending aorta 4.0 cm   -No signs of decompensated HF  -Cardiac monitoring  -SBP goal < 200 mmHg, DBP goal < 105  -PRN Labetalol and Hydralazine  -Hold home lisinopril 40 mg daily, initiate lisinopril 10 mg daily    Resp  #SUHA  Oxygen/vent: room air   -Continuous pulse ox  -Maintain O2 saturations greater than 92%    GI  #Overweight  Diet: Regular diet   Last BM: PTA  GI prophylaxis: not indicated   -Bowel regimen: scheduled senna-docusate and Miralax    Renal/  #SUHA  -Daily BMP  -IV fluids: saline lock   -Electrolyte replacement protocol    Endo  #Type II DM, not on long term insulin   -Hgb A1c 9.6%  -TSH 1.83   -Monitor glucose levels  -Resume home metformin, hold home glipizide   -High dose correction scale     Heme  #SUHA  -Daily CBC  -Hgb goal >7, plt goal >50k  -Transfuse to meet Hgb and plt goals    ID  #SUHA  -Daily CBC  -Follow temperature curve    ICU Checklist  Lines/tubes/drains: PIV  FEN: saline lock, repletion protocols, regular diet   PPX: DVT - SCDs, SQH; GI - not  indicated.  Code: Full Code   Dispo: 6A when bed is available     TIME SPENT ON THIS ENCOUNTER  I spent 70 minutes of time on the unit/floor managing the care of Tiffany Her excluding time performing procedures. Upon evaluation, this patient had a high probability of imminent or life-threatening deterioration due to stroke, which required my direct attention, intervention, and personal management. Greater than 50% of my time was spent at the bedside counseling the patient and/or coordinating care including chart review, history, exam, documentation, and further activities per this note. I have personally reviewed the following data/imaging over the past 24 hours.     The patient was seen and discussed with the NCC attending, Dr. Dobbins.    Stella Gutierrez, APRN, CNP  Neurocritical Care *[id number]    24 Hour Vital Signs Summary:  Temp: 98.5  F (36.9  C) Temp  Min: 97.9  F (36.6  C)  Max: 98.7  F (37.1  C)  Resp: 16 Resp  Min: 14  Max: 20  SpO2: 98 % SpO2  Min: 91 %  Max: 100 %  Pulse: 98 Pulse  Min: 87  Max: 105    No data recorded  BP: (!) 143/108 Systolic (24hrs), Av , Min:143 , Max:223   Diastolic (24hrs), Av, Min:108, Max:147      Respiratory monitoring:   Resp: 16      No intake/output data recorded.    Current Medications:    aspirin  325 mg Oral or Feeding Tube Daily     atorvastatin  80 mg Oral or Feeding Tube QPM     clopidogrel  75 mg Oral or Feeding Tube Daily     FLUoxetine  60 mg Oral Daily     heparin ANTICOAGULANT  5,000 Units Subcutaneous Q8H AFSANEH     insulin aspart  1-7 Units Subcutaneous TID AC     insulin aspart  1-5 Units Subcutaneous At Bedtime     lisinopril  5 mg Oral Daily     [Held by provider] metFORMIN  2,000 mg Oral Daily with supper     nicotine  1 patch Transdermal Daily     nicotine   Transdermal Q8H     polyethylene glycol  17 g Oral Daily     senna-docusate  1 tablet Oral At Bedtime     sodium chloride (PF)  3 mL Intracatheter Q8H     sodium chloride bacteriostatic  12 mL Intravenous  "Once       PRN Medications:  acetaminophen, glucose **OR** dextrose **OR** glucagon, labetalol **OR** hydrALAZINE, lidocaine 4%, lidocaine (buffered or not buffered), - MEDICATION INSTRUCTIONS -, - MEDICATION INSTRUCTIONS -, sodium chloride (PF)    Infusions:    - MEDICATION INSTRUCTIONS -       - MEDICATION INSTRUCTIONS -         No Known Allergies    Physical Examination:  Vitals: BP (!) 143/108 (BP Location: Right arm)   Pulse 98   Temp 98.5  F (36.9  C) (Oral)   Resp 16   Ht 1.575 m (5' 2.01\")   Wt 65.8 kg (145 lb 1 oz)   SpO2 98%   BMI 26.53 kg/m    General: Adult male patient, sitting in chair.   HEENT: Normocephalic, atraumatic.  Cardiac: He appears adequately perfused.   Pulm: He is not hypoxic, no tachypnea.   Abdomen: Non-distended.   Extremities: Distal extremities do not appear acutely threatened.   Skin: No obvious rashes or lesions on exposed skin.   Psych: Calm and cooperative.  Neuro:  Mental status: Awake, alert, attentive. Oriented x 4.   Cranial nerves: PERRL, face is symmetric.   Motor: Normal bulk and tone. No abnormal movements. 5/5 strength in 4/4 extremities.   Sensory: Deferred.   Coordination: Deferred.   Gait: Deferred.    Labs and Imaging:    Results for orders placed or performed during the hospital encounter of 06/03/23 (from the past 24 hour(s))   EKG 12-lead, tracing only   Result Value Ref Range    Systolic Blood Pressure  mmHg    Diastolic Blood Pressure  mmHg    Ventricular Rate 94 BPM    Atrial Rate 94 BPM    SC Interval 176 ms    QRS Duration 86 ms     ms    QTc 512 ms    P Axis 24 degrees    R AXIS -7 degrees    T Axis 64 degrees    Interpretation ECG       Sinus rhythm  Minimal voltage criteria for LVH, may be normal variant  Nonspecific T wave abnormality  Prolonged QT  Abnormal ECG     Troponin T, High Sensitivity   Result Value Ref Range    Troponin T, High Sensitivity 51 (H) <=22 ng/L   ABO/Rh type and screen    Narrative    The following orders were created " for panel order ABO/Rh type and screen.  Procedure                               Abnormality         Status                     ---------                               -----------         ------                     Adult Type and Screen[[id number]]                            Final result                 Please view results for these tests on the individual orders.   Adult Type and Screen   Result Value Ref Range    ABO/RH(D) B POS     Antibody Screen Negative Negative    SPECIMEN EXPIRATION DATE [id number]    Lipid panel reflex to direct LDL: Non-fasting   Result Value Ref Range    Cholesterol 185 <200 mg/dL    Triglycerides 219 (H) <150 mg/dL    Direct Measure HDL 29 (L) >=40 mg/dL    LDL Cholesterol Calculated 112 (H) <=100 mg/dL    Non HDL Cholesterol 156 (H) <130 mg/dL    Narrative    Cholesterol  Desirable:  <200 mg/dL    Triglycerides  Normal:  Less than 150 mg/dL  Borderline High:  150-199 mg/dL  High:  200-499 mg/dL  Very High:  Greater than or equal to 500 mg/dL    Direct Measure HDL  Female:  Greater than or equal to 50 mg/dL   Male:  Greater than or equal to 40 mg/dL    LDL Cholesterol  Desirable:  <100mg/dL  Above Desirable:  100-129 mg/dL   Borderline High:  130-159 mg/dL   High:  160-189 mg/dL   Very High:  >= 190 mg/dL    Non HDL Cholesterol  Desirable:  130 mg/dL  Above Desirable:  130-159 mg/dL  Borderline High:  160-189 mg/dL  High:  190-219 mg/dL  Very High:  Greater than or equal to 220 mg/dL   Glucose by meter   Result Value Ref Range    GLUCOSE BY METER POCT 152 (H) 70 - 99 mg/dL   Asymptomatic COVID-19 Virus (Coronavirus) by PCR Nasopharyngeal    Specimen: Nasopharyngeal; Swab   Result Value Ref Range    SARS CoV2 PCR Negative Negative    Narrative    Testing was performed using the Xpert Xpress SARS-CoV-2 Assay on the Cepheid Gene-Xpert Instrument Systems. Additional information about this Emergency Use Authorization (EUA) assay can be found via the Lab Guide. This test should be ordered  for the detection of SARS-CoV-2 in individuals who meet SARS-CoV-2 clinical and/or epidemiological criteria as well as from individuals without symptoms or other reasons to suspect COVID-19. Test performance for asymptomatic patients has only been established in anterior nasal swab specimens. This test is for in vitro diagnostic use under the FDA EUA for laboratories certified under CLIA to perform high complexity testing. This test has not been FDA cleared or approved. A negative result does not rule out the presence of PCR inhibitors in the specimen or target RNA concentration below the limit of detection for the assay. The possibility of a false negative should be considered if the patient's recent exposure or clinical presentation suggests COVID-19. This test was validated by [facility] [employer]. These Laboratories are certified under the Clinical Laboratory Improvement Amendments (CLIA) as qualified to perform high complexity testing.     Troponin T, High Sensitivity   Result Value Ref Range    Troponin T, High Sensitivity 56 (H) <=22 ng/L   Glucose by meter   Result Value Ref Range    GLUCOSE BY METER POCT 189 (H) 70 - 99 mg/dL   Basic metabolic panel   Result Value Ref Range    Sodium 139 136 - 145 mmol/L    Potassium 3.9 3.4 - 5.3 mmol/L    Chloride 107 98 - 107 mmol/L    Carbon Dioxide (CO2) 19 (L) 22 - 29 mmol/L    Anion Gap 13 7 - 15 mmol/L    Urea Nitrogen 11.9 6.0 - 20.0 mg/dL    Creatinine 0.93 0.67 - 1.17 mg/dL    Calcium 9.1 8.6 - 10.0 mg/dL    Glucose 186 (H) 70 - 99 mg/dL    GFR Estimate >90 >60 mL/min/1.73m2   Magnesium   Result Value Ref Range    Magnesium 2.0 1.7 - 2.3 mg/dL   Phosphorus   Result Value Ref Range    Phosphorus 3.2 2.5 - 4.5 mg/dL   Troponin T, High Sensitivity   Result Value Ref Range    Troponin T, High Sensitivity 49 (H) <=22 ng/L   CBC with platelets   Result Value Ref Range    WBC Count 9.2 4.0 - 11.0 10e3/uL    RBC Count 4.40 4.40 - 5.90 10e6/uL    Hemoglobin 13.5  13.3 - 17.7 g/dL    Hematocrit 41.0 40.0 - 53.0 %    MCV 93 78 - 100 fL    MCH 30.7 26.5 - 33.0 pg    MCHC 32.9 31.5 - 36.5 g/dL    RDW 13.2 10.0 - 15.0 %    Platelet Count 210 150 - 450 10e3/uL   Hemoglobin A1c   Result Value Ref Range    Hemoglobin A1C 9.6 (H) <5.7 %   Echocardiogram Complete w Bubble Study - For age < 60 yrs   Result Value Ref Range    LVEF  45-50% (mildly reduced)     [facility]    [id number]  [id number]  [id number]  [id number]^KASSY^[facility]ARDSt[facility],[place]  Echocardiography Laboratory  [address] [place] [address]     Name: ARMANI CHRISTY  MRN: [id number]  : 1970  Study Date: 2023 08:36 AM  Age: 52 yrs  Gender: Male  Patient Location: [facility]  Reason For Study: Cerebrovascular Incident  Ordering Physician: AMRIK ELENA  Referring Physician: BERTHA BHARDWAJ  Performed By: Hannah Ames     BSA: 1.7 m2  Height: 62 in  Weight: 145 lb  HR: 96  BP: 143/108 mmHg  ______________________________________________________________________________  Procedure  Bubble Echocardiogram with two-dimensional, color and spectral Doppler  performed. Contrast Optison. Optison (NDC #2610-7794-47) given intravenously.  Patient was given 6 ml mixture of 3 ml Optison and 6 ml saline. 3 ml wasted.  ______________________________________________________________________________  Interpretation Summary  Left ventricular function is decreased. The ejection fraction is 45-50%  (mildly reduced).  Inferior wall hypokinesis is present.  Anteroseptal wall hypokinesis is present.  The right ventricle is normal size.  Global right ventricular function is normal.  Mild dilatation of the aorta is present.Ascending aorta 4.0 cm.  No significant valvular abnormalities present.     Previous study not available for comparison.  ______________________________________________________________________________  Left Ventricle  Left ventricular size is normal. Thickening of the anterobasal septum  is  present. Left ventricular diastolic function is normal. Left ventricular  function is decreased. The ejection fraction is 45-50% (mildly reduced).  Inferior wall hypokinesis is present. Anteroseptal wall hypokinesis is  present.     Right Ventricle  The right ventricle is normal size. Global right ventricular function is  normal.     Atria  The right atria appears normal. Mild left atrial enlargement is present. There  was no shunt at the atrial septal level as assessed by agitated saline bubble  study at rest and with Valsalva maneuver.     Mitral Valve  The mitral valve is normal.     Aortic Valve  The valve leaflets are not well visualized. Trace aortic insufficiency is  present.     Tricuspid Valve  The tricuspid valve is normal. Trace tricuspid insufficiency is present. The  peak velocity of the tricuspid regurgitant jet is not obtainable. Pulmonary  artery systolic pressure cannot be assessed.     Pulmonic Valve  On Doppler interrogation, there is no significant stenosis or regurgitation.     Vessels  The aorta root is normal. The inferior vena cava was normal in size with  preserved respiratory variability. Mild dilatation of the aorta is present.  Ascending aorta 4.0 cm. IVC diameter <2.1 cm collapsing >50% with sniff  suggests a normal RA pressure of 3 mmHg.     Pericardium  No pericardial effusion is present.     Miscellaneous  No significant valvular abnormalities present.     Compared to Previous Study  Previous study not available for comparison.  ______________________________________________________________________________  MMode/2D Measurements & Calculations  IVSd: 1.3 cm  LVIDd: 3.7 cm  LVIDs: 3.3 cm  LVPWd: 0.94 cm  FS: 8.7 %  LV mass(C)d: 127.6 grams  LV mass(C)dI: 76.5 grams/m2  asc Aorta Diam: 4.0 cm  RWT: 0.51  TAPSE: 1.9 cm     Doppler Measurements & Calculations  RV S Wallace: 10.9 cm/sec     ______________________________________________________________________________  Report approved by:  Benito MENDEZ 06/04/2023 10:51 AM             All relevant imaging and laboratory values personally reviewed.

## 2023-06-04 NOTE — ED TRIAGE NOTES
Transfer from [facility] for tongue numbness and occlusions found on imaging      Triage Assessment     Row Name 06/03/23 2018       Triage Assessment (Adult)    Airway WDL WDL       Respiratory WDL    Respiratory WDL WDL       Skin Circulation/Temperature WDL    Skin Circulation/Temperature WDL WDL       Cardiac WDL    Cardiac WDL WDL       Peripheral/Neurovascular WDL    Peripheral Neurovascular WDL WDL       Cognitive/Neuro/Behavioral WDL    Cognitive/Neuro/Behavioral WDL WDL       [place] Coma Scale    Best Eye Response 4-->(E4) spontaneous    Best Motor Response 6-->(M6) obeys commands    Best Verbal Response 5-->(V5) oriented    [place] Coma Scale Score 15

## 2023-06-04 NOTE — PROGRESS NOTES
[facility]   Clinical Swallow Evaluation   06/04/23 0820   Appointment Info   Signing Clinician's Name / Credentials (SLP) Luana Villarreal MS CCC SLP   General Information   Onset of Illness/Injury or Date of Surgery 06/03/23   Referring Physician Thelma Jackson MD   Patient/Family Therapy Goal Statement (SLP) None stated   Pertinent History of Current Problem Per provider documentation - Tiffany Lenz is a 52 year old Hmong speaking male with history of HTN, hyperlipidemia, DM 2, endovascular repair aneurysm abdominal aorta (2018) and aortic dissection presented to [facility] ED with left tongue numbness and slurred speech that started yesterday morning. NIHSS 0. Head MRI revealed 4 mm focus of acute/subacute infarct in the left caudate head and everal small foci of acute/subacute infarcts in the left corona radiata and basal ganglia. MRA revealed severe b/l intracranial atherosclerosis, Right M2 Occlusion and also ulcerated left ICA plaque with %50 stenosis. Per discussion with [facility] stroke attending patient was loaded with  mg and Plavix 300 mg and transferred to [facility] for consideration of CEA by neurosurgery service.   General Observations Pt is alert and agreeable, he is seated in a chair       Present yes   Language Hmong, on ipad   Type of Evaluation   Type of Evaluation Swallow Evaluation   Oral Motor   Oral Musculature generally intact   Structural Abnormalities none present   Mucosal Quality adequate   Dentition (Oral Motor)   Dentition (Oral Motor) significant number of missing teeth;dentition impacts chewing ability   Facial Symmetry (Oral Motor)   Facial Symmetry (Oral Motor) WNL   Lip Function (Oral Motor)   Lip Range of Motion (Oral Motor) WNL   Tongue Function (Oral Motor)   Tongue ROM (Oral Motor) WNL   Vocal Quality/Secretion Management (Oral Motor)   Vocal Quality (Oral Motor) WFL   Secretion Management (Oral Motor) WNL   Comment, Vocal  Quality/Secretion Management (Oral Motor)  notes some dysarthria but reports he is intelligibile   General Swallowing Observations   Past History of Dysphagia None reported by pt or upon review of EMR   Respiratory Support (General Swallowing Observations) none   Current Diet/Method of Nutritional Intake (General Swallowing Observations, NIS) NPO   Swallowing Evaluation Clinical swallow evaluation   Clinical Swallow Evaluation   Feeding Assistance minimal assistance required   Clinical Swallow Evaluation Textures Trialed thin liquids;pureed;solid foods   Clinical Swallow Eval: Thin Liquid Texture Trial   Mode of Presentation, Thin Liquids straw;self-fed   Volume of Liquid or Food Presented 6 oz   Oral Phase of Swallow WFL   Pharyngeal Phase of Swallow intact   Diagnostic Statement Overt cough on initial consecutive straw sips of thin liquids. A verbal cues was provided to take small sips and pt implemented this strategy during the remainder of the evaluation and consumed thin liquids by straw without overt s/sx of aspiration   Clinical Swallow Evaluation: Puree Solid Texture Trial   Mode of Presentation, Puree spoon;self-fed   Volume of Puree Presented 4 oz   Oral Phase, Puree WFL   Pharyngeal Phase, Puree intact   Diagnostic Statement No overt s/sx of aspiration   Clinical Swallow Evaluation: Solid Food Texture Trial   Mode of Presentation self-fed   Volume Presented 1 cracker   Oral Phase WFL   Pharyngeal Phase intact   Diagnostic Statement Prolonged oral manipulation and clearance, no overt s/sx of aspiration. But extra time needed to fully clear oral cavity   Esophageal Phase of Swallow   Patient reports or presents with symptoms of esophageal dysphagia No   Swallowing Recommendations   Diet Consistency Recommendations easy to chew (level 7);thin liquids (level 0)   Supervision Level for Intake distant supervision needed   Mode of Delivery Recommendations bolus size, small;food moistened;slow rate of  intake   Swallowing Maneuver Recommendations alternate food and liquid intake   Monitoring/Assistance Required (Eating/Swallowing) check mouth frequently for oral residue/pocketing;cue for finger/lingual sweep if oral pocketing present;stop eating activities when fatigue is present;monitor for cough or change in vocal quality with intake   Recommended Feeding/Eating Techniques (Swallow Eval) maintain upright sitting position for eating;minimize distractions during oral intake   Medication Administration Recommendations, Swallowing (SLP) As per pt preference   Instrumental Assessment Recommendations instrumental evaluation not recommended at this time   General Therapy Interventions   Planned Therapy Interventions Dysphagia Treatment   Dysphagia treatment Instruction of safe swallow strategies;Modified diet education   Clinical Impression   Criteria for Skilled Therapeutic Interventions Met (SLP Eval) Yes, treatment indicated   SLP Diagnosis Oropharyngeal dysphagia   Risks & Benefits of therapy have been explained evaluation/treatment results reviewed;care plan/treatment goals reviewed;participants voiced agreement with care plan;participants included;patient   Clinical Impression Comments Pt seen for clinical swallow evaluation with an  present on the ipad. Pt had initial cough with thin liquids when taking consecutive straw sips. Initial, single sips consumed without overt s/sx of aspiration. Pt tolerated solids without s/sx of aspiration but mastication and oral clearance was prolonged given significant missing dentition. Mild oropharyngeal dysphagia. Recommend easy to chew diet and thin liquids - upright position, slow rate, single sips, and small bites. Initiate SLP services for dysphagia.   SLP Total Evaluation Time   Eval: oral/pharyngeal swallow function, clinical swallow Minutes ([id number]) [phone]

## 2023-06-04 NOTE — PLAN OF CARE
Major Shift Events:  Hmong speaking. Alert and oriented. Slurred speech per interpretor. Missing some words when repeating back phrases. Denies numbness in tongue but describes it as heavy feeling. Moves all extremities, 5/5 strength. Up x SBA. PERRLA intact. Afebrile. Sinus/sinus tach. HR . Hypertensive, SBP <200. No intervention needed. C/o chest pain since Friday, Team aware. EKG in ED, trop levels drawn. RA, 2L NC when sleeping/snoring. Strict NPO until speech eval.   Plan: Monitor for neuro changes. Possible workup for surgical intervention. Monitor and treat per plan of care.   For vital signs and complete assessments, please see documentation flowsheets.

## 2023-06-04 NOTE — PLAN OF CARE
Admitted/transferred from: ED   Reason for admission/transfer: Post CVA monitoring, 6A overflow  2 RN skin assessment: completed by Samra OVERTON and Binta OVERTON  Result of skin assessment and interventions/actions: No areas of concern  Height, weight, drug calc weight: Done  Patient belongings (see Flowsheet)  MDRO education added to care planN/A  ?

## 2023-06-04 NOTE — CONSULTS
[facility]       NEUROSURGERY CONSULTATION NOTE    This consultation was requested by Dr. Ruggiero from the Stroke service.    Reason for Consultation: Carotid Stenosis        HPI: Tiffany Lenz is a 52 year old right handed man with a PMH of HTN, DMII, HLD, 20 pack year history, aortic dissection s/p repair in 2018, who presented to OSH with a 1 day history of tongue numbness and muffled speech. He underwent a stroke workup which demonstrated left head of caudate, corona radiata and basal ganglia acute/subacute strokes, with multiple areas of intracranial stenosis in anterior and posterior circulation as well as ulcerated plaque in left carotid bifurcation with 50-70% stenosis. He currently endorses mild chest pain.   Further HPI is limited by lack of available [facility] in person and telephone interpretors (30 minute wait without available interpreters)  He lives alone at home, does not work, denies any immediate family.         PAST MEDICAL HISTORY:   Past Medical History:   Diagnosis Date     Aortic dissection (H)      Depression      Diabetes mellitus type 2 in nonobese (H)      HLD (hyperlipidemia)      HTN (hypertension)      Pneumonia      Respiratory failure (H)        PAST SURGICAL HISTORY:   Past Surgical History:   Procedure Laterality Date     ENDOVASCULAR REPAIR ANEURYSM ABDOMINAL AORTA N/A 2/13/2018    Procedure: ENDOVASCULAR REPAIR ANEURYSM ABDOMINAL AORTA;  thoracic endovascular repair of aorta distal to left subclavian artery.  Intravascular ultrasouns of aortic arch, thoracic aorta, and abdominal aorta.  ultrasound guided vascular accessx2, bilateral lower extremity arterial duplex limited study, percutaneous closure of endovascular access x1.  aorta catheterization x1.;  Surgeon: Renata Monique       FAMILY HISTORY: No family history on file.    SOCIAL HISTORY:   Social History     Tobacco Use     Smoking status: Some Days     Smokeless tobacco: Never   Vaping  "Use     Vaping status: Not on file   Substance Use Topics     Alcohol use: No       MEDICATIONS:  Medications Prior to Admission   Medication Sig Dispense Refill Last Dose     glipiZIDE (GLUCOTROL XL) 10 MG 24 hr tablet Take 10 mg by mouth daily    at per patient, hasn't started but has been filling...     atorvastatin (LIPITOR) 80 MG tablet Take 80 mg by mouth every evening        FLUoxetine (PROZAC) 20 MG capsule Take 20 mg by mouth daily Along with 40mg for total 60mg daily        FLUoxetine (PROZAC) 40 MG capsule Take 40 mg by mouth daily Along with 20mg for total 60mg daily        lisinopril (ZESTRIL) 40 MG tablet Take 40 mg by mouth daily        metFORMIN (GLUCOPHAGE-XR) 500 MG 24 hr tablet Take 2,000 mg by mouth daily (with dinner)          Allergies:  No Known Allergies    ROS: Unable to obtain    Physical exam:   Blood pressure (!) 155/104, pulse 100, temperature 98.9  F (37.2  C), temperature source Oral, resp. rate 18, height 1.575 m (5' 2.01\"), weight 65.8 kg (145 lb 1 oz), SpO2 98 %.  CV: BP and HR as noted above  PULM: breathing comfortably on room air  ABD: soft, non-distended  NEUROLOGIC:  -- Awake; Alert; oriented x 3  -- Follows commands briskly  -- Unable to assess language due to inability to obtain , he understands and speaks basic English without obvious aphasia or dysarthria  -- no gaze preference. No apparent hemineglect.  Cranial Nerves:  -- PERRL 3-2mm bilat and brisk, extraocular movements intact  -- face symmetrical, left tongue deviation, tongue numbness  -- hearing grossly intact bilat    Motor:  Normal bulk / tone; no tremor, rigidity, or bradykinesia.  No muscle wasting or fasciculations  No Pronator Drift     Delt Bi Tri Hand Flexion/  Extension Iliopsoas Quadriceps Hamstrings Tibialis Anterior Gastroc    C5 C6 C7 C8/T1 L2 L3 L4-S1 L4 S1   R 5 5 5 5 5 5 5 5 5   L 5 5 5 5 5 5 5 5 5   Sensory:  unable to assess due to inability to obtain      Reflexes:     Bi Tri " BR Roshan Pat Ach Bab    C5-6 C7-8 C6 UMN L2-4 S1 UMN   R 2+ 2+ 2+ Norm 2+ 2+ Norm   L 2+ 2+ 2+ Norm 2+ 2+ Norm     Gait: Deferred      LABS:  Last Comprehensive Metabolic Panel:  Sodium   Date Value Ref Range Status   06/04/2023 139 136 - 145 mmol/L Final   02/14/2018 143 133 - 144 mmol/L Final     Potassium   Date Value Ref Range Status   06/04/2023 3.9 3.4 - 5.3 mmol/L Final   01/04/2022 4.0 3.5 - 5.0 mmol/L Final   02/14/2018 4.3 3.4 - 5.3 mmol/L Final     Chloride   Date Value Ref Range Status   06/04/2023 107 98 - 107 mmol/L Final   01/04/2022 107 98 - 107 mmol/L Final   02/14/2018 114 (H) 94 - 109 mmol/L Final     Carbon Dioxide   Date Value Ref Range Status   02/14/2018 21 20 - 32 mmol/L Final     Carbon Dioxide (CO2)   Date Value Ref Range Status   06/04/2023 19 (L) 22 - 29 mmol/L Final   01/04/2022 17 (L) 22 - 31 mmol/L Final     Anion Gap   Date Value Ref Range Status   06/04/2023 13 7 - 15 mmol/L Final   01/04/2022 14 5 - 18 mmol/L Final   02/14/2018 8 3 - 14 mmol/L Final     Glucose   Date Value Ref Range Status   01/04/2022 335 (H) 70 - 125 mg/dL Final   02/14/2018 124 (H) 70 - 99 mg/dL Final     GLUCOSE BY METER POCT   Date Value Ref Range Status   06/04/2023 203 (H) 70 - 99 mg/dL Final     Urea Nitrogen   Date Value Ref Range Status   06/04/2023 11.9 6.0 - 20.0 mg/dL Final   01/04/2022 14 8 - 22 mg/dL Final   02/14/2018 17 7 - 30 mg/dL Final     Creatinine   Date Value Ref Range Status   06/04/2023 0.93 0.67 - 1.17 mg/dL Final   02/14/2018 0.76 0.66 - 1.25 mg/dL Final     GFR Estimate   Date Value Ref Range Status   06/04/2023 >90 >60 mL/min/1.73m2 Final     Comment:     eGFR calculated using 2021 CKD-EPI equation.   02/25/2021 >60 >60 mL/min/1.73m2 Final   02/14/2018 >90 >60 mL/min/1.7m2 Final     Comment:     Non  GFR Calc     Calcium   Date Value Ref Range Status   06/04/2023 9.1 8.6 - 10.0 mg/dL Final   02/14/2018 8.4 (L) 8.5 - 10.1 mg/dL Final     Lab Results   Component Value Date     WBC 8.5 06/03/2023    WBC 8.6 02/14/2018     Lab Results   Component Value Date    RBC 4.22 06/03/2023    RBC 3.69 02/14/2018     Lab Results   Component Value Date    HGB 13.3 06/03/2023    HGB 11.4 02/14/2018     Lab Results   Component Value Date    HCT 38.4 06/03/2023    HCT 35.3 02/14/2018     Lab Results   Component Value Date    MCV 91 06/03/2023    MCV 96 02/14/2018     Lab Results   Component Value Date    MCH 31.5 06/03/2023    MCH 30.9 02/14/2018     Lab Results   Component Value Date    MCHC 34.6 06/03/2023    MCHC 32.3 02/14/2018     Lab Results   Component Value Date    RDW 13.2 06/03/2023    RDW 13.1 02/14/2018     Lab Results   Component Value Date     06/03/2023     02/14/2018           IMAGING:  Carotid US:  IMPRESSION:  1.  Mild plaque formation, velocities consistent with less than 50% stenosis in the right internal carotid artery.  2.  Mild plaque formation, velocities consistent with less than 50% stenosis in the left internal carotid artery.  3.  Flow within the vertebral arteries is antegrade.    CTA head/neck:  IMPRESSION:   HEAD CT:  1.  No acute intracranial hemorrhage, extra-axial collection, midline shift.  2.  Known acute to subacute lacunar infarcts in the left cerebral hemisphere are better appreciated on comparison MRI.  3.  Moderate chronic small vessel ischemic change and remote lacunar infarction as above.     HEAD CTA:   1.  Occluded superior division M2 origin on the right.  2.  Occluded right A2 segment with short segment reconstitution and reocclusion of the A3 segment. Distal A4 and A5 segment normal.  3.  No appreciable flow in the right V4 segment concerning for proximal stenosis or occlusion.  4.  Severe stenosis of the right clinoid and supraclinoid internal carotid .  5.  Severe stenosis of the left supraclinoid internal carotid .  6.  Segmental high-grade stenosis of the distal left M1/proximal left M2 segment     NECK CTA:  1.  Gradual loss of contrast  in the left vertebral artery with minimal contrast at the V3 segment and no appreciable contrast in the left V4 segment. Although no focal stenosis is seen findings are concerning for underlying severe stenosis or occlusion.  2.  Irregular ulcerative plaque of the left carotid bulb with approximately 50-70% stenosis by NASCET criteria.    IMPRESSION:  HEAD MRI:   1.  4 mm focus of acute/subacute infarct in the left caudate head. Several small foci of acute/subacute infarcts in the left corona radiata and basal ganglia with the largest foci measuring 5 mm.  2.  Redemonstration of small chronic hemosiderin staining in the lateral right cerebellar hemisphere.  3.  Multiple small chronic infarcts as detailed above.  4.  Mild diffuse cerebral parenchymal volume loss. Presumed chronic hypertensive/microvascular ischemic white matter changes.     HEAD MRA:   1.  Multiple high-grade narrowings of the intracranial arteries, presumably on atherosclerotic basis.  2.  Moderate narrowings of the supraclinoid internal carotid arteries bilaterally. Short segment high-grade narrowing of the proximal M2 segment of the left middle cerebral artery.   3.  Occluded M2 origin posterior division of the right middle cerebral artery.  4.  High-grade narrowing or occlusion of the intracranial and the intracranial left vertebral artery. Multifocal high-grade narrowings of the intracranial right vertebral artery.   5.  Moderate narrowing of the proximal basilar artery. Multifocal narrowings of the M2 and M3 segment of the left posterior cerebral artery. The right posterior cerebral artery is patent proximally with moderate narrowing of the distal P3 segment.     NECK MRA:  1.  Ulcerative plaque at the origin of the left internal carotid artery. Less than 50% narrowing of the proximal left internal carotid artery based on the NASCET criteria.  2.  No hemodynamically significant narrowing of the right internal carotid artery based on the NASCET  "criteria.  3.  Moderate narrowing of the origin of the dominant right vertebral artery. Otherwise both vertebral arteries are patent throughout their course in the neck.           ASSESSMENT: Tiffany Lenz is a 52 year old right handed male with small left head of caudate, corona radiata and basal ganglia acute/subacute strokes, with multiple areas of intracranial stenosis in anterior and posterior circulation as well as ulcerated plaque in left carotid bifurcation with 50-70% stenosis. He is not optimized medically and would benefit from medical optimization for stroke prevention (glucose control, HTN, smoking cessation, antiplatelet therapy). In addition, while he might have some benefit from revascularization, his anatomy makes carotid stenting technically challenging. His intracranial atherosclerosis and vasculopathy put him at risk for intraoperative strokes and MI during carotid endarterectomy.       RECOMMENDATIONS:  Ok to continue aspirin and plavix if deemed indicated by Stroke service  Cardiac workup to fully delineate surgical risk - consider echocardiogram and screening for CAD  Will further discuss whether CEA has more benefits than risks after workup is completed    The patient was discussed with Dr. Marshall, neurosurgery chief resident, and Dr. Cain, neurosurgery staff, and they agree with the above.    Donna Martinez MD  Neurosurgery Resident, PGY-3    Clinically Significant Risk Factors Present on Admission                  # Hypertension: Home medication list includes antihypertensive(s)     # DMII: A1C = 9.6 % (Ref range: <5.7 %) within past 6 months    # Overweight: Estimated body mass index is 26.53 kg/m  as calculated from the following:    Height as of this encounter: 1.575 m (5' 2.01\").    Weight as of this encounter: 65.8 kg (145 lb 1 oz).           Addendum: After discussion with Neurocritical Care and Neurology teams, consensus is that carotid endarterectomy is not in the patient's best " interest at this time due to significant intracranial atherosclerosis, cardiovascular disease, and uncontrolled diabetes. Endovascular stenting would be technically challenging due to his anatomy. The patient additionally prefers to avoid surgery. Recommend medical management for his atherosclerotic disease rather than invasive intervention.    Jenelle Beauchamp MD, PhD  PGY-1 Neurosurgery    Please contact neurosurgery resident on call with questions.    Dial * * [phone], enter [phone] when prompted.       I have reviewed the history. I have seen and examined the patient myself and agree with the assessment and plan above. Patient has moderate stenosis and the benefit of revascularization is modest. Since the risk of treatment is higher than typical, medical management is best.  CAPRICE Cain MD

## 2023-06-05 ENCOUNTER — APPOINTMENT (OUTPATIENT)
Dept: INTERPRETER SERVICES | Facility: CLINIC | Age: 53
End: 2023-06-05
Payer: COMMERCIAL

## 2023-06-05 ENCOUNTER — APPOINTMENT (OUTPATIENT)
Dept: OCCUPATIONAL THERAPY | Facility: CLINIC | Age: 53
End: 2023-06-05
Payer: COMMERCIAL

## 2023-06-05 ENCOUNTER — APPOINTMENT (OUTPATIENT)
Dept: SPEECH THERAPY | Facility: CLINIC | Age: 53
End: 2023-06-05
Payer: COMMERCIAL

## 2023-06-05 VITALS
RESPIRATION RATE: 16 BRPM | DIASTOLIC BLOOD PRESSURE: 96 MMHG | BODY MASS INDEX: 26.69 KG/M2 | SYSTOLIC BLOOD PRESSURE: 125 MMHG | WEIGHT: 145.06 LBS | OXYGEN SATURATION: 97 % | TEMPERATURE: 98.1 F | HEIGHT: 62 IN | HEART RATE: 90 BPM

## 2023-06-05 LAB
ANION GAP SERPL CALCULATED.3IONS-SCNC: 13 MMOL/L (ref 7–15)
ATRIAL RATE - MUSE: 94 BPM
BUN SERPL-MCNC: 17.3 MG/DL (ref 6–20)
CALCIUM SERPL-MCNC: 9.1 MG/DL (ref 8.6–10)
CHLORIDE SERPL-SCNC: 104 MMOL/L (ref 98–107)
CREAT SERPL-MCNC: 1.23 MG/DL (ref 0.67–1.17)
DEPRECATED HCO3 PLAS-SCNC: 19 MMOL/L (ref 22–29)
DIASTOLIC BLOOD PRESSURE - MUSE: NORMAL MMHG
ERYTHROCYTE [DISTWIDTH] IN BLOOD BY AUTOMATED COUNT: 13.1 % (ref 10–15)
GFR SERPL CREATININE-BSD FRML MDRD: 71 ML/MIN/1.73M2
GLUCOSE BLDC GLUCOMTR-MCNC: 165 MG/DL (ref 70–99)
GLUCOSE BLDC GLUCOMTR-MCNC: 247 MG/DL (ref 70–99)
GLUCOSE SERPL-MCNC: 169 MG/DL (ref 70–99)
HCT VFR BLD AUTO: 41.6 % (ref 40–53)
HGB BLD-MCNC: 13.5 G/DL (ref 13.3–17.7)
INTERPRETATION ECG - MUSE: NORMAL
MAGNESIUM SERPL-MCNC: 2.1 MG/DL (ref 1.7–2.3)
MCH RBC QN AUTO: 30.8 PG (ref 26.5–33)
MCHC RBC AUTO-ENTMCNC: 32.5 G/DL (ref 31.5–36.5)
MCV RBC AUTO: 95 FL (ref 78–100)
P AXIS - MUSE: 24 DEGREES
PHOSPHATE SERPL-MCNC: 3.9 MG/DL (ref 2.5–4.5)
PLATELET # BLD AUTO: 230 10E3/UL (ref 150–450)
POTASSIUM SERPL-SCNC: 3.9 MMOL/L (ref 3.4–5.3)
PR INTERVAL - MUSE: 176 MS
QRS DURATION - MUSE: 86 MS
QT - MUSE: 410 MS
QTC - MUSE: 512 MS
R AXIS - MUSE: -7 DEGREES
RBC # BLD AUTO: 4.39 10E6/UL (ref 4.4–5.9)
SODIUM SERPL-SCNC: 136 MMOL/L (ref 136–145)
SYSTOLIC BLOOD PRESSURE - MUSE: NORMAL MMHG
T AXIS - MUSE: 64 DEGREES
VENTRICULAR RATE- MUSE: 94 BPM
WBC # BLD AUTO: 9.2 10E3/UL (ref 4–11)

## 2023-06-05 PROCEDURE — 36415 COLL VENOUS BLD VENIPUNCTURE: CPT | Performed by: NURSE PRACTITIONER

## 2023-06-05 PROCEDURE — 250N000013 HC RX MED GY IP 250 OP 250 PS 637: Performed by: NURSE PRACTITIONER

## 2023-06-05 PROCEDURE — 250N000011 HC RX IP 250 OP 636: Performed by: STUDENT IN AN ORGANIZED HEALTH CARE EDUCATION/TRAINING PROGRAM

## 2023-06-05 PROCEDURE — 85027 COMPLETE CBC AUTOMATED: CPT | Performed by: NURSE PRACTITIONER

## 2023-06-05 PROCEDURE — 97530 THERAPEUTIC ACTIVITIES: CPT | Mod: GO

## 2023-06-05 PROCEDURE — 92526 ORAL FUNCTION THERAPY: CPT | Mod: GN

## 2023-06-05 PROCEDURE — 250N000013 HC RX MED GY IP 250 OP 250 PS 637: Performed by: STUDENT IN AN ORGANIZED HEALTH CARE EDUCATION/TRAINING PROGRAM

## 2023-06-05 PROCEDURE — 83735 ASSAY OF MAGNESIUM: CPT | Performed by: STUDENT IN AN ORGANIZED HEALTH CARE EDUCATION/TRAINING PROGRAM

## 2023-06-05 PROCEDURE — 84100 ASSAY OF PHOSPHORUS: CPT | Performed by: STUDENT IN AN ORGANIZED HEALTH CARE EDUCATION/TRAINING PROGRAM

## 2023-06-05 PROCEDURE — 82310 ASSAY OF CALCIUM: CPT | Performed by: NURSE PRACTITIONER

## 2023-06-05 PROCEDURE — 99239 HOSP IP/OBS DSCHRG MGMT >30: CPT | Performed by: NURSE PRACTITIONER

## 2023-06-05 RX ORDER — CLOPIDOGREL BISULFATE 75 MG/1
75 TABLET ORAL DAILY
Qty: 30 TABLET | Refills: 2 | Status: SHIPPED | OUTPATIENT
Start: 2023-06-06

## 2023-06-05 RX ORDER — ASPIRIN 325 MG
325 TABLET ORAL DAILY
Qty: 30 TABLET | Refills: 2 | Status: SHIPPED | OUTPATIENT
Start: 2023-06-06 | End: 2023-11-01

## 2023-06-05 RX ADMIN — HEPARIN SODIUM 5000 UNITS: 5000 INJECTION, SOLUTION INTRAVENOUS; SUBCUTANEOUS at 00:29

## 2023-06-05 RX ADMIN — ASPIRIN 325 MG ORAL TABLET 325 MG: 325 PILL ORAL at 08:54

## 2023-06-05 RX ADMIN — HEPARIN SODIUM 5000 UNITS: 5000 INJECTION, SOLUTION INTRAVENOUS; SUBCUTANEOUS at 08:54

## 2023-06-05 RX ADMIN — POLYETHYLENE GLYCOL 3350 17 G: 17 POWDER, FOR SOLUTION ORAL at 08:54

## 2023-06-05 RX ADMIN — CLOPIDOGREL BISULFATE 75 MG: 75 TABLET ORAL at 08:54

## 2023-06-05 RX ADMIN — NICOTINE 1 PATCH: 7 PATCH, EXTENDED RELEASE TRANSDERMAL at 09:06

## 2023-06-05 RX ADMIN — LISINOPRIL 20 MG: 20 TABLET ORAL at 08:54

## 2023-06-05 ASSESSMENT — ACTIVITIES OF DAILY LIVING (ADL)
ADLS_ACUITY_SCORE: 33
ADLS_ACUITY_SCORE: 33
DRESSING/BATHING_DIFFICULTY: NO
DIFFICULTY_EATING/SWALLOWING: NO
ADLS_ACUITY_SCORE: 33
CONCENTRATING,_REMEMBERING_OR_MAKING_DECISIONS_DIFFICULTY: NO
EQUIPMENT_CURRENTLY_USED_AT_HOME: CANE, STRAIGHT
CHANGE_IN_FUNCTIONAL_STATUS_SINCE_ONSET_OF_CURRENT_ILLNESS/INJURY: NO
FALL_HISTORY_WITHIN_LAST_SIX_MONTHS: YES
HEARING_DIFFICULTY_OR_DEAF: NO
NUMBER_OF_TIMES_PATIENT_HAS_FALLEN_WITHIN_LAST_SIX_MONTHS: 1
ADLS_ACUITY_SCORE: 33
DIFFICULTY_COMMUNICATING: NO
ADLS_ACUITY_SCORE: 34
ADLS_ACUITY_SCORE: 34
WEAR_GLASSES_OR_BLIND: NO
ADLS_ACUITY_SCORE: 33
DOING_ERRANDS_INDEPENDENTLY_DIFFICULTY: NO
TOILETING_ISSUES: NO
WALKING_OR_CLIMBING_STAIRS_DIFFICULTY: NO

## 2023-06-05 ASSESSMENT — VISUAL ACUITY
OU: BASELINE
OU: NORMAL ACUITY;GLASSES
OU: BASELINE
OU: NORMAL ACUITY;GLASSES

## 2023-06-05 ASSESSMENT — PATIENT HEALTH QUESTIONNAIRE - PHQ9: SUM OF ALL RESPONSES TO PHQ QUESTIONS 1-9: 0

## 2023-06-05 NOTE — PLAN OF CARE
PT: Orders received. Chart reviewed and discussed with care team.  PT not indicated due to lack of skilled PT needs.  Per OT, pt ambulating near baseline mobility level with cane use (has baseline). Defer discharge recommendations to OT.  Will complete orders.

## 2023-06-05 NOTE — PLAN OF CARE
Major Shift Events:  Hmong speaking. Alert and oriented w/ occasional disorientation to time and place. Team aware of disorientation and assessed. PERRLA intact. C/o double vision but on assessment can accurately count fingers held up. Baseline blurry vision, wears glasses at home. Moves all extremities, follows commands. Neuro checks q4hr. Up x SBA. Uses cane to ambulate. Afebrile. Sinus rhythm w/ T wave abnormality.  SBP <200, DBP <105. Labetalol x 1 given for diastolic pressure. Room Air. Easy to chew diet, thin liquids. Coughing when swallowing medication, team aware. Voids spontaneously. BM this shift. PRN tylenol for back pain.   Plan: Work with speech to address coughing w/liquids. Stroke education during the day. Discharge home when appropriate. Monitor and treat per plan of care.  For vital signs and complete assessments, please see documentation flowsheets.

## 2023-06-05 NOTE — PLAN OF CARE
Occupational Therapy Discharge Summary    Reason for therapy discharge:    Discharged to home.    Progress towards therapy goal(s). See goals on Care Plan in [facility] electronic health record for goal details.  Goals partially met.  Barriers to achieving goals:   discharge from facility.    Therapy recommendation(s):    No further therapy is recommended. Recommend home with continued PCA assist to progress ADL IND/safety, functional mobility, overall activity tolerance.

## 2023-06-05 NOTE — PLAN OF CARE
Speech Language Therapy Discharge Summary    Reason for therapy discharge:    Discharged to home with outpatient therapy.    Progress towards therapy goal(s). See goals on Care Plan in [facility] electronic health record for goal details.  Goals partially met.  Barriers to achieving goals:   discharge from facility.    Therapy recommendation(s):    Continued therapy is recommended.  Rationale/Recommendations:  OP SLP is recommended for ongoing management of dysphagia and assessment and treatment of dysarthria as pt reports his speech continues to be below baseline.      At time of discharge recommendations as follows - Ongoing dysphagia and dysarthria treat meant recommended. Recommend easy to chew diet and thin liquids - upright position, slow rate, single sips, and small bites, no straws, avoid talking while eating

## 2023-06-05 NOTE — DISCHARGE SUMMARY
[facility]    Neuro Crit Discharge Summary    Date of Admission: 6/3/2023  Date of Discharge: 06/05/2023    Disposition: Discharged to home  Primary Care Physician: Denia Ames      Admission Diagnosis:   Right M2 occlusion and ulcerated left ICA plaque with 50%    Discharge Diagnosis:   Acute ischemic stroke of M2 due to large-artery atherosclerosis    Problem Leading to Hospitalization (from [facility]):   Tiffany Lenz is a 52 year old Hmong speaking male with history of HTN, hyperlipidemia, DM 2, endovascular repair aneurysm abdominal aorta (2018) and aortic dissection presented to [facility] ED with left tongue numbness and slurred speech that started previous morning. NIHSS 0. Head MRI revealed 4 mm focus of acute/subacute infarct in the left caudate head and everal small foci of acute/subacute infarcts in the left corona radiata and basal ganglia. MRA revealed severe b/l intracranial atherosclerosis, Right M2 Occlusion and also ulcerated left ICA plaque with %50 stenosis. Per discussion with [facility] stroke attending patient was loaded with  mg and Plavix 300 mg and transferred to [facility] for consideration of CEA by neurosurgery service.    Please see H&P dated 6/3/2023 for further details about presentation.    Brief Hospital Course:   Patient presented with  left tongue numbness and slurred speech.      Found to have 4 mm focus of acute/subacute infarct in the left caudate head and everal small foci of acute/subacute infarcts in the left corona radiata and basal ganglia. MRA revealed severe b/l intracranial atherosclerosis, Right M2 Occlusion and also ulcerated left ICA plaque with %50 stenosis..      IV thrombolysis was not given due to:   - unclear or unfavorable risk-benefit profile for extended window thrombolysis beyond the conventional 4.5 hour time window.      Work-up as stated below under Pertinent Investigations.    Etiology is thought to be  large-artery atherosclerosis.      Rehab evaluation: No rehab services required due to lack of ongoing deficit.     Smoking Cessation: education provided    BP Long-term Goal: 140/90 or less    Antithrombotic/Anticoagulant Agent: aspirin 325 mg and clopidogrel (Plavix) 75 mg    Statins: Started on Atorvastatin 80mg       Hgb A1C Goal: < 7.0    Complications: None.     Other problems addressed during this hospitalization:  none    PERTINENT INVESTIGATIONS    Labs  Lipid Panel: Recent Labs   Lab Test 06/03/23  2104   CHOL 185   HDL 29*   *   TRIG 219*     A1C:   Lab Results   Component Value Date    A1C 9.6 06/04/2023    A1C 8.4 02/03/2018     INR: No lab results found in last 7 days.   Coag Panel / Hypercoag Workup: Not indicated  Pending test results: none    Echo: EF 45-50%  Imaging: see above  Endovascular procedure: None     Cardiac Monitoring: Patient had > 24 hrs of cardiac monitor while in hospital.    Findings: No atrial fibrillation was found.    Sleep Apnea Screen:   Questions/Answers      1. Prior to your stroke, have you been told that you snore? No.    2. Prior to your stroke, have you been told that you struggle to breath while you are sleeping? No.    3. Prior to your stroke, do you feel tired and sleepy even after getting a normal night of sleep? No.    Sleep Apnea Screen Findings: Patient has 0-1 symptoms of sleep apnea.  Further sleep study is not recommended at this time.    PHQ-9 Depression Screen Score: 0    Education discussed with: patient and family on blood pressure management, cholesterol management, medical management, smoking cessation plan, follow-up recommendations/plan and 911 call if warning signs of stroke.      PHYSICAL EXAMINATION  Vital Signs:  B/P: 125/96, T: 98.1, P: 90, R: 16    General:  patient lying in bed without any acute distress     HEENT:  normocephalic/atraumatic  Cardio:  RRR  Pulmonary:  no respiratory distress  Abdomen:  soft, non-tender  Extremities:  no  edema  Skin:  intact     Neurologic  Mental Status:  fully alert  Cranial Nerves:  visual fields intact, PERRL, EOMI with normal smooth pursuit, facial sensation intact and symmetric, facial movements symmetric, hearing not formally tested but intact to conversation, palate elevation symmetric and uvula midline, no dysarthria, shoulder shrug strong bilaterally, tongue protrusion midline  Motor:  no abnormal movements, normal tone throughout, normal muscle bulk, no pronator drift, normal and symmetric rapid finger tapping, able to move all limbs spontaneously, strength 5/5 throughout upper and lower extremities  Reflexes:  2+ and symmetric throughout, no clonus  Sensory:  intact/symmetric to light touch and pin prick throughout upper and lower extremities  Coordination:  FNF and HS intact without dysmetria  Station/Gait:  normal width, stride length, turn, and arm swing     National Institutes of Health Stroke Scale (on day of discharge)  NIHSS Total Score: 0    Modified [place] Score (Discharge)    -0      Medications    Current Discharge Medication List      START taking these medications    Details   aspirin (ASA) 325 MG tablet 1 tablet (325 mg) by Oral or Feeding Tube route daily  Qty: 30 tablet, Refills: 2    Associated Diagnoses: Cerebrovascular accident (CVA), unspecified mechanism (H)      clopidogrel (PLAVIX) 75 MG tablet 1 tablet (75 mg) by Oral or Feeding Tube route daily  Qty: 30 tablet, Refills: 2    Associated Diagnoses: Cerebrovascular accident (CVA), unspecified mechanism (H)         CONTINUE these medications which have NOT CHANGED    Details   glipiZIDE (GLUCOTROL XL) 10 MG 24 hr tablet Take 10 mg by mouth daily      atorvastatin (LIPITOR) 80 MG tablet Take 80 mg by mouth every evening      !! FLUoxetine (PROZAC) 20 MG capsule Take 20 mg by mouth daily Along with 40mg for total 60mg daily      !! FLUoxetine (PROZAC) 40 MG capsule Take 40 mg by mouth daily Along with 20mg for total 60mg daily       lisinopril (ZESTRIL) 40 MG tablet Take 40 mg by mouth daily      metFORMIN (GLUCOPHAGE-XR) 500 MG 24 hr tablet Take 2,000 mg by mouth daily (with dinner)       !! - Potential duplicate medications found. Please discuss with provider.          Additional recommendations and follow up:       Reason for your hospital stay    Acute Ischemic Stroke     Activity    Your activity upon discharge: activity as tolerated     [facility]ult [facility] Follow-up and recommended labs and tests    Follow up with primary care provider, Denia Ames, within 7 days for hospital follow- up.  No follow up labs or test are needed.    Follow up with stroke ALBINA at the [facility] within 4-6 weeks. for hospital follow- up.  No follow up labs or test are needed.    Appointments on [facility] and/or [facility] (with [facility] provider or service). Call [phone] if you haven't heard regarding these appointments within 7 days of discharge.     Diet    Follow this diet upon discharge: Regular     Stroke Hospital Follow Up    Please be aware that coverage of these services is subject to the terms and limitations of your health insurance plan.  Call member services at your health plan with any benefit or coverage questions.  [employer] will call you to coordinate care as prescribed by your provider. If you don t hear from a representative within 2 business days, please call [phone].       Stroke Hospital Follow Up (for neurologist use only)    [employer] will call you to coordinate care as prescribed by your provider. If you don t hear from a representative within 2 business days, please call [phone].         Patient was seen and discussed with the Attending, Dr. Dobbins.    Ramesh Stringer, CNP   NCC *[phone]

## 2023-06-05 NOTE — DISCHARGE SUMMARY
Discharged to: (place) at (time) Home @ 2254  Belongings: water bottle, plastic dish, discharge papers, medications, socks   AVS (After Visit Summary) discussed with: patient and family     No acute events prior to discharge. Pt hmong speaking,  used w/o difficulty. A/o x4, moves all extremities equally. PERRLA. Denies altered sensation, slight facial droop noted this AM but improved throughout the day. Denies pain. VSS on RA. Ate breakfast & snacks, no swallowing issues noted. Voiding appropriately in bathroom. Able to ambulate independently w/ cane. Sister & pt  Educated/updated about discharge instructions. Iv removed w/o difficulty.  Stroke education to follow up outpatient, pt  & family aware. No further interventions needed at this time.

## 2023-06-06 ENCOUNTER — APPOINTMENT (OUTPATIENT)
Dept: INTERPRETER SERVICES | Facility: CLINIC | Age: 53
End: 2023-06-06
Payer: COMMERCIAL

## 2023-06-06 ENCOUNTER — PATIENT OUTREACH (OUTPATIENT)
Dept: CARE COORDINATION | Facility: CLINIC | Age: 53
End: 2023-06-06
Payer: COMMERCIAL

## 2023-06-06 LAB
ATRIAL RATE - MUSE: 101 BPM
DIASTOLIC BLOOD PRESSURE - MUSE: 116 MMHG
INTERPRETATION ECG - MUSE: NORMAL
P AXIS - MUSE: 58 DEGREES
PR INTERVAL - MUSE: 182 MS
QRS DURATION - MUSE: 84 MS
QT - MUSE: 392 MS
QTC - MUSE: 508 MS
R AXIS - MUSE: 34 DEGREES
SYSTOLIC BLOOD PRESSURE - MUSE: 194 MMHG
T AXIS - MUSE: 38 DEGREES
VENTRICULAR RATE- MUSE: 101 BPM

## 2023-06-06 NOTE — PROGRESS NOTES
[facility]    Background: Transitional Care Management program identified per system criteria and reviewed by [facility] team for possible outreach.    Assessment: Upon chart review, [facility] Team member will not proceed with patient outreach related to this episode of Transitional Care Management program due to reason below:    Patient has a follow up appointment with an appropriate provider today for hospital discharge     Patient had an ED/Hospital follow-up appointment today with their Primary Care Provider at their primary care clinic, [facility]. Patient's appointment today was appropriate for ED/Hospital discharge and follow-up. No [facility] outreach call needed at this time.    Plan: Transitional Care Management episode addressed appropriately per reason noted above.      LEYLA Medley  [phone]  [facility]    *Connected Care Resource Team does NOT follow patient ongoing. Referrals are identified based on internal discharge reports and the outreach is to ensure patient has an understanding of their discharge instructions.

## 2023-06-12 NOTE — PROGRESS NOTES
"  Assessment & Plan     CVA: No residual deficits, following with neurology.   - continue aspirin, statin and plavix  - follow up with Neurology  - work on HTN and D2M control as below     Type 2 diabetes mellitus with hyperglycemia (H)  Has previously been prescribed many different medications although does not appear he has ever taken them and is not currently checking his blood sugars. Will restart his metformin and and start a GLP1.   - AMB Adult Diabetes Educator Referral; Future  - Med Therapy Management Referral  - Adult Eye  Referral; Future  - exenatide ER (BYDUREON BCISE) 2 MG/0.85ML auto-injector; Inject 2 mg Subcutaneous every 7 days  - metFORMIN (GLUCOPHAGE XR) 500 MG 24 hr tablet; Take 4 tablets (2,000 mg) by mouth daily (with dinner)    Screen for colon cancer  - Colonoscopy Screening  Referral; Future    Encounter for smoking cessation counseling  - nicotine (NICORETTE) 2 MG gum; Place 1 each (2 mg) inside cheek every hour as needed for smoking cessation    Decreased mobility  - Cane Order for DME - ONLY FOR DME    Essential hypertension  Hypertensive today not on medications. Patient does feel stress is playing a role. Review of MAR from hospital and he was on 10mg without optimal control therefore will begin 20mg with close follow up  - lisinopril (ZESTRIL) 20 MG tablet; Take 1 tablet (20 mg) by mouth daily  - follow up in 2 weeks     Nicotine/Tobacco Cessation:  He reports that he has been smoking. He has never used smokeless tobacco.  Nicotine/Tobacco Cessation Plan:   Pharmacotherapies : Nicotine gum    HM: Interested in updating health maintenance some of which done today. Will wait on blood draw until follow up given likely will need to recheck BMP with blood pressure.   - HIV and HepC at next visit      BMI:   Estimated body mass index is 28.78 kg/m  as calculated from the following:    Height as of this encounter: 1.448 m (4' 9\").    Weight as of this encounter: 60.3 kg " "(133 lb).     Depression Screening Follow Up        6/16/2023     2:04 PM   PHQ   PHQ-9 Total Score 20   Q9: Thoughts of better off dead/self-harm past 2 weeks Not at all       Follow Up Actions Taken  Referred patient back to current mental health provider.     Carolyne Anderson MD  [facility] [place] Subjective Sue is a 52 year old, presenting for the following health issues:  Hospital F/U (Stroke, 6/3-6/23, started at , transferred to  for surgery and they decided against it)         View : No data to display.              HPI     Hospitalized 6/3-6/5 at [facility] for CVA with right M2 occlusion and ulcerated left ICA plaque with 50% stenosis. He did not have an ongoing deficit. He was discharged on aspirin 325mg, plavix 75mg and atorvastatin 80mg. His HgbA1c was found to be 9.6.     He has been taking these three new medications daily. He was also discharged on lisinopril, glipizide, metformin. He was taking these but just threw them out.     Patient has a long medication list from previous provider including amlodipine, hydrochlorothiazide, lisinopril, jardiance, januvia, glipizide, and lantus. According to both patient and his sister he has not taken any of these medications in a long time.     He also hasn't been checking his blood sugars.     He is seeing behavior health. They are prescribing medications for his mood and he is taking his fluoxetine.     Sister is helping to fill his pill box and make sure he is taking his medications appropriate.     He has never had a diabetic eye exam.     Still smoking a little bit. 2 cigarettes per day, down from 3-4 before his stroke. Interested in nicorette.    Review of Systems   Constitutional, HEENT, cardiovascular, pulmonary, gi and gu systems are negative, except as otherwise noted.      Objective    BP (!) 156/102   Pulse 87   Temp 98.7  F (37.1  C) (Oral)   Resp 20   Ht 1.448 m (4' 9\")   Wt 60.3 kg (133 lb)   SpO2 98%   BMI 28.78 " kg/m    Body mass index is 28.78 kg/m .  Physical Exam   GENERAL: healthy, alert and no distress  RESP: lungs clear to auscultation - no rales, rhonchi or wheezes  CV: regular rate and rhythm, normal S1 S2, no S3 or S4, no murmur, click or rub, no peripheral edema and peripheral pulses strong  MS: no gross musculoskeletal defects noted, no edema

## 2023-06-16 ENCOUNTER — OFFICE VISIT (OUTPATIENT)
Dept: FAMILY MEDICINE | Facility: CLINIC | Age: 53
End: 2023-06-16
Payer: COMMERCIAL

## 2023-06-16 VITALS
HEART RATE: 87 BPM | SYSTOLIC BLOOD PRESSURE: 156 MMHG | RESPIRATION RATE: 20 BRPM | WEIGHT: 133 LBS | OXYGEN SATURATION: 98 % | DIASTOLIC BLOOD PRESSURE: 102 MMHG | TEMPERATURE: 98.7 F | BODY MASS INDEX: 26.11 KG/M2 | HEIGHT: 60 IN

## 2023-06-16 DIAGNOSIS — Z71.6 ENCOUNTER FOR SMOKING CESSATION COUNSELING: ICD-10-CM

## 2023-06-16 DIAGNOSIS — I10 ESSENTIAL HYPERTENSION: ICD-10-CM

## 2023-06-16 DIAGNOSIS — I63.9 CEREBROVASCULAR ACCIDENT (CVA), UNSPECIFIED MECHANISM (H): Primary | ICD-10-CM

## 2023-06-16 DIAGNOSIS — R26.89 DECREASED MOBILITY: ICD-10-CM

## 2023-06-16 DIAGNOSIS — Z12.11 SCREEN FOR COLON CANCER: ICD-10-CM

## 2023-06-16 DIAGNOSIS — E11.65 TYPE 2 DIABETES MELLITUS WITH HYPERGLYCEMIA, WITHOUT LONG-TERM CURRENT USE OF INSULIN (H): ICD-10-CM

## 2023-06-16 PROBLEM — E11.9 DIABETES MELLITUS, TYPE 2 (H): Status: ACTIVE | Noted: 2023-06-16

## 2023-06-16 PROCEDURE — 99204 OFFICE O/P NEW MOD 45 MIN: CPT | Mod: 25 | Performed by: STUDENT IN AN ORGANIZED HEALTH CARE EDUCATION/TRAINING PROGRAM

## 2023-06-16 PROCEDURE — 90750 HZV VACC RECOMBINANT IM: CPT | Performed by: STUDENT IN AN ORGANIZED HEALTH CARE EDUCATION/TRAINING PROGRAM

## 2023-06-16 PROCEDURE — 90677 PCV20 VACCINE IM: CPT | Performed by: STUDENT IN AN ORGANIZED HEALTH CARE EDUCATION/TRAINING PROGRAM

## 2023-06-16 PROCEDURE — 90746 HEPB VACCINE 3 DOSE ADULT IM: CPT | Performed by: STUDENT IN AN ORGANIZED HEALTH CARE EDUCATION/TRAINING PROGRAM

## 2023-06-16 PROCEDURE — 90471 IMMUNIZATION ADMIN: CPT | Performed by: STUDENT IN AN ORGANIZED HEALTH CARE EDUCATION/TRAINING PROGRAM

## 2023-06-16 PROCEDURE — 90715 TDAP VACCINE 7 YRS/> IM: CPT | Performed by: STUDENT IN AN ORGANIZED HEALTH CARE EDUCATION/TRAINING PROGRAM

## 2023-06-16 PROCEDURE — 90472 IMMUNIZATION ADMIN EACH ADD: CPT | Performed by: STUDENT IN AN ORGANIZED HEALTH CARE EDUCATION/TRAINING PROGRAM

## 2023-06-16 RX ORDER — LISINOPRIL 20 MG/1
20 TABLET ORAL DAILY
Qty: 30 TABLET | Refills: 0 | Status: SHIPPED | OUTPATIENT
Start: 2023-06-16 | End: 2023-06-19

## 2023-06-16 RX ORDER — CHLORTHALIDONE 25 MG/1
25 TABLET ORAL DAILY
Status: CANCELLED | OUTPATIENT
Start: 2023-06-16

## 2023-06-16 RX ORDER — METFORMIN HCL 500 MG
2000 TABLET, EXTENDED RELEASE 24 HR ORAL
Qty: 120 TABLET | Refills: 0 | Status: SHIPPED | OUTPATIENT
Start: 2023-06-16 | End: 2023-06-19

## 2023-06-16 RX ORDER — LIRAGLUTIDE 6 MG/ML
INJECTION SUBCUTANEOUS DAILY
Status: CANCELLED | OUTPATIENT
Start: 2023-06-16

## 2023-06-16 ASSESSMENT — ANXIETY QUESTIONNAIRES
3. WORRYING TOO MUCH ABOUT DIFFERENT THINGS: MORE THAN HALF THE DAYS
2. NOT BEING ABLE TO STOP OR CONTROL WORRYING: MORE THAN HALF THE DAYS
1. FEELING NERVOUS, ANXIOUS, OR ON EDGE: MORE THAN HALF THE DAYS
6. BECOMING EASILY ANNOYED OR IRRITABLE: SEVERAL DAYS
IF YOU CHECKED OFF ANY PROBLEMS ON THIS QUESTIONNAIRE, HOW DIFFICULT HAVE THESE PROBLEMS MADE IT FOR YOU TO DO YOUR WORK, TAKE CARE OF THINGS AT HOME, OR GET ALONG WITH OTHER PEOPLE: VERY DIFFICULT
5. BEING SO RESTLESS THAT IT IS HARD TO SIT STILL: MORE THAN HALF THE DAYS
7. FEELING AFRAID AS IF SOMETHING AWFUL MIGHT HAPPEN: MORE THAN HALF THE DAYS

## 2023-06-16 ASSESSMENT — PATIENT HEALTH QUESTIONNAIRE - PHQ9: SUM OF ALL RESPONSES TO PHQ QUESTIONS 1-9: 20

## 2023-06-16 NOTE — PATIENT INSTRUCTIONS
Here is the letter from neurology, please call to schedule follow up.   Dear Tiffany,    Our records indicate that you have not scheduled an appointment for a Neurology Consult, as recommended by Ramesh Bender. If you wish to schedule within [facility], we have several options to help you schedule your appointment:    Call [phone] Monday-Friday 7 am to 5 pm and we will be happy to assist you.     You can also request an appointment via [employer] if applicable or by visiting [email or web address]     If you have chosen to schedule elsewhere or if you have already made an appointment, please disregard this letter.    If you have any questions or concerns regarding the information above, please contact the [facility] [place] at [phone].      Sincerely,      [facility]UR[facility]

## 2023-06-16 NOTE — Clinical Note
Hi! This patient is following up with both of you in a couple of weeks. He recently had a CVA and just established care with us. I think what happened with his last primary was medication noncompliance which he did not disclose and therefore was started on more and more medications that he never took and so wasn't reflected in his numbers. He is very open about this and now his sister is helping with his care. I decided to start over with just lisinopril, metformin and bydureon and have him follow up closely for further titration of his diabetes and HTN medications. I think continued close follow up is key.   Please let me know if you have any questions.   Thanks so much, Carolyne

## 2023-06-19 ENCOUNTER — TELEPHONE (OUTPATIENT)
Dept: ORTHOPEDICS | Facility: CLINIC | Age: 53
End: 2023-06-19
Payer: COMMERCIAL

## 2023-06-19 DIAGNOSIS — E11.65 TYPE 2 DIABETES MELLITUS WITH HYPERGLYCEMIA, WITHOUT LONG-TERM CURRENT USE OF INSULIN (H): ICD-10-CM

## 2023-06-19 DIAGNOSIS — I10 ESSENTIAL HYPERTENSION: ICD-10-CM

## 2023-06-19 DIAGNOSIS — Z71.6 ENCOUNTER FOR SMOKING CESSATION COUNSELING: ICD-10-CM

## 2023-06-19 RX ORDER — METFORMIN HCL 500 MG
1000 TABLET, EXTENDED RELEASE 24 HR ORAL 2 TIMES DAILY WITH MEALS
Qty: 120 TABLET | Refills: 0 | Status: ON HOLD | OUTPATIENT
Start: 2023-06-19 | End: 2023-08-13

## 2023-06-19 RX ORDER — LISINOPRIL 20 MG/1
20 TABLET ORAL DAILY
Qty: 30 TABLET | Refills: 0 | Status: ON HOLD | OUTPATIENT
Start: 2023-06-19 | End: 2023-08-13

## 2023-06-19 RX ORDER — METFORMIN HCL 500 MG
TABLET, EXTENDED RELEASE 24 HR ORAL
Qty: 120 TABLET | Refills: 0 | Status: SHIPPED | OUTPATIENT
Start: 2023-06-19 | End: 2023-06-19

## 2023-06-19 NOTE — TELEPHONE ENCOUNTER
[facility] phone call message- general phone call:    Reason for call: Patient sister came in today regarding medication that was prescribed by Dr. Kwan on 6/16/23. Seems like patient asked for medication to be sent to [facility] across the street from [place] in stead of [facility]. Caller does not know where [facility] is, and has been trying to get medication since Friday and today. If can possible work on this as soon as possible, so they can get medication. Thank you    Return call needed: Yes    OK to leave a message on voice mail? Yes    Primary language: ong      needed? Yes    Call taken on June 19, 2023 at 11:32 AM by Desi Guerrero

## 2023-06-19 NOTE — TELEPHONE ENCOUNTER
[facility] phone call message- medication clarification/question:    Full Medication Name:     - metFORMIN (GLUCOPHAGE XR) 500 MG 24 hr tablet    - lisinopril (ZESTRIL) 20 MG tablet    - exenatide ER (BYDUREON BCISE) 2 MG/0.85ML auto-injector      - nicotine (NICORETTE) 2 MG gum    Question: [facility] called stating [facility], please resend medications to new pharmacy thank you.    Pharmacy confirmed as    [id number] - [place], [place] - [address] AT [facility]: Yes    OK to leave a message on voice mail? Yes    Primary language: Hmong      needed? Yes    Call taken on June 19, 2023 at 11:37 AM by Leeann Alvarado

## 2023-06-22 ENCOUNTER — TELEPHONE (OUTPATIENT)
Dept: NEUROLOGY | Facility: CLINIC | Age: 53
End: 2023-06-22

## 2023-06-22 NOTE — TELEPHONE ENCOUNTER
This encounter is being sent to inform the clinic that this patient has a referral from [facility] for the diagnoses of  and has requested that this patient be seen within 4-6 weeks.  Based on the availability of our provider(s), we are unable to accommodate this request.      Were all sites offered this patient?  Pt reviewed [facility] due to referral stating [facility] for stroke.       Does scheduling algorithm request to schedule next available?  Patient appointment has not been scheduled.  Please review the referral request for accommodation and contact the patient.  If unable to accommodate, please resubmit a referral and indicate a preferred partner or affiliate location using Provider Finder or Scheduling Instructions field.

## 2023-06-23 ENCOUNTER — APPOINTMENT (OUTPATIENT)
Dept: INTERPRETER SERVICES | Facility: CLINIC | Age: 53
End: 2023-06-23
Payer: COMMERCIAL

## 2023-06-23 ENCOUNTER — HOSPITAL ENCOUNTER (OUTPATIENT)
Facility: AMBULATORY SURGERY CENTER | Age: 53
End: 2023-06-23
Attending: FAMILY MEDICINE
Payer: COMMERCIAL

## 2023-06-23 ENCOUNTER — TELEPHONE (OUTPATIENT)
Dept: FAMILY MEDICINE | Facility: CLINIC | Age: 53
End: 2023-06-23
Payer: COMMERCIAL

## 2023-06-23 NOTE — TELEPHONE ENCOUNTER
Final Scheduling Details   Colonoscopy prep sent?  Miralax, Gatorade, Dulcolax and Magnesium Citrate: medication all OTC patient to purchase     Procedure scheduled:  Yes    Surgeon:  Dr. Moss    Date of procedure:  10/19/21 1:00pm  Location  [facility]    Sedation   Moderate Sedation    Patient Reminders:  Patient must have an adult .  Patient cannot take any type of public or medical transportation alone.

## 2023-06-26 NOTE — TELEPHONE ENCOUNTER
Schedule Patient With: Any Stroke [facility]   Specific Diagnosis: Right M2 Occlusion and also ulcerated left ICA plaque with %50 stenosis.   Follow up range in weeks (after discharge) 4-6weeks   Contact: Patient   Scheduling Instructions: [facility] will call you to coordinate care as prescribed by your provider. If you don t hear from a representative within 2 business days, please call [phone].     Referral placed 6/5/23

## 2023-07-05 ENCOUNTER — MEDICAL CORRESPONDENCE (OUTPATIENT)
Dept: HEALTH INFORMATION MANAGEMENT | Facility: CLINIC | Age: 53
End: 2023-07-05

## 2023-07-07 ENCOUNTER — PATIENT OUTREACH (OUTPATIENT)
Dept: CARE COORDINATION | Facility: CLINIC | Age: 53
End: 2023-07-07

## 2023-07-07 NOTE — PROGRESS NOTES
Clinic Care Coordination Contact    Follow Up Progress Note      Assessment: I got a message from the  about the pt. It seems like Graciela wanted me to call the pt and setup a hospital follow up. I called and talked to the pt sister, she stated that the pt still in the hospital. She stated that the pt has a lot of arm and leg pain, and is not sure when the pt will be in the hospital for. She stated that she will call the clinic, when the pt is discharged.      Care Gaps:    Health Maintenance Due   Topic Date Due     YEARLY PREVENTIVE VISIT  Never done     MICROALBUMIN  Never done     DIABETIC FOOT EXAM  Never done     ADVANCE CARE PLANNING  Never done     EYE EXAM  Never done     COLORECTAL CANCER SCREENING  Never done     HIV SCREENING  Never done     HEPATITIS C SCREENING  Never done     COVID-19 Vaccine (4 - Moderna series) 07/14/2022     HEPATITIS B IMMUNIZATION (2 of 3 - 19+ 3-dose series) 07/14/2023           Care Plans      Intervention/Education provided during outreach:               Plan:     Care Coordinator will follow up in

## 2023-07-10 NOTE — TELEPHONE ENCOUNTER
Spoke to patients sister via  services, muna states her brother Tiffany was in hospital for a week and he is still in rehab, discharge is unknown as family is unable to care for him at home. She is aware of neurology follow up recommendation and was able to take down scheduling phone number.    TAMI WILLIS, CMA

## 2023-07-16 NOTE — PLAN OF CARE
Problem: Patient Care Overview  Goal: Plan of Care/Patient Progress Review  Outcome: No Change  S: Denies pain. Insisted on sleeping in recliner all night. Noted pt does reposition self frequently. Skin back, sacrum, and buttocks noted WNL during bath. Gave prn labetalol between sched lisinopril and labetalol, and x1 since to keep SBP<120 and HR<70.  B: Aortic aneurysm.  A: Stable.  R: Monitor VS and give prns as needed. Finish admit required assessments when  here today.        show

## 2023-08-02 NOTE — TELEPHONE ENCOUNTER
Spoke to Yazan again, I discussed need for stroke follow up, Yazan states patient had another stroke and was re hospitalized  and family is unable to care for him.     TAMI WILLIS, CMA

## 2023-08-09 ENCOUNTER — APPOINTMENT (OUTPATIENT)
Dept: INTERPRETER SERVICES | Facility: CLINIC | Age: 53
End: 2023-08-09
Payer: COMMERCIAL

## 2023-08-09 ENCOUNTER — TELEPHONE (OUTPATIENT)
Dept: CARE COORDINATION | Facility: CLINIC | Age: 53
End: 2023-08-09
Payer: COMMERCIAL

## 2023-08-09 NOTE — TELEPHONE ENCOUNTER
Please see TE from 8/9 for additional outreach documentation.      Zoë Yanez BS, RN, SCRN  RN Stroke Neurology Care Coordinator  [facility] Neuroscience Service Line

## 2023-08-09 NOTE — TELEPHONE ENCOUNTER
Stroke RN Care Coordination - Follow-Up Outreach Note     SITUATION     Tiffany Her is a 52 year old male who is receiving support for:  Clinic Care Coordination - Follow-up (Stroke Hospital Follow-Up)    BACKGROUND     Per TE from 6/22/23, patient was advised to follow up with [facility] Stroke [facility] team after having discharged from [facility] on 6/5/23. However, unfortunately the patient was subsequently admitted to [facility] from 6/28 to 7/5 with acute stroke due to suspected medication non-compliance. Patient's stroke this time was symptomatic, leaving him with L sided weakness and dysarthria. He was then discharged to TCU at [facility] where he currently still stays.    ASSESSMENT     RNCC was asked to outreach to pt's family to inquire on pt's current status and discuss neurology follow up.    Spoke with pt's sister Yazan who states that the pt is still requiring extensive assistance at TCU and that they would not be able to transfer him themselves to and from an appointment, let alone are there any plans to have him discharge home yet. She states they had a care conference early on when the pt first arrived to TCU, but they have not had any additional ones to discuss discharge. I asked her if [facility] had contacted her at all recently to schedule neurology follow up with their team and she said no. I told her that I will reach out to the TCU to gain some more info on his functional status and determine if possibly a virtual visit would be appropriate.     RNCC will also discuss with our team as to which neurology group would be most appropriate for the pt to follow up with given that he was seen by our group initially, but it was [facility] who managed the more significant second stroke.    PLAN     Follow-up plan:  LVM with TCU SW requesting a return call to discuss the pt's status further. Also will route to Ann-Marie CANO to advise on if she'd like me to pursue having the pt  follow up with our team or if he should be seen elsewhere.    Zoë Yanez BS, RN, SCRN  RN Stroke Neurology Care Coordinator  [facility] Neuroscience Service Line

## 2023-08-12 ENCOUNTER — APPOINTMENT (OUTPATIENT)
Dept: CT IMAGING | Facility: HOSPITAL | Age: 53
End: 2023-08-12
Attending: EMERGENCY MEDICINE
Payer: COMMERCIAL

## 2023-08-12 ENCOUNTER — HOSPITAL ENCOUNTER (EMERGENCY)
Facility: HOSPITAL | Age: 53
Discharge: ANOTHER HOSPITAL | End: 2023-08-13
Attending: EMERGENCY MEDICINE | Admitting: EMERGENCY MEDICINE
Payer: COMMERCIAL

## 2023-08-12 DIAGNOSIS — R10.32 LEFT LOWER QUADRANT ABDOMINAL PAIN: ICD-10-CM

## 2023-08-12 DIAGNOSIS — I71.43 INFRARENAL ABDOMINAL AORTIC ANEURYSM (AAA) WITHOUT RUPTURE (H): ICD-10-CM

## 2023-08-12 LAB
ALBUMIN SERPL BCG-MCNC: 4.1 G/DL (ref 3.5–5.2)
ALP SERPL-CCNC: 141 U/L (ref 40–129)
ALT SERPL W P-5'-P-CCNC: 25 U/L (ref 0–70)
ANION GAP SERPL CALCULATED.3IONS-SCNC: 14 MMOL/L (ref 7–15)
AST SERPL W P-5'-P-CCNC: 27 U/L (ref 0–45)
BASOPHILS # BLD AUTO: 0.1 10E3/UL (ref 0–0.2)
BASOPHILS NFR BLD AUTO: 1 %
BILIRUB SERPL-MCNC: 0.4 MG/DL
BUN SERPL-MCNC: 19.3 MG/DL (ref 6–20)
CALCIUM SERPL-MCNC: 9.6 MG/DL (ref 8.6–10)
CHLORIDE SERPL-SCNC: 103 MMOL/L (ref 98–107)
CREAT SERPL-MCNC: 0.86 MG/DL (ref 0.67–1.17)
CRP SERPL-MCNC: 3.5 MG/L
DEPRECATED HCO3 PLAS-SCNC: 21 MMOL/L (ref 22–29)
EOSINOPHIL # BLD AUTO: 0.2 10E3/UL (ref 0–0.7)
EOSINOPHIL NFR BLD AUTO: 2 %
ERYTHROCYTE [DISTWIDTH] IN BLOOD BY AUTOMATED COUNT: 12.7 % (ref 10–15)
GFR SERPL CREATININE-BSD FRML MDRD: >90 ML/MIN/1.73M2
GLUCOSE SERPL-MCNC: 149 MG/DL (ref 70–99)
HCT VFR BLD AUTO: 42.2 % (ref 40–53)
HGB BLD-MCNC: 14.1 G/DL (ref 13.3–17.7)
IMM GRANULOCYTES # BLD: 0 10E3/UL
IMM GRANULOCYTES NFR BLD: 0 %
INR PPP: 1.08 (ref 0.85–1.15)
LACTATE SERPL-SCNC: 2.5 MMOL/L (ref 0.7–2)
LIPASE SERPL-CCNC: 30 U/L (ref 13–60)
LYMPHOCYTES # BLD AUTO: 1.7 10E3/UL (ref 0.8–5.3)
LYMPHOCYTES NFR BLD AUTO: 18 %
MCH RBC QN AUTO: 31.1 PG (ref 26.5–33)
MCHC RBC AUTO-ENTMCNC: 33.4 G/DL (ref 31.5–36.5)
MCV RBC AUTO: 93 FL (ref 78–100)
MONOCYTES # BLD AUTO: 0.7 10E3/UL (ref 0–1.3)
MONOCYTES NFR BLD AUTO: 7 %
NEUTROPHILS # BLD AUTO: 7.1 10E3/UL (ref 1.6–8.3)
NEUTROPHILS NFR BLD AUTO: 72 %
NRBC # BLD AUTO: 0 10E3/UL
NRBC BLD AUTO-RTO: 0 /100
PLATELET # BLD AUTO: 233 10E3/UL (ref 150–450)
POTASSIUM SERPL-SCNC: 4.2 MMOL/L (ref 3.4–5.3)
PROT SERPL-MCNC: 7.5 G/DL (ref 6.4–8.3)
RBC # BLD AUTO: 4.54 10E6/UL (ref 4.4–5.9)
SODIUM SERPL-SCNC: 138 MMOL/L (ref 136–145)
WBC # BLD AUTO: 9.7 10E3/UL (ref 4–11)

## 2023-08-12 PROCEDURE — 258N000003 HC RX IP 258 OP 636: Performed by: EMERGENCY MEDICINE

## 2023-08-12 PROCEDURE — 83735 ASSAY OF MAGNESIUM: CPT | Performed by: EMERGENCY MEDICINE

## 2023-08-12 PROCEDURE — 36415 COLL VENOUS BLD VENIPUNCTURE: CPT | Performed by: EMERGENCY MEDICINE

## 2023-08-12 PROCEDURE — 74174 CTA ABD&PLVS W/CONTRAST: CPT

## 2023-08-12 PROCEDURE — 99285 EMERGENCY DEPT VISIT HI MDM: CPT | Mod: 25

## 2023-08-12 PROCEDURE — 250N000011 HC RX IP 250 OP 636: Performed by: EMERGENCY MEDICINE

## 2023-08-12 PROCEDURE — 85610 PROTHROMBIN TIME: CPT | Performed by: EMERGENCY MEDICINE

## 2023-08-12 PROCEDURE — 80053 COMPREHEN METABOLIC PANEL: CPT | Performed by: EMERGENCY MEDICINE

## 2023-08-12 PROCEDURE — 96361 HYDRATE IV INFUSION ADD-ON: CPT

## 2023-08-12 PROCEDURE — 85025 COMPLETE CBC W/AUTO DIFF WBC: CPT | Performed by: EMERGENCY MEDICINE

## 2023-08-12 PROCEDURE — 93005 ELECTROCARDIOGRAM TRACING: CPT | Performed by: EMERGENCY MEDICINE

## 2023-08-12 PROCEDURE — 86140 C-REACTIVE PROTEIN: CPT | Performed by: EMERGENCY MEDICINE

## 2023-08-12 PROCEDURE — 250N000011 HC RX IP 250 OP 636: Mod: JZ | Performed by: EMERGENCY MEDICINE

## 2023-08-12 PROCEDURE — 96374 THER/PROPH/DIAG INJ IV PUSH: CPT | Mod: 59

## 2023-08-12 PROCEDURE — 83690 ASSAY OF LIPASE: CPT | Performed by: EMERGENCY MEDICINE

## 2023-08-12 PROCEDURE — 83605 ASSAY OF LACTIC ACID: CPT | Performed by: EMERGENCY MEDICINE

## 2023-08-12 PROCEDURE — 96375 TX/PRO/DX INJ NEW DRUG ADDON: CPT | Mod: 59

## 2023-08-12 RX ORDER — MORPHINE SULFATE 4 MG/ML
4 INJECTION, SOLUTION INTRAMUSCULAR; INTRAVENOUS ONCE
Status: COMPLETED | OUTPATIENT
Start: 2023-08-12 | End: 2023-08-12

## 2023-08-12 RX ORDER — ONDANSETRON 2 MG/ML
4 INJECTION INTRAMUSCULAR; INTRAVENOUS ONCE
Status: COMPLETED | OUTPATIENT
Start: 2023-08-12 | End: 2023-08-12

## 2023-08-12 RX ORDER — IOPAMIDOL 755 MG/ML
100 INJECTION, SOLUTION INTRAVASCULAR ONCE
Status: COMPLETED | OUTPATIENT
Start: 2023-08-13 | End: 2023-08-12

## 2023-08-12 RX ADMIN — ONDANSETRON 4 MG: 2 INJECTION INTRAMUSCULAR; INTRAVENOUS at 23:29

## 2023-08-12 RX ADMIN — IOPAMIDOL 90 ML: 755 INJECTION, SOLUTION INTRAVENOUS at 23:57

## 2023-08-12 RX ADMIN — MORPHINE SULFATE 4 MG: 4 INJECTION, SOLUTION INTRAMUSCULAR; INTRAVENOUS at 23:29

## 2023-08-12 RX ADMIN — SODIUM CHLORIDE 1000 ML: 9 INJECTION, SOLUTION INTRAVENOUS at 23:29

## 2023-08-12 ASSESSMENT — ACTIVITIES OF DAILY LIVING (ADL): ADLS_ACUITY_SCORE: 35

## 2023-08-13 ENCOUNTER — HOSPITAL ENCOUNTER (OUTPATIENT)
Facility: CLINIC | Age: 53
Setting detail: OBSERVATION
Discharge: NURSING, REHAB OR LONG-TERM CARE | End: 2023-08-14
Attending: HOSPITALIST | Admitting: INTERNAL MEDICINE
Payer: COMMERCIAL

## 2023-08-13 ENCOUNTER — APPOINTMENT (OUTPATIENT)
Dept: PHYSICAL THERAPY | Facility: CLINIC | Age: 53
End: 2023-08-13
Attending: INTERNAL MEDICINE
Payer: COMMERCIAL

## 2023-08-13 VITALS
SYSTOLIC BLOOD PRESSURE: 136 MMHG | OXYGEN SATURATION: 96 % | WEIGHT: 130 LBS | HEIGHT: 60 IN | BODY MASS INDEX: 25.52 KG/M2 | TEMPERATURE: 98.5 F | RESPIRATION RATE: 13 BRPM | HEART RATE: 78 BPM | DIASTOLIC BLOOD PRESSURE: 72 MMHG

## 2023-08-13 DIAGNOSIS — I63.9 CEREBROVASCULAR ACCIDENT (CVA), UNSPECIFIED MECHANISM (H): Primary | ICD-10-CM

## 2023-08-13 DIAGNOSIS — E83.42 HYPOMAGNESEMIA: ICD-10-CM

## 2023-08-13 PROBLEM — K52.9 GASTROENTERITIS: Status: ACTIVE | Noted: 2023-08-13

## 2023-08-13 LAB
FASTING STATUS PATIENT QL REPORTED: YES
GLUCOSE BLDC GLUCOMTR-MCNC: 105 MG/DL (ref 70–99)
GLUCOSE BLDC GLUCOMTR-MCNC: 110 MG/DL (ref 70–99)
GLUCOSE BLDC GLUCOMTR-MCNC: 125 MG/DL (ref 70–99)
GLUCOSE BLDC GLUCOMTR-MCNC: 143 MG/DL (ref 70–99)
GLUCOSE BLDC GLUCOMTR-MCNC: 99 MG/DL (ref 70–99)
GLUCOSE SERPL-MCNC: 96 MG/DL (ref 70–99)
LACTATE SERPL-SCNC: 1.6 MMOL/L (ref 0.7–2)
MAGNESIUM SERPL-MCNC: 1.6 MG/DL (ref 1.7–2.3)
MAGNESIUM SERPL-MCNC: 1.6 MG/DL (ref 1.7–2.3)

## 2023-08-13 PROCEDURE — 82962 GLUCOSE BLOOD TEST: CPT

## 2023-08-13 PROCEDURE — G0378 HOSPITAL OBSERVATION PER HR: HCPCS

## 2023-08-13 PROCEDURE — 96361 HYDRATE IV INFUSION ADD-ON: CPT

## 2023-08-13 PROCEDURE — 36415 COLL VENOUS BLD VENIPUNCTURE: CPT | Performed by: EMERGENCY MEDICINE

## 2023-08-13 PROCEDURE — 99222 1ST HOSP IP/OBS MODERATE 55: CPT | Mod: GC | Performed by: SURGERY

## 2023-08-13 PROCEDURE — 97161 PT EVAL LOW COMPLEX 20 MIN: CPT | Mod: GP

## 2023-08-13 PROCEDURE — 36415 COLL VENOUS BLD VENIPUNCTURE: CPT | Performed by: HOSPITALIST

## 2023-08-13 PROCEDURE — 250N000013 HC RX MED GY IP 250 OP 250 PS 637: Performed by: INTERNAL MEDICINE

## 2023-08-13 PROCEDURE — 82947 ASSAY GLUCOSE BLOOD QUANT: CPT | Performed by: HOSPITALIST

## 2023-08-13 PROCEDURE — 99222 1ST HOSP IP/OBS MODERATE 55: CPT | Mod: AI | Performed by: INTERNAL MEDICINE

## 2023-08-13 PROCEDURE — 250N000013 HC RX MED GY IP 250 OP 250 PS 637: Performed by: HOSPITALIST

## 2023-08-13 PROCEDURE — 99207 PR NO CHARGE LOS: CPT | Performed by: HOSPITALIST

## 2023-08-13 PROCEDURE — 258N000003 HC RX IP 258 OP 636: Performed by: INTERNAL MEDICINE

## 2023-08-13 PROCEDURE — 83735 ASSAY OF MAGNESIUM: CPT | Performed by: HOSPITALIST

## 2023-08-13 PROCEDURE — 97530 THERAPEUTIC ACTIVITIES: CPT | Mod: GP

## 2023-08-13 PROCEDURE — 83605 ASSAY OF LACTIC ACID: CPT | Performed by: EMERGENCY MEDICINE

## 2023-08-13 RX ORDER — NALOXONE HYDROCHLORIDE 0.4 MG/ML
0.2 INJECTION, SOLUTION INTRAMUSCULAR; INTRAVENOUS; SUBCUTANEOUS
Status: DISCONTINUED | OUTPATIENT
Start: 2023-08-13 | End: 2023-08-14 | Stop reason: HOSPADM

## 2023-08-13 RX ORDER — METFORMIN HCL 500 MG
1000 TABLET, EXTENDED RELEASE 24 HR ORAL
COMMUNITY

## 2023-08-13 RX ORDER — AMLODIPINE BESYLATE 5 MG/1
5 TABLET ORAL DAILY
COMMUNITY

## 2023-08-13 RX ORDER — ACETAMINOPHEN 325 MG/1
650 TABLET ORAL EVERY 6 HOURS PRN
Status: DISCONTINUED | OUTPATIENT
Start: 2023-08-13 | End: 2023-08-14 | Stop reason: HOSPADM

## 2023-08-13 RX ORDER — ONDANSETRON 2 MG/ML
4 INJECTION INTRAMUSCULAR; INTRAVENOUS EVERY 6 HOURS PRN
Status: DISCONTINUED | OUTPATIENT
Start: 2023-08-13 | End: 2023-08-14 | Stop reason: HOSPADM

## 2023-08-13 RX ORDER — NICOTINE 21 MG/24HR
1 PATCH, TRANSDERMAL 24 HOURS TRANSDERMAL EVERY 24 HOURS
COMMUNITY
End: 2023-12-20

## 2023-08-13 RX ORDER — NALOXONE HYDROCHLORIDE 0.4 MG/ML
0.4 INJECTION, SOLUTION INTRAMUSCULAR; INTRAVENOUS; SUBCUTANEOUS
Status: DISCONTINUED | OUTPATIENT
Start: 2023-08-13 | End: 2023-08-14 | Stop reason: HOSPADM

## 2023-08-13 RX ORDER — ASPIRIN 81 MG/1
81 TABLET, CHEWABLE ORAL DAILY
Status: ON HOLD | COMMUNITY
End: 2023-08-14

## 2023-08-13 RX ORDER — ASPIRIN 325 MG
325 TABLET ORAL DAILY
Status: DISCONTINUED | OUTPATIENT
Start: 2023-08-13 | End: 2023-08-14 | Stop reason: HOSPADM

## 2023-08-13 RX ORDER — AMLODIPINE BESYLATE 5 MG/1
5 TABLET ORAL DAILY
Status: DISCONTINUED | OUTPATIENT
Start: 2023-08-13 | End: 2023-08-14 | Stop reason: HOSPADM

## 2023-08-13 RX ORDER — AMOXICILLIN 250 MG
1 CAPSULE ORAL 2 TIMES DAILY PRN
Status: DISCONTINUED | OUTPATIENT
Start: 2023-08-13 | End: 2023-08-14 | Stop reason: HOSPADM

## 2023-08-13 RX ORDER — ACETAMINOPHEN 650 MG/1
650 SUPPOSITORY RECTAL EVERY 6 HOURS PRN
Status: DISCONTINUED | OUTPATIENT
Start: 2023-08-13 | End: 2023-08-14 | Stop reason: HOSPADM

## 2023-08-13 RX ORDER — ONDANSETRON 4 MG/1
4 TABLET, ORALLY DISINTEGRATING ORAL EVERY 6 HOURS PRN
Status: DISCONTINUED | OUTPATIENT
Start: 2023-08-13 | End: 2023-08-14 | Stop reason: HOSPADM

## 2023-08-13 RX ORDER — CLOPIDOGREL BISULFATE 75 MG/1
75 TABLET ORAL DAILY
Status: DISCONTINUED | OUTPATIENT
Start: 2023-08-13 | End: 2023-08-14 | Stop reason: HOSPADM

## 2023-08-13 RX ORDER — SODIUM CHLORIDE 9 MG/ML
INJECTION, SOLUTION INTRAVENOUS CONTINUOUS
Status: DISCONTINUED | OUTPATIENT
Start: 2023-08-13 | End: 2023-08-14 | Stop reason: HOSPADM

## 2023-08-13 RX ORDER — DEXTROSE MONOHYDRATE 25 G/50ML
25-50 INJECTION, SOLUTION INTRAVENOUS
Status: DISCONTINUED | OUTPATIENT
Start: 2023-08-13 | End: 2023-08-14 | Stop reason: HOSPADM

## 2023-08-13 RX ORDER — AMOXICILLIN 250 MG
2 CAPSULE ORAL 2 TIMES DAILY PRN
Status: DISCONTINUED | OUTPATIENT
Start: 2023-08-13 | End: 2023-08-14 | Stop reason: HOSPADM

## 2023-08-13 RX ORDER — ATORVASTATIN CALCIUM 40 MG/1
80 TABLET, FILM COATED ORAL EVERY EVENING
Status: DISCONTINUED | OUTPATIENT
Start: 2023-08-13 | End: 2023-08-14 | Stop reason: HOSPADM

## 2023-08-13 RX ORDER — NICOTINE POLACRILEX 4 MG
15-30 LOZENGE BUCCAL
Status: DISCONTINUED | OUTPATIENT
Start: 2023-08-13 | End: 2023-08-14 | Stop reason: HOSPADM

## 2023-08-13 RX ORDER — OXYCODONE HYDROCHLORIDE 5 MG/1
5 TABLET ORAL EVERY 4 HOURS PRN
Status: DISCONTINUED | OUTPATIENT
Start: 2023-08-13 | End: 2023-08-14 | Stop reason: HOSPADM

## 2023-08-13 RX ADMIN — AMLODIPINE BESYLATE 5 MG: 5 TABLET ORAL at 10:53

## 2023-08-13 RX ADMIN — SODIUM CHLORIDE: 9 INJECTION, SOLUTION INTRAVENOUS at 05:05

## 2023-08-13 RX ADMIN — FLUOXETINE 60 MG: 20 CAPSULE ORAL at 10:53

## 2023-08-13 RX ADMIN — OXYCODONE HYDROCHLORIDE 5 MG: 5 TABLET ORAL at 16:15

## 2023-08-13 RX ADMIN — ASPIRIN 325 MG ORAL TABLET 325 MG: 325 PILL ORAL at 09:10

## 2023-08-13 RX ADMIN — CLOPIDOGREL BISULFATE 75 MG: 75 TABLET ORAL at 09:10

## 2023-08-13 RX ADMIN — ATORVASTATIN CALCIUM 80 MG: 40 TABLET, FILM COATED ORAL at 19:26

## 2023-08-13 RX ADMIN — OXYCODONE HYDROCHLORIDE 5 MG: 5 TABLET ORAL at 11:50

## 2023-08-13 RX ADMIN — SODIUM CHLORIDE: 9 INJECTION, SOLUTION INTRAVENOUS at 14:50

## 2023-08-13 ASSESSMENT — ACTIVITIES OF DAILY LIVING (ADL)
ADLS_ACUITY_SCORE: 35
ADLS_ACUITY_SCORE: 53
ADLS_ACUITY_SCORE: 53
ADLS_ACUITY_SCORE: 51
ADLS_ACUITY_SCORE: 44
ADLS_ACUITY_SCORE: 53
ADLS_ACUITY_SCORE: 44
ADLS_ACUITY_SCORE: 51
ADLS_ACUITY_SCORE: 41
ADLS_ACUITY_SCORE: 53
ADLS_ACUITY_SCORE: 51

## 2023-08-13 NOTE — PROGRESS NOTES
Observation goals  PRIOR TO DISCHARGE        Comments:   -diagnostic tests and consults completed and resulted  Not met  -vital signs normal or at patient baseline  Met  -tolerating oral intake to maintain hydration  Met  -adequate pain control on oral analgesics  Met  Nurse to notify provider when observation goals have been met and patient is ready for discharge.

## 2023-08-13 NOTE — PROGRESS NOTES
"   08/13/23 1500   Appointment Info   Signing Clinician's Name / Credentials (PT) Melissa Haddad DPT       Present yes   Language Hmong   Living Environment   People in Home spouse   Current Living Arrangements   (Pt was currently living at a TCU prior to hospital admission.)   Transportation Anticipated family or friend will provide   Living Environment Comments Unable to recieve subjective information or detailed PLOF due to pt's dysarthria with  services. Per chart review, pt lives w/ wife at baseline, was currently at a TCU recieving rehab for his previous stroke in June.   Self-Care   Usual Activity Tolerance good   Current Activity Tolerance poor   Regular Exercise No   Equipment Currently Used at Home cane, straight   Activity/Exercise/Self-Care Comment Unable to recieve subjective information or detailed PLOF due to pt's dysarthria with  services. Per chart review, pt lives w/ wife at baseline, was currently at a TCU recieving rehab for his previous stroke in June.   General Information   Onset of Illness/Injury or Date of Surgery 08/13/23   Referring Physician Linh Burris MD   Patient/Family Therapy Goals Statement (PT) \"To get back home\"   Pertinent History of Current Problem (include personal factors and/or comorbidities that impact the POC) Pt is a 52 year old male with a pertinent medical history of aortic aneurysm, thoracoabdominal aorta dissection, penetrating ulcer of aorta, JOSEPH, T2DM, HTN, hypertensive emergency, HLD, acute respiratory failure with hypoxia, thrombocytopenia, CVA involving middle cerebral artery territory, dysarthria due to acute stroke, posterior reversible encephalopathy syndrome, left hemiparesis, obesity, acute alcoholic intoxication with delirium, who presents to this ED EMS for evaluation of abdominal pain. Found to have AAA. No intervention indicated for AAA at this time, continue treatment for gastroenteritis.   Existing " Precautions/Restrictions fall   Cognition   Affect/Mental Status (Cognition) low arousal/lethargic;unable/difficult to assess   Orientation Status (Cognition) oriented to;person   Follows Commands (Cognition) follows one-step commands;physical/tactile prompts required;repetition of directions required;verbal cues/prompting required   Pain Assessment   Patient Currently in Pain No   Integumentary/Edema   Integumentary/Edema no deficits were identifed   Posture    Posture Forward head position;Protracted shoulders   Range of Motion (ROM)   Range of Motion ROM deficits secondary to weakness   Strength (Manual Muscle Testing)   Strength (Manual Muscle Testing) Able to perform R SLR;Deficits observed during functional mobility   Strength Comments Pt unable to lift L LE or L UE up from bed against gravity.   Bed Mobility   Comment, (Bed Mobility) Supine>sitting EOB completed Mod Ax1   Transfers   Comment, (Transfers) Unable to perform sit>stand at this time   Gait/Stairs (Locomotion)   Comment, (Gait/Stairs) Unable to assess at this time   Balance   Balance other (describe)   Balance Comments Impaired sitting balance at EOB, pt required Mod-Max Ax1 to maintain sitting at EOB.   Sensory Examination   Sensory Perception patient reports no sensory changes   Coordination   Coordination no deficits were identified   Muscle Tone   Muscle Tone no deficits were identified   Clinical Impression   Criteria for Skilled Therapeutic Intervention Yes, treatment indicated   PT Diagnosis (PT) Impaired functional mobility   Influenced by the following impairments Decreased strength, imapired sitting balance, decreased activity tolerance   Functional limitations due to impairments Impaired gait and inability to complete ADL's   Clinical Presentation (PT Evaluation Complexity) Stable/Uncomplicated   Clinical Presentation Rationale Clinical judgment   Clinical Decision Making (Complexity) low complexity   Planned Therapy Interventions (PT)  balance training;bed mobility training;gait training;home exercise program;neuromuscular re-education;postural re-education;patient/family education;ROM (range of motion);stair training;strengthening;stretching;transfer training;wheelchair management/propulsion training;progressive activity/exercise;home program guidelines   Anticipated Equipment Needs at Discharge (PT) wheelchair;walker, rolling   Risk & Benefits of therapy have been explained evaluation/treatment results reviewed;care plan/treatment goals reviewed;risks/benefits reviewed;current/potential barriers reviewed;participants voiced agreement with care plan;participants included;patient   PT Total Evaluation Time   PT Eval, Low Complexity Minutes (41867) 10   Physical Therapy Goals   PT Frequency 5x/week   PT Predicted Duration/Target Date for Goal Attainment 08/18/23   PT Goals Bed Mobility;Transfers   PT: Bed Mobility Minimal assist;Supine to/from sit;Rolling;Bridging   PT: Transfers Minimal assist;Sit to/from stand;Bed to/from chair;Assistive device   Interventions   Interventions Quick Adds Therapeutic Activity   Therapeutic Activity   Therapeutic Activities: dynamic activities to improve functional performance Minutes (24147) 12   Symptoms Noted During/After Treatment Fatigue   Treatment Detail/Skilled Intervention Evaluation completed and treatment indicated. Supine>sitting EOB completed w/ Mod Ax1, pt cued to reach for bed rail w/ R UE and bridge R LE to begin scooting towards EOB. Pt required assist to move L Ue and LE. Once sitting EOB, pt demonstrated L sided trunk lean and retrolean requiring Mod-Max Ax1 to maintain sitting balance. Pt given multiple cues to use R UE and B LE on the ground to maintain upright posture. Pt able to bring trunk forward for a few seconds before requiring assist again. Pt attempted R sided reaches, unable to recover from R sided lean back to midline. Pt required Mod Ax1 to bring L LE back in to bed and reposition pt  in bed. Pt left in supine with all needs in reach and RN notified of pt progress.   PT Discharge Planning   PT Plan Sitting balance at EOB, sit>stand with che steady if appropriate   PT Discharge Recommendation (DC Rec) Transitional Care Facility   PT Rationale for DC Rec Pt is moving significanly below baseline mobility from chart review. Pt is currently requiring Max Ax1 in order to maintain sitting balance at EOB and is unable to stand at this time. Pt was at a TCU prior to hospital admission where he was recovering from a stroke. Recommend that pt return to TCU in order to continue inpatient rehabilitation prior to returning home.   PT Brief overview of current status Mod Ax1 bed mobility, intermittent Mod-Max Ax1 sitting balance at EOB.   Total Session Time   Timed Code Treatment Minutes 12   Total Session Time (sum of timed and untimed services) 22                                                                                   [facility]      OUTPATIENT PHYSICAL THERAPY EVALUATION  PLAN OF TREATMENT FOR OUTPATIENT REHABILITATION  (COMPLETE FOR INITIAL CLAIMS ONLY)  Patient's Last Name, First Name, M.I.  YOB: 1970  Tiffany Lenz                           Provider's Name  [facility] Medical Record No.  [id number]                               Onset Date:  08/13/23   Start of Care Date:         Type:     _X_PT   ___OT   ___SLP Medical Diagnosis:                           PT Diagnosis:  Impaired functional mobility   Visits from SOC:  1   _________________________________________________________________________________  Plan of Treatment/Functional Goals    Planned Interventions: balance training, bed mobility training, gait training, home exercise program, neuromuscular re-education, postural re-education, patient/family education, ROM (range of motion), stair training, strengthening, stretching, transfer training, wheelchair  management/propulsion training, progressive activity/exercise, home program guidelines     Goals: See Physical Therapy Goals on Care Plan in [facility] electronic health record.    Therapy Frequency: 5x/week  Predicted Duration of Therapy Intervention: 08/18/23  _________________________________________________________________________________    I CERTIFY THE NEED FOR THESE SERVICES FURNISHED UNDER        THIS PLAN OF TREATMENT AND WHILE UNDER MY CARE     (Physician attestation of this document indicates review and certification of the therapy plan).               ,      Referring Physician: Linh Burris MD            Initial Assessment        See Physical Therapy evaluation dated   in Epic electronic health record.

## 2023-08-13 NOTE — CONSULTS
VASCULAR SURGERY INPATIENT CONSULTATION / Initial In-Patient visit    VASCULAR SURGEON: Dr Falk    LOCATION: [facility]    Tiffany Her  Medical Record #:  [id number]  YOB: 1970  Age:  52 year old     Date of Service: 8/13/2023    PRIMARY CARE PROVIDER: Carolyne Anderson    Reason for consultation:  infrarenal AAA, abdominal pain    IMPRESSION:  Pt with evidence of infrarenal AAA, 5.4 cm, on CT scan performed for abdominal pain, also with hx of TEVAR for penetrating thoracic aortic ulcer in 2018. Noted to have gastroenteritis on admission with likely incidental finding of AAA on imaging and exam, no concerns for impending rupture or infection around aorta at this time based on imaging review and physical exam.     RECOMMENDATION:  No intervention indicated for AAA at this time. Continue treatment for gastroenteritis. Will arrange follow up CTA in 6 months to re evaluate size of aneurysm at that time. Cont ASA, plavix, and high dose statin.     HPI:  Tiffany Lenz is a 52 year old male who was seen today in consultation for AAA. Pt is a 53 yo male with hx of DM, CVA with left hemiparesis and severe dysarthria, penetrating aortic ulcer s/p repair with TEVAR in 2018 by Dr Monique and known AAA who presented to OSH with abdominal pain and diarrhea. CT scan noted stable 5.4cm AAA compared to January 2023 without any concerning features of impending rupture or infection. Pt was admitted for IV fluids and supportive care, vascular surgery consulted for AAA eval.     No family at bedside, exam very limited by dysartria but pt only noting minimal LLQ abdominal pain at time of exam.     PHH:    Past Medical History:   Diagnosis Date    Aortic dissection (H)     Depression     Diabetes mellitus type 2 in nonobese (H)     HLD (hyperlipidemia)     HTN (hypertension)     Pneumonia     Respiratory failure (H)          Past Surgical History:   Procedure Laterality Date    ENDOVASCULAR REPAIR ANEURYSM ABDOMINAL  "AORTA N/A 2/13/2018    Procedure: ENDOVASCULAR REPAIR ANEURYSM ABDOMINAL AORTA;  thoracic endovascular repair of aorta distal to left subclavian artery.  Intravascular ultrasouns of aortic arch, thoracic aorta, and abdominal aorta.  ultrasound guided vascular accessx2, bilateral lower extremity arterial duplex limited study, percutaneous closure of endovascular access x1.  aorta catheterization x1.;  Surgeon: Renata Monique        ALLERGIES:   No Known Allergies     MEDS:  Confirmed in MAR     SOCIAL HABITS: Non smoker    REVIEW OF SYSTEMS:    A 12 point ROS was reviewed and except for what is listed in the HPI above, all others are negative    PE:    Vital signs:  Temp: 97.8  F (36.6  C) Temp src: Oral BP: (!) 158/68 Pulse: 67   Resp: 18 SpO2: 98 % O2 Device: None (Room air)        Estimated body mass index is 28.13 kg/m  as calculated from the following:    Height as of 8/12/23: 1.448 m (4' 9\").    Weight as of 8/12/23: 59 kg (130 lb).       Wt Readings from Last 1 Encounters:   08/12/23 59 kg (130 lb)     There is no height or weight on file to calculate BMI.    EXAM:  GENERAL: This is a well-developed 52 year old male who appears his stated age  CARDIAC:  RRR  CHEST/LUNG:  normal work of breathing on nasal cannula  GASTROINTESINAL (ABDOMEN):soft, non tender on exam today, no rebound tenderness, guarding, or rigidity. Palpable left sided abdominal aortic aneurysm non tender on palpation.   MUSCULOSKELETAL: Grossly normal and both lower extremities are intact.  HEME/LYMPH: No lymphedema  NEUROLOGIC: Left hemiparesis.  PSYCH: appropriate affect  INTEGUMENT: No open lesions or ulcers  VASCULAR: Stron 2+ Dp and PT bilaterally    DIAGNOSTIC STUDIES:     Images:  CTA Chest Abdomen Pelvis w Contrast    IMPRESSION: 1.  Overall no significant change in the appearance of the aorta compared with 01/27/2023. Negative for acute dissection. Negative for aneurysm rupture, leak or other aortic findings. 2.  Stable appearance to a " patent descending thoracic aorta stent graft. 3.  Mild aneurysmal dilatation of the distal descending thoracic aorta at 3.4 cm, unchanged. Aneurysmal dilatation of the distal abdominal aorta measuring up to 5.4 cm also unchanged. 4.  No acute findings in the chest. 5.  Severe coronary artery calcification. 6.  Much of the bowel is fluid-filled although normal in caliber. This appearance is nonspecific but can be seen with a diarrheal state or nonspecific gastroenteritis. Clinical correlation. No obvious bowel wall thickening or acute inflammatory bowel changes. 7.  Moderate compression fracture involving the L1 vertebral body. The degree of compression has increased since 01/27/2023. Clinical correlation.    LABS:      WBC 9.7  Hgb 14.1  K 4.2  Lactic acid 1.6 from 2.5      Total time spent 20 minutes face to face with patient with more than 50% time spent in counseling and coordination of care.    Stefani Estes [facility] Vascular Surgery       STAFF: Reviewed with Dr. Estes including CT scan just performed compared to prior studies.  Stable nonruptured 5.4 cm infrarenal abdominal aneurysm.  This has been followed by  with serial exams.  With the patient's significant comorbidities including his marked left-sided weakness from his stroke we feel that we can follow the aneurysm and recommend intervention only if the aneurysm should increase in size.    Danielito Falk MD

## 2023-08-13 NOTE — ED NOTES
Attempted to call pt's sister and update on transport to [facility], unable to reach. Voicemail left to call back.

## 2023-08-13 NOTE — PROGRESS NOTES
Observation goals  PRIOR TO DISCHARGE        Comments: -diagnostic tests and consults completed and resulted : Partially Met, pending PT consult  -vital signs normal or at patient baseline: Partially Met  -tolerating oral intake to maintain hydration: Partially Met, needs assistance, on IVF  -adequate pain control on oral analgesics : Partially Met  Nurse to notify provider when observation goals have been met and patient is ready for discharge.

## 2023-08-13 NOTE — PROGRESS NOTES
Patient was admitted overnight by my colleague Dr. Rivera with complaints of abdominal pain, nausea, vomiting and LLQ pulsatile mass.  Likely has gastroenteritis and started on supportive cares.  Vascular surgery saw the patient for the pulsatile mass, noted infrarenal AAA that appears stable.  No intervention needed at this time.  Will need follow-up CTA in 6 months to reevaluate size of aneurysm at that time.  Continue aspirin, Plavix and high-dose statin.  Continue treating gastroenteritis.  His symptoms were improved, he denied any nausea or vomiting today.  Anticipate discharge home tomorrow.

## 2023-08-13 NOTE — PROVIDER NOTIFICATION
MD Notification    Notified Person: MD    Notified Person Name:   Denise    Notification Date/Time:08/13/2023 at 03L27    Notification Interaction: AMCOM    Purpose of Notification: pt arrived from [facility] (direct admit). Awaiting orders    Orders Received:    Comments:

## 2023-08-13 NOTE — ED PROVIDER NOTES
EMERGENCY DEPARTMENT ENCOUnter      NAME: Tiffany Her  AGE: 52 year old male  YOB: 1970  MRN: [id number]  EVALUATION DATE & TIME: 8/12/2023 10:47 PM    PCP: Carolyne Anderson    ED PROVIDER: Leda Camilo MD      Chief Complaint   Patient presents with    Abdominal Pain    Mass         FINAL IMPRESSION:  1. Left lower quadrant abdominal pain    2. Infrarenal abdominal aortic aneurysm (AAA) without rupture (H)          ED COURSE & MEDICAL DECISION MAKING:      In summary, the patient is a 52-year-old male that presents to the emergency department for evaluation of left sided abdominal pain and a pulsatile mass.  He has a stable, unruptured AAA.  Consultation with vascular surgery advised admission for further observation secondary to his new abdominal pain.  We will transfer to [facility] for further care and evaluation.    11:15 PM I met with the patient to gather history and to perform my initial exam. We discussed plans for the ED course, including diagnostic testing and treatment.  Morphine 4 mg IV was administered for pain.  Zofran 4 mg IV was administered for nausea.  Normal saline 500 mL bolus was administered for IV hydration.  Previous medical records were reviewed including abdominal CT performed on January 27, 2023 which revealed a 5.4 cm AAA.  12:48 AM Rechecked and updated patient.  Evaluation reveals that his pain is improved in the emergency department.  Discussed case with vascular surgery, Dr. Brown, and he recommends admission for further observation and evaluation.  He recommends transfer to [facility].  0130-discussed case with [facility] hospitalist, Dr. Walker, and he accepts patient for transfer.    Medical Decision Making    History:  Supplemental history from: Documented in chart, if applicable  External Record(s) reviewed: Documented in chart, if applicable.    Work Up:  Chart documentation includes differential considered and any EKGs or imaging  independently interpreted by provider, where specified.  In additional to work up documented, I considered the following work up: Documented in chart, if applicable.    External consultation:  Discussion of management with another provider: Documented in chart, if applicable    Complicating factors:  Care impacted by chronic illness: Cerebrovascular Disease, Diabetes, Hyperlipidemia, and Hypertension  Care affected by social determinants of health: N/A    Disposition considerations:  transfer to [facility] since vascular surgery is available if needed      At the conclusion of the encounter I discussed the results of all of the tests and the disposition. The questions were answered. The patient or family acknowledged understanding and was agreeable with the care plan.         MEDICATIONS GIVEN IN THE EMERGENCY:  Medications   0.9% sodium chloride BOLUS (0 mLs Intravenous Stopped 8/13/23 0135)   morphine (PF) injection 4 mg (4 mg Intravenous $Given 8/12/23 2329)   ondansetron (ZOFRAN) injection 4 mg (4 mg Intravenous $Given 8/12/23 2329)   iopamidol (ISOVUE-370) solution 100 mL (90 mLs Intravenous $Given 8/12/23 5879)       NEW PRESCRIPTIONS STARTED AT TODAY'S ER VISIT  Discharge Medication List as of 8/13/2023  2:48 AM             =================================================================    HPI  Tiffany Her is a 52 year old male with a pertinent medical history of aortic aneurysm, thoracoabdominal aorta dissection, penetrating ulcer of aorta, JOSEPH, T2DM, HTN, hypertensive emergency, HLD, acute respiratory failure with hypoxia, thrombocytopenia, CVA involving middle cerebral artery territory, dysarthria due to acute stroke, posterior reversible encephalopathy syndrome, left hemiparesis, obesity, acute alcoholic intoxication with delirium, who presents to this ED EMS for evaluation of abdominal pain.    Per Chart Review, patient was admitted to [facility] on 06/28/23 until 07/05/23 initially  presenting with left sided weakness and left sided pain, as well as abdominal pain, nausea, and ongoing chest pain/dyspnea. Acute ischemic stroke seen in the right thalamus, right cerebral peduncle and other right sided areas. TPA was not administered due to >4 hrs to symptom onset (left sided neglect, right gaze deviation, LUE weakness) and per Neuro crit patient was not a candidate for neuroendovascular therapy. Neurology was consulted and recommended continuing aspirin and plavix for 3 months, with plan for ASA81 after. Concern that the stroke was due to medication non-compliance leading to an ischemic stroke. Patient had a bubble study that was negative. For blood pressure control after allowing for permissive hypertension, he was started on his PTA lisinopril which was eventually increased to 40mg daily. He was also continued on his PTA statin after admission. PT/OT were consulted and recommended placement at TCU for continued rehab and therapies. After discharge from TCU, he should follow up with neurology and his PCP. For his PCP follow up, we would recommend considering a holter monitor for further evaluation.     Patient's wifereports today that she went to visit the patient who is currently at a TCU when she was told by him that he has been having left lower quadrant abdominal pain since yesterday that rates a 6-7/10 in intensity with an associated pulsatile mass, as well as associated vomiting and diarrhea. She notes the patient denies any recent fever, or any other patient complications at this time. She endorses a patient PMH of stroke, T2DM, and HTN. She denies any known patient allergies to medications.       REVIEW OF SYSTEMS   Constitutional:  Denies fever or chills  HENT:  Denies sore throat   Respiratory:  Denies cough or shortness of breath   Cardiovascular:  Denies chest pain or palpitations  GI:  Positive for abdominal pain (LLQ), abdominal pulsatile mass (LLQ), vomiting, and diarrhea.    Musculoskeletal:  Denies any new extremity pain   Skin:  Denies rash   Neurologic:  Denies headache, focal weakness or sensory changes    All other systems reviewed and are negative      PAST MEDICAL HISTORY:  Past Medical History:   Diagnosis Date    Aortic dissection (H)     Depression     Diabetes mellitus type 2 in nonobese (H)     HLD (hyperlipidemia)     HTN (hypertension)     Pneumonia     Respiratory failure (H)        PAST SURGICAL HISTORY:  Past Surgical History:   Procedure Laterality Date    ENDOVASCULAR REPAIR ANEURYSM ABDOMINAL AORTA N/A 2/13/2018    Procedure: ENDOVASCULAR REPAIR ANEURYSM ABDOMINAL AORTA;  thoracic endovascular repair of aorta distal to left subclavian artery.  Intravascular ultrasouns of aortic arch, thoracic aorta, and abdominal aorta.  ultrasound guided vascular accessx2, bilateral lower extremity arterial duplex limited study, percutaneous closure of endovascular access x1.  aorta catheterization x1.;  Surgeon: Renata Monique       CURRENT MEDICATIONS:    No current outpatient medications on file.      ALLERGIES:  No Known Allergies    FAMILY HISTORY:  No family history on file.    SOCIAL HISTORY:   Social History     Socioeconomic History    Marital status: Single     Spouse name: None    Number of children: None    Years of education: None    Highest education level: None   Tobacco Use    Smoking status: Some Days    Smokeless tobacco: Never   Vaping Use    Vaping Use: Never used   Substance and Sexual Activity    Alcohol use: No    Drug use: No   Social History Narrative    No kids       VITALS:  Patient Vitals for the past 24 hrs:   BP Temp Temp src Pulse Resp SpO2 Height Weight   08/13/23 0230 136/72 -- -- 78 13 96 % -- --   08/13/23 0157 127/73 -- -- 76 15 98 % -- --   08/13/23 0142 (!) 140/76 -- -- 76 16 97 % -- --   08/13/23 0135 (!) 140/82 -- -- 77 15 98 % -- --   08/13/23 0120 (!) 141/78 -- -- 79 15 98 % -- --   08/13/23 0105 128/72 -- -- 77 15 97 % -- --   08/13/23 0050  "139/75 -- -- 78 17 97 % -- --   08/13/23 0035 132/72 -- -- 79 15 97 % -- --   08/12/23 2350 (!) 144/84 -- -- 80 17 98 % -- --   08/12/23 2335 (!) 143/85 -- -- 84 16 95 % -- --   08/12/23 2320 129/84 -- -- 100 -- 97 % -- --   08/12/23 2249 (!) 151/90 98.5  F (36.9  C) Oral 95 16 99 % 1.448 m (4' 9\") 59 kg (130 lb)       PHYSICAL EXAM    Constitutional:  Well developed, Well nourished,  HENT:  Normocephalic, Atraumatic, Bilateral external ears normal, Oropharynx moist, Nose normal.   Neck:  Normal range of motion, No meningismus, No stridor.   Eyes:  EOMI, Conjunctiva normal, No discharge.   Respiratory:  Normal breath sounds, No respiratory distress, No wheezing, No chest tenderness.   Cardiovascular:  Normal heart rate, Normal rhythm, No murmurs  GI:  Soft, tenderness left mid abdomen, No guarding, 6 cm pulsatile mass noted just lateral and superior to umbilicus  Musculoskeletal:  Neurovascularly intact distally, No edema, No tenderness, No cyanosis, Good range of motion in all major joints.   Integument:  Warm, Dry, No erythema, No rash.   Lymphatic:  No lymphadenopathy noted.   Neurologic:  Alert & oriented x 3, Normal motor function, Normal sensory function, No focal deficits noted.   Psychiatric:  Affect normal, Judgment normal, Mood normal.      LAB:  All pertinent labs reviewed and interpreted.  Results for orders placed or performed during the hospital encounter of 08/12/23   CTA Chest Abdomen Pelvis w Contrast    Impression    IMPRESSION:  1.  Overall no significant change in the appearance of the aorta compared with 01/27/2023. Negative for acute dissection. Negative for aneurysm rupture, leak or other aortic findings.    2.  Stable appearance to a patent descending thoracic aorta stent graft.    3.  Mild aneurysmal dilatation of the distal descending thoracic aorta at 3.4 cm, unchanged. Aneurysmal dilatation of the distal abdominal aorta measuring up to 5.4 cm also unchanged.    4.  No acute findings in " the chest.    5.  Severe coronary artery calcification.    6.  Much of the bowel is fluid-filled although normal in caliber. This appearance is nonspecific but can be seen with a diarrheal state or nonspecific gastroenteritis. Clinical correlation. No obvious bowel wall thickening or acute inflammatory bowel   changes.    7.  Moderate compression fracture involving the L1 vertebral body. The degree of compression has increased since 01/27/2023. Clinical correlation.   Result Value Ref Range    INR 1.08 0.85 - 1.15   Comprehensive metabolic panel   Result Value Ref Range    Sodium 138 136 - 145 mmol/L    Potassium 4.2 3.4 - 5.3 mmol/L    Chloride 103 98 - 107 mmol/L    Carbon Dioxide (CO2) 21 (L) 22 - 29 mmol/L    Anion Gap 14 7 - 15 mmol/L    Urea Nitrogen 19.3 6.0 - 20.0 mg/dL    Creatinine 0.86 0.67 - 1.17 mg/dL    Calcium 9.6 8.6 - 10.0 mg/dL    Glucose 149 (H) 70 - 99 mg/dL    Alkaline Phosphatase 141 (H) 40 - 129 U/L    AST 27 0 - 45 U/L    ALT 25 0 - 70 U/L    Protein Total 7.5 6.4 - 8.3 g/dL    Albumin 4.1 3.5 - 5.2 g/dL    Bilirubin Total 0.4 <=1.2 mg/dL    GFR Estimate >90 >60 mL/min/1.73m2   Result Value Ref Range    Lipase 30 13 - 60 U/L   Lactic acid whole blood   Result Value Ref Range    Lactic Acid 2.5 (H) 0.7 - 2.0 mmol/L   Result Value Ref Range    CRP Inflammation 3.50 <5.00 mg/L   CBC with platelets and differential   Result Value Ref Range    WBC Count 9.7 4.0 - 11.0 10e3/uL    RBC Count 4.54 4.40 - 5.90 10e6/uL    Hemoglobin 14.1 13.3 - 17.7 g/dL    Hematocrit 42.2 40.0 - 53.0 %    MCV 93 78 - 100 fL    MCH 31.1 26.5 - 33.0 pg    MCHC 33.4 31.5 - 36.5 g/dL    RDW 12.7 10.0 - 15.0 %    Platelet Count 233 150 - 450 10e3/uL    % Neutrophils 72 %    % Lymphocytes 18 %    % Monocytes 7 %    % Eosinophils 2 %    % Basophils 1 %    % Immature Granulocytes 0 %    NRBCs per 100 WBC 0 <1 /100    Absolute Neutrophils 7.1 1.6 - 8.3 10e3/uL    Absolute Lymphocytes 1.7 0.8 - 5.3 10e3/uL    Absolute Monocytes  0.7 0.0 - 1.3 10e3/uL    Absolute Eosinophils 0.2 0.0 - 0.7 10e3/uL    Absolute Basophils 0.1 0.0 - 0.2 10e3/uL    Absolute Immature Granulocytes 0.0 <=0.4 10e3/uL    Absolute NRBCs 0.0 10e3/uL   Lactic acid whole blood   Result Value Ref Range    Lactic Acid 1.6 0.7 - 2.0 mmol/L   Result Value Ref Range    Magnesium 1.6 (L) 1.7 - 2.3 mg/dL       RADIOLOGY:  I have independently reviewed and interpreted the above imaging, pending the final radiology read.  CTA Chest Abdomen Pelvis w Contrast   Final Result   IMPRESSION:   1.  Overall no significant change in the appearance of the aorta compared with 2023. Negative for acute dissection. Negative for aneurysm rupture, leak or other aortic findings.      2.  Stable appearance to a patent descending thoracic aorta stent graft.      3.  Mild aneurysmal dilatation of the distal descending thoracic aorta at 3.4 cm, unchanged. Aneurysmal dilatation of the distal abdominal aorta measuring up to 5.4 cm also unchanged.      4.  No acute findings in the chest.      5.  Severe coronary artery calcification.      6.  Much of the bowel is fluid-filled although normal in caliber. This appearance is nonspecific but can be seen with a diarrheal state or nonspecific gastroenteritis. Clinical correlation. No obvious bowel wall thickening or acute inflammatory bowel    changes.      7.  Moderate compression fracture involving the L1 vertebral body. The degree of compression has increased since 2023. Clinical correlation.          EK-is 82, sinus, there is no ST segment elevation or depression appreciated prolonged QT        I, Chris Chavarria, am serving as a scribe to document services personally performed by Dr. Camilo based on my observation and the provider's statements to me. I, Leda Camilo MD attest that Chris Chavarria is acting in a scribe capacity, has observed my performance of the services and has documented them in accordance with my  direction.    Leda Camilo MD  Emergency Medicine  [facility] EMERGENCY DEPARTMENT  [facility] [facility] [id number] [phone]  Dept: [phone]       Leda Camilo MD  08/13/23 042

## 2023-08-13 NOTE — PHARMACY-ADMISSION MEDICATION HISTORY
Pharmacist Admission Medication History    Admission medication history is complete. The information provided in this note is only as accurate as the sources available at the time of the update.    Medication reconciliation/reorder completed by provider prior to medication history? No    Information Source(s): Facility (U/NH/) medication list/MAR and CareEverywhere/SureScripts via N/A    Pertinent Information: Med hx completed using MAR from [facility]    Changes made to PTA medication list:  Added: amlodipine, nicotine patch, aspirin 81 mg  Deleted: Bydureon, lisinopril, nicotine gum  Changed: metformin, fluoxetine  Marked as not taking: aspirin 325 mg, clopidogrel (During 6/3 admission at [facility], neuro recommended x 3 months although patient was hospitalized at [facility] 6/28-7/5 and discharged on aspirin 81 mg. Not removed from list because refills still remain)    Medication Affordability:  Not including over the counter (OTC) medications, was there a time in the past 3 months when you did not take your medications as prescribed because of cost?: Unable to Assess    Allergies reviewed with patient and updates made in EHR: yes (matches with [facility] records)    Medication History Completed By: Marjorie Stevenson RPH 8/13/2023 8:23 AM    Prior to Admission medications    Medication Sig Last Dose Taking? Auth Provider Long Term End Date   amLODIPine (NORVASC) 5 MG tablet Take 5 mg by mouth daily 8/12/2023 at 0800 Yes Unknown, Entered By History     aspirin (ASA) 81 MG chewable tablet Take 81 mg by mouth daily 8/12/2023 at 1800 Yes Unknown, Entered By History     atorvastatin (LIPITOR) 80 MG tablet Take 80 mg by mouth every evening 8/12/2023 at 1800 Yes Unknown, Entered By History Yes    FLUoxetine 20 MG tablet Take 60 mg by mouth daily 8/12/2023 at 0800 Yes Unknown, Entered By History     metFORMIN (GLUCOPHAGE XR) 500 MG 24 hr tablet Take 1,000 mg by mouth daily (with dinner) 8/12/2023 at 1800 Yes Unknown,  Entered By History Yes    nicotine (NICODERM CQ) 14 MG/24HR 24 hr patch Place 1 patch onto the skin every 24 hours 8/12/2023 at [employer] 0800 - not removed Yes Unknown, Entered By History     aspirin (ASA) 325 MG tablet 1 tablet (325 mg) by Oral or Feeding Tube route daily  Patient not taking: Reported on 8/13/2023 Not Taking  Roshan Stoddard NP     blood glucose (NO BRAND SPECIFIED) test strip as directed AccuChek Guide Test Strips daily for 30 days   Reported, Patient     clopidogrel (PLAVIX) 75 MG tablet 1 tablet (75 mg) by Oral or Feeding Tube route daily  Patient not taking: Reported on 8/13/2023 Not Taking  Roshan Stoddard NP Yes

## 2023-08-13 NOTE — H&P
[facility]    History and Physical - Hospitalist Service       Date of Admission:  8/13/2023    Assessment & Plan      Tiffany Lenz is a 52 year old male admitted on 8/13/2023. He presents with abdominal pain    Note: history limited 2/2 pt's dysarthria with phone  use    Gastroenteritis  Presented to [facility] with c/o abdominal pain and pulsatile mass. Pt's wife went to visit pt at TCU and he was having LLQ abdominal pain, n/v and pulsatile mass. In ED afebrile, VSS. Lactic 2.5->1.6. other labs wnl. CT a/p w/ evidence of gastroenteritis, other findings as below. Now bowel thickening or inflammation noted.   - IV fluids  - regular diet  - supportive cares    Aortic aneurysm. chronic  Hx abdominal aorta repair  Pt noted possible pulsatile mass in abdomen on presentation. CT on presentation notes no significant change in appearance of aorta compared w/ 1/2023 imaging. No dissection, leak, rupture or other findings.   *vascular surgery at referring hospital requested transfer to [facility] for close monitoring and vascular surgery involvement. He does have a pulsatile mass in LLQ correlating with aneurysm on CT  - Vascular surgery consult    Hx CVA  [Needs rec-  mg daily, plavix 75 mg daily, atorvastatin 80 mg daily]  Recent hospitalization for tongue numbness and slurred speech. Found to have 4 mm acute/ subacute infarcts (see report). Vascular disease/ occlusions seen as well (see MRI 6/2023).   - resume  gm daily, plavix 75 mg daily  - hold statin in obs    DM II  [Needs rec- exenatide 2 mg q7 days, metformin 1000 mg BID]  A1C 6/4/2023 at 9.6%.   - TID and HS blood sugars  - med sliding scale insulin  - hold exenatide and metformin in obs    Chronic HFrEF  Hypertension  Incidentally found to have EF 45-50% during his stroke workup, wma noted.   - resume lisinopril with rec       Diet: Regular Diet Adult    DVT Prophylaxis: Pneumatic Compression Devices  Robles  "Catheter: Not present  Lines: None     Cardiac Monitoring: None  Code Status: Full Code      Clinically Significant Risk Factors Present on Admission            # Hypomagnesemia: Lowest Mg = 1.6 mg/dL in last 2 days, will replace as needed       # Drug Induced Platelet Defect: home medication list includes an antiplatelet medication     # Hypertension: Home medication list includes antihypertensive(s)     # DMII: A1C = 9.6 % (Ref range: <5.7 %) within past 6 months      # Overweight: Estimated body mass index is 28.13 kg/m  as calculated from the following:    Height as of 8/12/23: 1.448 m (4' 9\").    Weight as of 8/12/23: 59 kg (130 lb).            Disposition Plan           Ethan Rivera MD  Hospitalist Service  [facility]  Securely message with [employer] (more info)  Text page via [facility] Paging/Directory     ______________________________________________________________________    Chief Complaint   Abdominal pain, pulsatile abdominal mass    History is obtained from the patient, electronic health record, and emergency department physician    History of Present Illness   Tiffany Her is a 52 year old male who presents with left lower quadrant abdominal pain and a pulsatile abdominal mass.  Note patient is dysarthric secondary to stroke and a phone  was used.  History is limited secondary to this.  Patient was hospitalized in June 2023 after having a stroke.  He currently lives in a care facility.  Today's wife visited him and the patient was complaining of left lower quadrant pain as well as a pulsatile mass in his abdomen.  Here the patient confirms that he has left lower quadrant pain.  He states that happen since last night.  He is also noted this pulsatile mass.  He has had diarrhea.  He has had nausea but no vomiting.  He states he occasionally has chest pain and shortness of breath as well.  He does not have chest pain currently.  He denies other abdominal pain.      Past " Medical History    Past Medical History:   Diagnosis Date    Aortic dissection (H)     Depression     Diabetes mellitus type 2 in nonobese (H)     HLD (hyperlipidemia)     HTN (hypertension)     Pneumonia     Respiratory failure (H)        Past Surgical History   Past Surgical History:   Procedure Laterality Date    ENDOVASCULAR REPAIR ANEURYSM ABDOMINAL AORTA N/A 2018    Procedure: ENDOVASCULAR REPAIR ANEURYSM ABDOMINAL AORTA;  thoracic endovascular repair of aorta distal to left subclavian artery.  Intravascular ultrasouns of aortic arch, thoracic aorta, and abdominal aorta.  ultrasound guided vascular accessx2, bilateral lower extremity arterial duplex limited study, percutaneous closure of endovascular access x1.  aorta catheterization x1.;  Surgeon: Renata Monique       Prior to Admission Medications   Prior to Admission Medications   Prescriptions Last Dose Informant Patient Reported? Taking?   FLUoxetine (PROZAC) 20 MG capsule   Yes No   Sig: Take 20 mg by mouth daily Along with 40mg for total 60mg daily   FLUoxetine (PROZAC) 40 MG capsule   Yes No   Sig: Take 40 mg by mouth daily Along with 20mg for total 60mg daily   aspirin (ASA) 325 MG tablet   No No   Si tablet (325 mg) by Oral or Feeding Tube route daily   atorvastatin (LIPITOR) 80 MG tablet   Yes No   Sig: Take 80 mg by mouth every evening   blood glucose (NO BRAND SPECIFIED) test strip   Yes No   Sig: as directed [employer] Guide Test Strips daily for 30 days   clopidogrel (PLAVIX) 75 MG tablet   No No   Si tablet (75 mg) by Oral or Feeding Tube route daily   exenatide ER (BYDUREON BCISE) 2 MG/0.85ML auto-injector   No No   Sig: Inject 2 mg Subcutaneous every 7 days   lisinopril (ZESTRIL) 20 MG tablet   No No   Sig: Take 1 tablet (20 mg) by mouth daily   metFORMIN (GLUCOPHAGE XR) 500 MG 24 hr tablet   No No   Sig: Take 2 tablets (1,000 mg) by mouth 2 times daily (with meals)   nicotine (NICORETTE) 2 MG gum   No No   Sig: Place 1 each (2 mg)  inside cheek every hour as needed for smoking cessation      Facility-Administered Medications: None        Review of Systems    The 10 point Review of Systems is negative other than noted in the HPI or here.      Physical Exam   Vital Signs: Temp: 97.9  F (36.6  C) Temp src: Oral BP: 130/85 Pulse: 65   Resp: 18 SpO2: 98 % O2 Device: None (Room air)    Weight: 0 lbs 0 oz    General Appearance: Alert, no distres  Respiratory: CTA B  Cardiovascular: RRR, no murmur. No edema  GI: soft, tender LLQ. Pulsatile mass in LLQ  Skin: no rashes or lesions grossly    Other: dysarthric     Medical Decision Making       60 MINUTES SPENT BY ME on the date of service doing chart review, history, exam, documentation & further activities per the note.      Data     I have personally reviewed the following data over the past 24 hrs:    9.7  \   14.1   / 233     138 103 19.3 /  149 (H)   4.2 21 (L) 0.86 \     ALT: 25 AST: 27 AP: 141 (H) TBILI: 0.4   ALB: 4.1 TOT PROTEIN: 7.5 LIPASE: 30     Procal: N/A CRP: 3.50 Lactic Acid: 1.6       INR:  1.08 PTT:  N/A   D-dimer:  N/A Fibrinogen:  N/A       Imaging results reviewed over the past 24 hrs:   Recent Results (from the past 24 hour(s))   CTA Chest Abdomen Pelvis w Contrast    Narrative    EXAM: CTA CHEST ABDOMEN PELVIS W CONTRAST  LOCATION: [facility]  DATE: 8/13/2023    INDICATION: Abdominal pian and pulsatile mass.  COMPARISON: 01/27/2023.  TECHNIQUE: CT angiogram chest abdomen pelvis during arterial phase of injection of IV contrast. 2D and 3D MIP reconstructions were performed by the CT technologist. Dose reduction techniques were used.   CONTRAST: 90 mL Iso-370.    FINDINGS:   CT ANGIOGRAM CHEST, ABDOMEN, AND PELVIS: No evidence for acute aortic dissection or other acute aortic abnormality. Prominent diffuse atheromatous disease is present. Ascending thoracic aorta is normal in caliber at 3.5 cm. Great vessels arising off the   aortic arch are patent without  stenosis. Patent stent graft involving the descending thoracic aorta is stable in appearance. Mild aneurysmal dilatation of the distal descending thoracic aorta at the hiatus measuring 3.4 cm, unchanged. Celiac, superior   mesenteric, and bilateral renal arteries are patent without stenosis. AYLIN is patent. Aneurysmal dilatation of the distal abdominal aorta measuring up to 5.4 cm. This is unchanged. No evidence for aneurysm rupture or leak. Prominent amount of mural   thrombus associated with the distal abdominal aortic aneurysm. Bilateral iliac arteries are patent with moderate atheromatous narrowing in the left proximal common iliac artery, both the external and internal iliac arteries on the left at the   bifurcation, and in the right internal iliac artery proximally.    LUNGS AND PLEURA: No acute-appearing infiltrates or consolidation. Tiny 2 mm nodule in the right lung anteriorly on series 7 image 162 is unchanged. No pleural effusions.    MEDIASTINUM/AXILLAE: Normal.    CORONARY ARTERY CALCIFICATION: Severe.    HEPATOBILIARY: Normal.    PANCREAS: Normal.    SPLEEN: Normal.    ADRENAL GLANDS: Normal.    KIDNEYS/BLADDER: Few small benign renal cysts with no specific follow-up needed. Kidneys otherwise negative. No hydronephrosis. Bladder is unremarkable.    BOWEL: Bowel is normal in caliber with no evidence for obstruction. Normal appendix. Much of the bowel is fluid-filled which can be seen with a nonspecific diarrheal state or gastroenteritis. Clinical correlation. No obvious bowel wall thickening or   acute inflammatory bowel changes.    LYMPH NODES: Normal.    PELVIC ORGANS: Normal.    MUSCULOSKELETAL: Moderate compression fracture involving the L1 vertebral body. The degree of compression has increased since the 01/27/2023 exam. Clinical correlation.      Impression    IMPRESSION:  1.  Overall no significant change in the appearance of the aorta compared with 01/27/2023. Negative for acute dissection.  Negative for aneurysm rupture, leak or other aortic findings.    2.  Stable appearance to a patent descending thoracic aorta stent graft.    3.  Mild aneurysmal dilatation of the distal descending thoracic aorta at 3.4 cm, unchanged. Aneurysmal dilatation of the distal abdominal aorta measuring up to 5.4 cm also unchanged.    4.  No acute findings in the chest.    5.  Severe coronary artery calcification.    6.  Much of the bowel is fluid-filled although normal in caliber. This appearance is nonspecific but can be seen with a diarrheal state or nonspecific gastroenteritis. Clinical correlation. No obvious bowel wall thickening or acute inflammatory bowel   changes.    7.  Moderate compression fracture involving the L1 vertebral body. The degree of compression has increased since 01/27/2023. Clinical correlation.

## 2023-08-13 NOTE — PLAN OF CARE
Goal Outcome Evaluation:  Orientation/Cognitive: A/O, assessment limited d/t dysarthria,  used  Observation Goals (Met/ Not Met): Not met  Mobility Level/Assist Equipment: Ax2/lift, Baseline left sided weakness  Fall Risk (Y/N): Y  Behavior Concerns: None  Pain Management: Minimal abd pain, refused interventions   Tele/VS/O2: VSS on RA  ABNL Lab/BG: Mag 1.6, refer to imaging results   Diet: Regular  Bowel/Bladder: Incontinent at times, needs help w/ urinal  Skin Concerns: None  Drains/Devices: IVF  Tests/Procedures for next shift: PT/Vascular surg  Anticipated DC date & active delays: TBD  Patient Stated Goal for Today: None     Observation goals  PRIOR TO DISCHARGE        Comments:   -diagnostic tests and consults completed and resulted Not met  -vital signs normal or at patient baseline Met  -tolerating oral intake to maintain hydration Met  -adequate pain control on oral analgesics Met  Nurse to notify provider when observation goals have been met and patient is ready for discharge.

## 2023-08-13 NOTE — ED TRIAGE NOTES
Pt arrived via EMS from [facility] for abd pain and pulsating mass. Pt's family was visiting pt and pt reports abd pain in LLQ that started yesterday.   There is tenderness on palpation and pulsation felt.   Pt report diarrhea, n/v, and burning with urination.      Triage Assessment       Row Name 08/12/23 2557       Triage Assessment (Adult)    Airway WDL WDL       Respiratory WDL    Respiratory WDL WDL       Skin Circulation/Temperature WDL    Skin Circulation/Temperature WDL WDL       Cardiac WDL    Cardiac WDL WDL       Peripheral/Neurovascular WDL    Peripheral Neurovascular WDL WDL       Cognitive/Neuro/Behavioral WDL    Cognitive/Neuro/Behavioral WDL WDL

## 2023-08-13 NOTE — PROGRESS NOTES
Orientation/Cognitive: LYNN due to dysarthria   Observation Goals (Met/ Not Met): Not met  Mobility Level/Assist Equipment: A2 lift  Fall Risk (Y/N): Y  Behavior Concerns: green  Pain Management: PRN Oxycodone X1  Tele/VS/O2: no tele, VSS, O2 RA  ABNL Lab/BG: Mg 1.6  Diet: Reg  Bowel/Bladder: incontinent can use urinal if reminded   Skin Concerns: none  Drains/Devices: PIV infusing  mL/hr  Tests/Procedures for next shift: AM Mg recheck  Anticipated DC date & active delays: TBD

## 2023-08-13 NOTE — PLAN OF CARE
Goal Outcome Evaluation:    Patient is Hmong speaking, A&O, not able to assess orientation due to slurred speech, pleasant.  service utilized. Up with assist of 2 and lift. Left side weaknesses. BG within normal range. Assist with feeding. Complains of abdominal pain, managed with oxycodone x1. IVF infusing. Meds with apple sauce. Voiding per urinal, incontinent @ times. Discharge pending.

## 2023-08-13 NOTE — ED NOTES
Bed: JNED-05  Expected date: 8/12/23  Expected time: 10:37 PM  Means of arrival: Ambulance  Comments:  SPF  53 yo M abd pain

## 2023-08-14 ENCOUNTER — APPOINTMENT (OUTPATIENT)
Dept: PHYSICAL THERAPY | Facility: CLINIC | Age: 53
End: 2023-08-14
Attending: HOSPITALIST
Payer: COMMERCIAL

## 2023-08-14 VITALS
HEART RATE: 86 BPM | SYSTOLIC BLOOD PRESSURE: 167 MMHG | RESPIRATION RATE: 16 BRPM | OXYGEN SATURATION: 98 % | TEMPERATURE: 98.3 F | DIASTOLIC BLOOD PRESSURE: 97 MMHG

## 2023-08-14 LAB
GLUCOSE BLDC GLUCOMTR-MCNC: 103 MG/DL (ref 70–99)
GLUCOSE BLDC GLUCOMTR-MCNC: 174 MG/DL (ref 70–99)
GLUCOSE BLDC GLUCOMTR-MCNC: 93 MG/DL (ref 70–99)

## 2023-08-14 PROCEDURE — G0378 HOSPITAL OBSERVATION PER HR: HCPCS

## 2023-08-14 PROCEDURE — 250N000013 HC RX MED GY IP 250 OP 250 PS 637: Performed by: INTERNAL MEDICINE

## 2023-08-14 PROCEDURE — 99239 HOSP IP/OBS DSCHRG MGMT >30: CPT | Performed by: INTERNAL MEDICINE

## 2023-08-14 PROCEDURE — 250N000013 HC RX MED GY IP 250 OP 250 PS 637: Performed by: HOSPITALIST

## 2023-08-14 PROCEDURE — 97530 THERAPEUTIC ACTIVITIES: CPT | Mod: GP

## 2023-08-14 PROCEDURE — 250N000012 HC RX MED GY IP 250 OP 636 PS 637: Performed by: INTERNAL MEDICINE

## 2023-08-14 PROCEDURE — 258N000003 HC RX IP 258 OP 636: Performed by: INTERNAL MEDICINE

## 2023-08-14 PROCEDURE — 82962 GLUCOSE BLOOD TEST: CPT

## 2023-08-14 RX ORDER — MULTIVITAMIN WITH IRON
1 TABLET ORAL DAILY
DISCHARGE
Start: 2023-08-14 | End: 2023-08-19

## 2023-08-14 RX ORDER — AMOXICILLIN 250 MG
1 CAPSULE ORAL 2 TIMES DAILY PRN
DISCHARGE
Start: 2023-08-14

## 2023-08-14 RX ORDER — ACETAMINOPHEN 325 MG/1
650 TABLET ORAL EVERY 6 HOURS PRN
DISCHARGE
Start: 2023-08-14

## 2023-08-14 RX ADMIN — AMLODIPINE BESYLATE 5 MG: 5 TABLET ORAL at 09:14

## 2023-08-14 RX ADMIN — CLOPIDOGREL BISULFATE 75 MG: 75 TABLET ORAL at 09:14

## 2023-08-14 RX ADMIN — ASPIRIN 325 MG ORAL TABLET 325 MG: 325 PILL ORAL at 09:14

## 2023-08-14 RX ADMIN — INSULIN ASPART 1 UNITS: 100 INJECTION, SOLUTION INTRAVENOUS; SUBCUTANEOUS at 12:46

## 2023-08-14 RX ADMIN — SODIUM CHLORIDE: 9 INJECTION, SOLUTION INTRAVENOUS at 00:49

## 2023-08-14 RX ADMIN — FLUOXETINE 60 MG: 20 CAPSULE ORAL at 09:15

## 2023-08-14 ASSESSMENT — ACTIVITIES OF DAILY LIVING (ADL)
ADLS_ACUITY_SCORE: 49
ADLS_ACUITY_SCORE: 55
ADLS_ACUITY_SCORE: 53
ADLS_ACUITY_SCORE: 55
ADLS_ACUITY_SCORE: 55
ADLS_ACUITY_SCORE: 53
ADLS_ACUITY_SCORE: 55
ADLS_ACUITY_SCORE: 55

## 2023-08-14 NOTE — PROGRESS NOTES
Goal Outcome Evaluation:       Plan of Care Reviewed With: patient     Overall Patient Progress: improving     Observation goals  PRIOR TO DISCHARGE        Comments:   -Diagnostic tests and consults completed and resulted-Met    -Vital signs normal or at patient baseline-Met    -Tolerating oral intake to maintain hydration-Met    -Adequate pain control on oral analgesics-Met    Nurse to notify provider when observation goals have been met and patient is ready for discharge.         Summary: Pt here from a TCU with abd pain, gastroenteritis and aortic aneurysm     Care Plan Summary Note:  Orientation: A&O to self only  Observation Goals (met & not met): Met  Activity Level: Ax2, lift, turn and repo q2hr  Fall Risk: Y  Behavior & Aggression Tool Color: Green  Pain Management: No signs of pain   ABNL VS/O2: VSS on RA x HTN occasionally  ABNL Lab/BG: None this shift, 2 failed attempt with lab draws. Pt now refusing.  Diet: Regular diet, carb counting, takes pills one at a time with apple sauce   Bowel/Bladder: Incontinent B/B, good UOP, no BM this shift  Drains/Devices: PIV infusing NS @100mL/hr  Tests/Procedures for next shift: SW consult  Anticipated DC date: discharge today, back to [facility]  Other Important Info: Pt is Hmong speaking, vascular rec no intervention now, CTA ion 6 months

## 2023-08-14 NOTE — PROGRESS NOTES
Care Management Discharge Note    Discharge Date: 08/14/2023       Discharge Disposition: Transitional Care, Skilled Nursing Facility    Discharge Services: None    Discharge DME: None    Discharge Transportation: agency    Private pay costs discussed: Not applicable    Does the patient's insurance plan have a 3 day qualifying hospital stay waiver?  Yes   Will the waiver be used for post-acute placement? Yes    PAS Confirmation Code:  N/A  Patient/family educated on Medicare website which has current facility and service quality ratings: no    Education Provided on the Discharge Plan:  yes  Persons Notified of Discharge Plans: family, MD MOLLY, nursing  Patient/Family in Agreement with the Plan: yes    Handoff Referral Completed: No    Additional Information:  Confirmed with bedside RNFelicia that patient can sit in wheelchair for transport. Writer called [facility] and spoke with Han. Wheelchair ride set for [phone]. Writer called [facility] with Javi to update facility on transport time.  left. Orders received and faxed via epic. Writer updated charge RN. Writer called and updated sisterYazan on discharge plans who is agreeable.       Ranjana Delaney, BRIDGER, LGSW   Social Work   [facility]

## 2023-08-14 NOTE — CONSULTS
Care Management Initial Consult    General Information  Assessment completed with: Family, Care Team Member, Angel ([place] Liaison), Yazan (sister)  Type of CM/SW Visit: Initial Assessment    Primary Care Provider verified and updated as needed: Yes   Readmission within the last 30 days: current reason for admission unrelated to previous admission   Return Category: New Diagnosis  Reason for Consult: discharge planning  Advance Care Planning: Advance Care Planning Reviewed: no concerns identified          Communication Assessment  Patient's communication style: spoken language (non-English)             Cognitive  Cognitive/Neuro/Behavioral: orientation, .WDL except, speech  Level of Consciousness: alert  Arousal Level: opens eyes spontaneously  Orientation: other (see comments) (LYNN)  Mood/Behavior: calm, cooperative  Best Language: 1 - Mild to moderate  Speech: word-finding difficulty, dysarthric, slow    Living Environment:   People in home: alone     Current living Arrangements: apartment      Able to return to prior arrangements: no  Living Arrangement Comments: Return to TCU at discharge    Family/Social Support:  Care provided by: self  Provides care for: no one, unable/limited ability to care for self  Marital Status: Single  Sibling(s), Facility resident(s)/Staff          Description of Support System: Supportive, Involved    Support Assessment: Adequate family and caregiver support, Adequate social supports    Current Resources:   Patient receiving home care services: No     Community Resources: Transitional Care  Equipment currently used at home: cane, straight  Supplies currently used at home: None    Employment/Financial:  Employment Status: disabled        Financial Concerns: No concerns identified   Referral to Financial Worker: No  Finance Comments: Nat SHELBY    Does the patient's insurance plan have a 3 day qualifying hospital stay waiver?  Yes   Will the waiver be used for post-acute placement?  Yes    Lifestyle & Psychosocial Needs:  Social Determinants of Health     Tobacco Use: High Risk (8/12/2023)    Patient History     Smoking Tobacco Use: Some Days     Smokeless Tobacco Use: Never     Passive Exposure: Not on file   Alcohol Use: Not on file   Financial Resource Strain: Not on file   Food Insecurity: Not on file   Transportation Needs: Not on file   Physical Activity: Not on file   Stress: Not on file   Social Connections: Not on file   Intimate Partner Violence: Not on file   Depression: At risk (6/16/2023)    PHQ-2     PHQ-2 Score: 6   Housing Stability: Not on file       Functional Status:  Prior to admission patient needed assistance:   Dependent ADLs:: Ambulation-walker, Dressing, Bathing, Positioning, Transfers  Dependent IADLs:: Cleaning, Cooking, Medication Management, Transportation, Meal Preparation, Shopping, Laundry  Assesssment of Functional Status: Not at baseline with ADL Functioning, Needs placement in a SNF/TCF for rehabilitation    Mental Health Status:  Mental Health Status: No Current Concerns       Chemical Dependency Status:  Chemical Dependency Status: No Current Concerns             Values/Beliefs:  Spiritual, Cultural Beliefs, Islam Practices, Values that affect care: no               Additional Information:  Per care management consult for discharge planning. Patient is a 52 year old male with a history of aortic dissection, penetrating ulcer of aorta, CVA and diabetes mellitus  type 2. Patient presented to [facility] on 8/13/23 from [facility] (direct admit) for abdominal pain. Per chart review, patient was at TCU prior to coming to the hospital. Writer called Angel the [place] liaision and confirmed that patient has been a resident of [facility] since 7/5/23 after his stroke. Angel confirmed patient is still in the TCU, but there are talks with family about transitioning to LTC. Angel confirmed that they can take patient back when medically ready and asking that  orders be faxed to [phone]. Writer called and talked with patient's sister, Yazan. Yazan confirmed that they want patient to return to [facility] at discharge and will need a ride for transport. Yazan confirmed that patient was independent/living along prior to his last hospitalization. Goal is for patient to return to TCU at this time. Yazan is agreeable.     BRIDGER Melo, SW   Social Work   [facility]

## 2023-08-14 NOTE — PROGRESS NOTES
MD Notification    Notified Person: MD    Notified Person Name:Mae Lyn    Notification Date/Time:8/13/23 1947    Notification Interaction:Vocera    Purpose of Notification:Family is requesting a SW consult to arrange a ride back to LTC facility upon discharge.     Orders Received:Pending    Comments:

## 2023-08-14 NOTE — PLAN OF CARE
Physical Therapy Discharge Summary    Reason for therapy discharge:    Discharged to transitional care facility.    Progress towards therapy goal(s). See goals on Care Plan in Psychiatric electronic health record for goal details.  Goals partially met.  Barriers to achieving goals:   discharge from facility.    Therapy recommendation(s):    Continued therapy is recommended.  Rationale/Recommendations: Pt is moving significanly below baseline mobility from chart review. Pt is currently requiring Max Ax1 in order to maintain sitting balance at EOB. Pt was at a TCU prior to hospital admission where he was recovering from a stroke. Recommend that pt return to TCU in order to continue inpatient rehabilitation prior to returning home.  PT Brief overview of current status: Mod Ax1-2 bed mobility, intermittent Mod-Max Ax1 sitting balance at EOB, Min A x2 for sera stedy transfers.      Recommendation above provided by last treating therapist.

## 2023-08-14 NOTE — PLAN OF CARE
Goal Outcome Evaluation:      Plan of Care Reviewed With: patient    Overall Patient Progress: improvingOverall Patient Progress: improving    Observation goals  PRIOR TO DISCHARGE        Comments:   -Diagnostic tests and consults completed and resulted-Not met, SW consult pending    -Vital signs normal or at patient baseline-Met    -Tolerating oral intake to maintain hydration-Met    -Adequate pain control on oral analgesics-Met    Nurse to notify provider when observation goals have been met and patient is ready for discharge.

## 2023-08-14 NOTE — DISCHARGE SUMMARY
[facility]    Discharge Summary  Hospitalist    Date of Admission:  8/13/2023  Date of Discharge:  8/14/2023  Discharging Provider: Patricia Wells MD    Discharge Diagnoses   Gastroenteritis    History of Present Illness   Review admission history and physical.    Hospital Course   Tiffany Her was admitted on 8/13/2023.  The following problems were addressed during his hospitalization:    Principal Problem:    Gastroenteritis    Patient was admitted overnight by my colleague Dr. Rivera with complaints of abdominal pain, nausea, vomiting and LLQ pulsatile mass. Likely has gastroenteritis and started on supportive cares. Vascular surgery saw the patient for the pulsatile mass, noted infrarenal AAA that appears stable. No intervention needed at this time. Will need follow-up CTA in 6 months to reevaluate size of aneurysm at that time. Continue aspirin, Plavix and high-dose statin. Continue treating gastroenteritis. His symptoms were improved, he denied any nausea or vomiting on the day of discharge and the patient was discharged since he did not have any other symptoms.  Imaging here indicated as below,  Moderate compression fracture involving the L1 vertebral body. The degree of compression has increased since the 01/27/2023 exam. Clinical correlation.   Patricia Wells MD    Significant Results and Procedures       Pending Results     Unresulted Labs Ordered in the Past 30 Days of this Admission       No orders found for last 31 day(s).            Code Status   Full Code       Primary Care Physician   Carolyne Anderson    Physical Exam   Temp: 98.6  F (37  C) Temp src: Oral BP: (!) 152/92 Pulse: 86   Resp: 16 SpO2: 98 % O2 Device: None (Room air)    There were no vitals filed for this visit.  Vital Signs with Ranges  Temp:  [97.3  F (36.3  C)-98.6  F (37  C)] 98.6  F (37  C)  Pulse:  [65-86] 86  Resp:  [16-18] 16  BP: (129-175)/(72-98) 152/92  SpO2:  [96 %-98 %] 98 %  I/O last 3 completed  shifts:  In: 1377 [P.O.:450; I.V.:927]  Out: 700 [Urine:700]    The patient was examined on the day of discharge.    Discharge Disposition   Discharged to short-term care facility  Condition at discharge: Stable    Consultations This Hospital Stay   PHYSICAL THERAPY ADULT IP CONSULT  VASCULAR SURGERY IP CONSULT  CARE MANAGEMENT / SOCIAL WORK IP CONSULT  PHYSICAL THERAPY ADULT IP CONSULT  OCCUPATIONAL THERAPY ADULT IP CONSULT    Time Spent on this Encounter   I, Patricia Wells MD, personally saw the patient today and spent greater than 30 minutes discharging this patient.    Discharge Orders      General info for SNF    Length of Stay Estimate: Short Term Care: Estimated # of Days <30  Condition at Discharge: Improving  Level of care:skilled   Rehabilitation Potential: Excellent  Admission H&P remains valid and up-to-date: Yes  Recent Chemotherapy: N/A  Use Nursing Home Standing Orders: Yes     Mantoux instructions    Give two-step Mantoux (PPD) Per Facility Policy Yes     Follow Up and recommended labs and tests    Follow up with snf physician.  The following labs/tests are recommended: magnesium levels in 2 days .     Reason for your hospital stay    gastroenteritis     Glucose monitor nursing POCT    Before meals and at bedtime     Activity - Up with nursing assistance     Physical Therapy Adult Consult    Evaluate and treat as clinically indicated.    Reason:  History of  cerebrovascular accident , deconditioning     Occupational Therapy Adult Consult    Evaluate and treat as clinically indicated.    Reason:  History of  cerebrovascular accident , deconditioning     Fall precautions     Diet    Follow this diet upon discharge: Orders Placed This Encounter      Room Service      Regular Diet Adult     Discharge Medications   Current Discharge Medication List        START taking these medications    Details   acetaminophen (TYLENOL) 325 MG tablet Take 2 tablets (650 mg) by mouth every 6 hours as needed  for mild pain or other (and adjunct with moderate or severe pain or per patient request)    Associated Diagnoses: Cerebrovascular accident (CVA), unspecified mechanism (H)      senna-docusate (SENOKOT-S/PERICOLACE) 8.6-50 MG tablet Take 1 tablet by mouth 2 times daily as needed for constipation    Associated Diagnoses: Cerebrovascular accident (CVA), unspecified mechanism (H)           CONTINUE these medications which have NOT CHANGED    Details   amLODIPine (NORVASC) 5 MG tablet Take 5 mg by mouth daily      atorvastatin (LIPITOR) 80 MG tablet Take 80 mg by mouth every evening      FLUoxetine 20 MG tablet Take 60 mg by mouth daily      metFORMIN (GLUCOPHAGE XR) 500 MG 24 hr tablet Take 1,000 mg by mouth daily (with dinner)      nicotine (NICODERM CQ) 14 MG/24HR 24 hr patch Place 1 patch onto the skin every 24 hours      aspirin (ASA) 325 MG tablet 1 tablet (325 mg) by Oral or Feeding Tube route daily  Qty: 30 tablet, Refills: 2    Associated Diagnoses: Cerebrovascular accident (CVA), unspecified mechanism (H)      blood glucose (NO BRAND SPECIFIED) test strip as directed AccuChek Guide Test Strips daily for 30 days      clopidogrel (PLAVIX) 75 MG tablet 1 tablet (75 mg) by Oral or Feeding Tube route daily  Qty: 30 tablet, Refills: 2    Associated Diagnoses: Cerebrovascular accident (CVA), unspecified mechanism (H)           STOP taking these medications       aspirin (ASA) 81 MG chewable tablet Comments:   Reason for Stopping:             Allergies   No Known Allergies  Data   Most Recent 3 CBC's:  Recent Labs   Lab Test 08/12/23  2307 06/05/23  0925 06/04/23  0815   WBC 9.7 9.2 9.2   HGB 14.1 13.5 13.5   MCV 93 95 93    230 210      Most Recent 3 BMP's:  Recent Labs   Lab Test 08/14/23  0803 08/14/23  0158 08/13/23  2151 08/13/23  0459 08/12/23  2307 06/05/23  1134 06/05/23  0925 06/04/23  1210 06/04/23  0814   NA  --   --   --   --  138  --  136  --  139   POTASSIUM  --   --   --   --  4.2  --  3.9  --   3.9   CHLORIDE  --   --   --   --  103  --  104  --  107   CO2  --   --   --   --  21*  --  19*  --  19*   BUN  --   --   --   --  19.3  --  17.3  --  11.9   CR  --   --   --   --  0.86  --  1.23*  --  0.93   ANIONGAP  --   --   --   --  14  --  13  --  13   LYNNE  --   --   --   --  9.6  --  9.1  --  9.1   * 93 143*   < > 149*   < > 169*   < > 186*    < > = values in this interval not displayed.     Most Recent 2 LFT's:  Recent Labs   Lab Test 08/12/23  2307 08/25/22  1036   AST 27 15   ALT 25 13   ALKPHOS 141* 143*   BILITOTAL 0.4 0.6     Most Recent INR's and Anticoagulation Dosing History:  Anticoagulation Dose History          Latest Ref Rng & Units 1/12/2018 1/25/2018 2/3/2018 2/13/2018   Recent Dosing and Labs   INR 0.85 - 1.15 0.98  1.03  1.02  1.01  1.04          1/27/2023 8/12/2023   Recent Dosing and Labs   INR 0.97  1.08      Most Recent 3 Troponin's:No lab results found.  Most Recent Cholesterol Panel:  Recent Labs   Lab Test 06/03/23  2104   CHOL 185   *   HDL 29*   TRIG 219*     Most Recent 6 Bacteria Isolates From Any Culture (See EPIC Reports for Culture Details):No lab results found.  Most Recent TSH, T4 and A1c Labs:  Recent Labs   Lab Test 06/04/23  0815 06/03/23  1330   TSH  --  1.83   A1C 9.6*  --      Results for orders placed or performed during the hospital encounter of 08/12/23   CTA Chest Abdomen Pelvis w Contrast    Narrative    EXAM: CTA CHEST ABDOMEN PELVIS W CONTRAST  LOCATION: [facility]  DATE: 8/13/2023    INDICATION: Abdominal pian and pulsatile mass.  COMPARISON: 01/27/2023.  TECHNIQUE: CT angiogram chest abdomen pelvis during arterial phase of injection of IV contrast. 2D and 3D MIP reconstructions were performed by the CT technologist. Dose reduction techniques were used.   CONTRAST: 90 mL Iso-370.    FINDINGS:   CT ANGIOGRAM CHEST, ABDOMEN, AND PELVIS: No evidence for acute aortic dissection or other acute aortic abnormality. Prominent  diffuse atheromatous disease is present. Ascending thoracic aorta is normal in caliber at 3.5 cm. Great vessels arising off the   aortic arch are patent without stenosis. Patent stent graft involving the descending thoracic aorta is stable in appearance. Mild aneurysmal dilatation of the distal descending thoracic aorta at the hiatus measuring 3.4 cm, unchanged. Celiac, superior   mesenteric, and bilateral renal arteries are patent without stenosis. AYLIN is patent. Aneurysmal dilatation of the distal abdominal aorta measuring up to 5.4 cm. This is unchanged. No evidence for aneurysm rupture or leak. Prominent amount of mural   thrombus associated with the distal abdominal aortic aneurysm. Bilateral iliac arteries are patent with moderate atheromatous narrowing in the left proximal common iliac artery, both the external and internal iliac arteries on the left at the   bifurcation, and in the right internal iliac artery proximally.    LUNGS AND PLEURA: No acute-appearing infiltrates or consolidation. Tiny 2 mm nodule in the right lung anteriorly on series 7 image 162 is unchanged. No pleural effusions.    MEDIASTINUM/AXILLAE: Normal.    CORONARY ARTERY CALCIFICATION: Severe.    HEPATOBILIARY: Normal.    PANCREAS: Normal.    SPLEEN: Normal.    ADRENAL GLANDS: Normal.    KIDNEYS/BLADDER: Few small benign renal cysts with no specific follow-up needed. Kidneys otherwise negative. No hydronephrosis. Bladder is unremarkable.    BOWEL: Bowel is normal in caliber with no evidence for obstruction. Normal appendix. Much of the bowel is fluid-filled which can be seen with a nonspecific diarrheal state or gastroenteritis. Clinical correlation. No obvious bowel wall thickening or   acute inflammatory bowel changes.    LYMPH NODES: Normal.    PELVIC ORGANS: Normal.    MUSCULOSKELETAL: Moderate compression fracture involving the L1 vertebral body. The degree of compression has increased since the 01/27/2023 exam. Clinical  correlation.      Impression    IMPRESSION:  1.  Overall no significant change in the appearance of the aorta compared with 01/27/2023. Negative for acute dissection. Negative for aneurysm rupture, leak or other aortic findings.    2.  Stable appearance to a patent descending thoracic aorta stent graft.    3.  Mild aneurysmal dilatation of the distal descending thoracic aorta at 3.4 cm, unchanged. Aneurysmal dilatation of the distal abdominal aorta measuring up to 5.4 cm also unchanged.    4.  No acute findings in the chest.    5.  Severe coronary artery calcification.    6.  Much of the bowel is fluid-filled although normal in caliber. This appearance is nonspecific but can be seen with a diarrheal state or nonspecific gastroenteritis. Clinical correlation. No obvious bowel wall thickening or acute inflammatory bowel   changes.    7.  Moderate compression fracture involving the L1 vertebral body. The degree of compression has increased since 01/27/2023. Clinical correlation.

## 2023-08-14 NOTE — PROGRESS NOTES
Pt discharged VSS education provided all questions answered. Pt discharged with all belongings.

## 2023-08-14 NOTE — PLAN OF CARE
Goal Outcome Evaluation:    Patient approved for discharge per Patricia Wells MD orders.  Wheelchair ride set for [phone] today back to [facility]. Reviewed with AVS and med list with the patient.  A packet of information will be handed to the .    All patient belongings are packed up and ready to be transported with the patient to [facility].

## 2023-08-14 NOTE — PLAN OF CARE
Goal Outcome Evaluation:      Plan of Care Reviewed With: patient    Overall Patient Progress: improvingOverall Patient Progress: improving    Observation goals  PRIOR TO DISCHARGE        Comments:   -Diagnostic tests and consults completed and resulted-Not met, SW consult pending    -Vital signs normal or at patient baseline-Met    -Tolerating oral intake to maintain hydration-Met    -Adequate pain control on oral analgesics-Met    Nurse to notify provider when observation goals have been met and patient is ready for discharge.        Summary: Pt here from a TCU with abd pain, gastroenteritis and aortic aneurysm     Care Plan Summary Note:  Orientation: A&O to self  Observation Goals (met & not met): Not met  Activity Level: AX2, lift, turn and repo  Fall Risk: Y  Behavior & Aggression Tool Color: Green  Pain Management: No sign of pain noted  ABNL VS/O2: VSS on RA  ABNL Lab/BG: None this shift, 2 failed attempt with lab draws this morning , pt now refusing, AM nurse to call lab when family is here  Diet: Regular diet, carb counting, takes pills one at a time with apple sauce  Bowel/Bladder: Incontinent, voiding ok, good UOP, no BM this shift  Drains/Devices: PIV with N/S cont@100  Tests/Procedures for next shift: SW consult  Anticipated DC date: 8/14, back to TCU where he came from  Other Important Info: Pt is Hmong speaking, vascular rec no intervention now, CTA ion 6 months

## 2023-08-14 NOTE — PROGRESS NOTES
Observation goals  PRIOR TO DISCHARGE        Comments:   -Diagnostic tests and consults completed and resulted-Met    -Vital signs normal or at patient baseline-Met    -Tolerating oral intake to maintain hydration-Met    -Adequate pain control on oral analgesics-Met    Nurse to notify provider when observation goals have been met and patient is ready for discharge.    Patient to discharge today to [facility] with transportation set up, refer to  note.

## 2023-08-15 ENCOUNTER — PATIENT OUTREACH (OUTPATIENT)
Dept: CARE COORDINATION | Facility: CLINIC | Age: 53
End: 2023-08-15
Payer: COMMERCIAL

## 2023-08-15 ENCOUNTER — HOSPITAL ENCOUNTER (EMERGENCY)
Facility: HOSPITAL | Age: 53
Discharge: NURSING, REHAB OR LONG-TERM CARE | End: 2023-08-15
Attending: STUDENT IN AN ORGANIZED HEALTH CARE EDUCATION/TRAINING PROGRAM | Admitting: STUDENT IN AN ORGANIZED HEALTH CARE EDUCATION/TRAINING PROGRAM
Payer: COMMERCIAL

## 2023-08-15 ENCOUNTER — APPOINTMENT (OUTPATIENT)
Dept: CT IMAGING | Facility: HOSPITAL | Age: 53
End: 2023-08-15
Attending: STUDENT IN AN ORGANIZED HEALTH CARE EDUCATION/TRAINING PROGRAM
Payer: COMMERCIAL

## 2023-08-15 VITALS
HEART RATE: 88 BPM | RESPIRATION RATE: 16 BRPM | BODY MASS INDEX: 27.48 KG/M2 | OXYGEN SATURATION: 99 % | TEMPERATURE: 97.8 F | WEIGHT: 140 LBS | DIASTOLIC BLOOD PRESSURE: 94 MMHG | SYSTOLIC BLOOD PRESSURE: 182 MMHG | HEIGHT: 60 IN

## 2023-08-15 DIAGNOSIS — R10.12 LEFT UPPER QUADRANT ABDOMINAL PAIN: ICD-10-CM

## 2023-08-15 LAB
ALBUMIN SERPL BCG-MCNC: 4.3 G/DL (ref 3.5–5.2)
ALBUMIN UR-MCNC: NEGATIVE MG/DL
ALP SERPL-CCNC: 148 U/L (ref 40–129)
ALT SERPL W P-5'-P-CCNC: 29 U/L (ref 0–70)
ANION GAP SERPL CALCULATED.3IONS-SCNC: 15 MMOL/L (ref 7–15)
APPEARANCE UR: CLEAR
AST SERPL W P-5'-P-CCNC: 32 U/L (ref 0–45)
ATRIAL RATE - MUSE: 82 BPM
BASOPHILS # BLD AUTO: 0 10E3/UL (ref 0–0.2)
BASOPHILS NFR BLD AUTO: 1 %
BILIRUB DIRECT SERPL-MCNC: <0.2 MG/DL (ref 0–0.3)
BILIRUB SERPL-MCNC: 0.4 MG/DL
BILIRUB UR QL STRIP: NEGATIVE
BUN SERPL-MCNC: 6.7 MG/DL (ref 6–20)
CALCIUM SERPL-MCNC: 9.9 MG/DL (ref 8.6–10)
CHLORIDE SERPL-SCNC: 102 MMOL/L (ref 98–107)
COLOR UR AUTO: ABNORMAL
CREAT SERPL-MCNC: 0.76 MG/DL (ref 0.67–1.17)
DEPRECATED HCO3 PLAS-SCNC: 23 MMOL/L (ref 22–29)
DIASTOLIC BLOOD PRESSURE - MUSE: 85 MMHG
EOSINOPHIL # BLD AUTO: 0.1 10E3/UL (ref 0–0.7)
EOSINOPHIL NFR BLD AUTO: 1 %
ERYTHROCYTE [DISTWIDTH] IN BLOOD BY AUTOMATED COUNT: 12.6 % (ref 10–15)
GFR SERPL CREATININE-BSD FRML MDRD: >90 ML/MIN/1.73M2
GLUCOSE SERPL-MCNC: 139 MG/DL (ref 70–99)
GLUCOSE UR STRIP-MCNC: NEGATIVE MG/DL
HCT VFR BLD AUTO: 42.3 % (ref 40–53)
HGB BLD-MCNC: 14.1 G/DL (ref 13.3–17.7)
HGB UR QL STRIP: NEGATIVE
HOLD SPECIMEN: NORMAL
IMM GRANULOCYTES # BLD: 0 10E3/UL
IMM GRANULOCYTES NFR BLD: 1 %
INTERPRETATION ECG - MUSE: NORMAL
KETONES UR STRIP-MCNC: ABNORMAL MG/DL
LEUKOCYTE ESTERASE UR QL STRIP: NEGATIVE
LIPASE SERPL-CCNC: 32 U/L (ref 13–60)
LYMPHOCYTES # BLD AUTO: 0.9 10E3/UL (ref 0.8–5.3)
LYMPHOCYTES NFR BLD AUTO: 11 %
MCH RBC QN AUTO: 30.9 PG (ref 26.5–33)
MCHC RBC AUTO-ENTMCNC: 33.3 G/DL (ref 31.5–36.5)
MCV RBC AUTO: 93 FL (ref 78–100)
MONOCYTES # BLD AUTO: 0.5 10E3/UL (ref 0–1.3)
MONOCYTES NFR BLD AUTO: 5 %
MUCOUS THREADS #/AREA URNS LPF: PRESENT /LPF
NEUTROPHILS # BLD AUTO: 7 10E3/UL (ref 1.6–8.3)
NEUTROPHILS NFR BLD AUTO: 81 %
NITRATE UR QL: NEGATIVE
NRBC # BLD AUTO: 0 10E3/UL
NRBC BLD AUTO-RTO: 0 /100
P AXIS - MUSE: 29 DEGREES
PH UR STRIP: 6 [PH] (ref 5–7)
PLATELET # BLD AUTO: 206 10E3/UL (ref 150–450)
POTASSIUM SERPL-SCNC: 3.5 MMOL/L (ref 3.4–5.3)
PR INTERVAL - MUSE: 182 MS
PROT SERPL-MCNC: 7.9 G/DL (ref 6.4–8.3)
QRS DURATION - MUSE: 80 MS
QT - MUSE: 428 MS
QTC - MUSE: 500 MS
R AXIS - MUSE: 9 DEGREES
RBC # BLD AUTO: 4.56 10E6/UL (ref 4.4–5.9)
RBC URINE: 2 /HPF
SODIUM SERPL-SCNC: 140 MMOL/L (ref 136–145)
SP GR UR STRIP: <1.005 (ref 1–1.03)
SYSTOLIC BLOOD PRESSURE - MUSE: 143 MMHG
T AXIS - MUSE: 39 DEGREES
UROBILINOGEN UR STRIP-MCNC: <2 MG/DL
VENTRICULAR RATE- MUSE: 82 BPM
WBC # BLD AUTO: 8.5 10E3/UL (ref 4–11)
WBC URINE: 1 /HPF

## 2023-08-15 PROCEDURE — 80053 COMPREHEN METABOLIC PANEL: CPT | Performed by: FAMILY MEDICINE

## 2023-08-15 PROCEDURE — 81001 URINALYSIS AUTO W/SCOPE: CPT | Performed by: FAMILY MEDICINE

## 2023-08-15 PROCEDURE — 82310 ASSAY OF CALCIUM: CPT | Performed by: FAMILY MEDICINE

## 2023-08-15 PROCEDURE — 250N000011 HC RX IP 250 OP 636: Mod: JZ | Performed by: STUDENT IN AN ORGANIZED HEALTH CARE EDUCATION/TRAINING PROGRAM

## 2023-08-15 PROCEDURE — 99285 EMERGENCY DEPT VISIT HI MDM: CPT | Mod: 25

## 2023-08-15 PROCEDURE — C9113 INJ PANTOPRAZOLE SODIUM, VIA: HCPCS | Mod: JZ | Performed by: STUDENT IN AN ORGANIZED HEALTH CARE EDUCATION/TRAINING PROGRAM

## 2023-08-15 PROCEDURE — 83690 ASSAY OF LIPASE: CPT | Performed by: FAMILY MEDICINE

## 2023-08-15 PROCEDURE — 85004 AUTOMATED DIFF WBC COUNT: CPT | Performed by: FAMILY MEDICINE

## 2023-08-15 PROCEDURE — 82248 BILIRUBIN DIRECT: CPT | Performed by: FAMILY MEDICINE

## 2023-08-15 PROCEDURE — 250N000011 HC RX IP 250 OP 636: Performed by: STUDENT IN AN ORGANIZED HEALTH CARE EDUCATION/TRAINING PROGRAM

## 2023-08-15 PROCEDURE — 96374 THER/PROPH/DIAG INJ IV PUSH: CPT | Mod: 59

## 2023-08-15 PROCEDURE — 74174 CTA ABD&PLVS W/CONTRAST: CPT

## 2023-08-15 PROCEDURE — 96375 TX/PRO/DX INJ NEW DRUG ADDON: CPT | Mod: 59

## 2023-08-15 PROCEDURE — 36415 COLL VENOUS BLD VENIPUNCTURE: CPT | Performed by: FAMILY MEDICINE

## 2023-08-15 RX ORDER — MORPHINE SULFATE 4 MG/ML
4 INJECTION, SOLUTION INTRAMUSCULAR; INTRAVENOUS ONCE
Status: COMPLETED | OUTPATIENT
Start: 2023-08-15 | End: 2023-08-15

## 2023-08-15 RX ORDER — PANTOPRAZOLE SODIUM 40 MG/1
40 TABLET, DELAYED RELEASE ORAL DAILY
Qty: 30 TABLET | Refills: 0 | Status: SHIPPED | OUTPATIENT
Start: 2023-08-15 | End: 2023-09-14

## 2023-08-15 RX ORDER — IOPAMIDOL 755 MG/ML
90 INJECTION, SOLUTION INTRAVASCULAR ONCE
Status: COMPLETED | OUTPATIENT
Start: 2023-08-15 | End: 2023-08-15

## 2023-08-15 RX ADMIN — MORPHINE SULFATE 4 MG: 4 INJECTION, SOLUTION INTRAMUSCULAR; INTRAVENOUS at 18:37

## 2023-08-15 RX ADMIN — IOPAMIDOL 90 ML: 755 INJECTION, SOLUTION INTRAVENOUS at 16:25

## 2023-08-15 RX ADMIN — PANTOPRAZOLE SODIUM 40 MG: 40 INJECTION, POWDER, FOR SOLUTION INTRAVENOUS at 18:39

## 2023-08-15 ASSESSMENT — ACTIVITIES OF DAILY LIVING (ADL)
ADLS_ACUITY_SCORE: 35

## 2023-08-15 NOTE — TELEPHONE ENCOUNTER
Spoke with TCU Nurse manager and discussed that pt should be scheduled for neurology follow up with [facility] rather than [facility] given recent history. He was in agreement and will discuss further with their providers. He did not have any additional questions or concerns.      Zoë Yanez BS, RN, SCRN  RN Stroke Neurology Care Coordinator  [facility] Neuroscience Service Line

## 2023-08-15 NOTE — DISCHARGE INSTRUCTIONS
Please follow-up with your primary care doctor.  Take medication as prescribed.  Return for any worsening pain

## 2023-08-15 NOTE — ED TRIAGE NOTES
Patient to triage via SP[facility], Hmong speaking, using language line to communicate. Patient coming from a TCU  ([address]) had surgery yesterday to removed a nodule from bowel (?), here for LLQ abdominal pain. No nausea, no stool since procedure. Blood sugar by medics at 194

## 2023-08-15 NOTE — PROGRESS NOTES
[facility] Care Resource [place]    Background: Transitional Care Management program identified per system criteria and reviewed by [facility] team for possible outreach.    Assessment: Upon chart review, CCRC Team member will not proceed with patient outreach related to this episode of Transitional Care Management program due to reason below:    Non-[facility] TCU: CCRC team member noted patient discharged to TCU/ARU/LTACH. Patient is not established with a [facility] currently supported by Primary Care-Care Coordination therefore handoff to Primary Care-Care Coordination is not appropriate at this time.    Plan: Transitional Care Management episode addressed appropriately per reason noted above.      ROSCOE Chin  [facility] Care Resource [place], [facility]    *Connected Care Resource Team does NOT follow patient ongoing. Referrals are identified based on internal discharge reports and the outreach is to ensure patient has an understanding of their discharge instructions.

## 2023-08-15 NOTE — ED PROVIDER NOTES
"  Emergency Department Encounter         FINAL IMPRESSION:  ABD PAIN        ED COURSE AND MEDICAL DECISION MAKING       ED Course as of 08/15/23 1439   Tue Aug 15, 2023   1419 Patient is a 52-year-old male with a history of stroke, hypertension, hyperlipidemia, here with left-sided abdominal pain.  Patient was discharged 2 days ago from self to hospital after he was admitted for gastroenteritis and found to have a 5.6 cm AAA.  Seen by vascular surgery who is recommending continued follow-up in 6 months.  Patient states his pain did mildly improve however started again 2 days ago.  Also endorsing hematuria with dysuria.  Also stated that that happened/started in the hospital.\"  Able to tell me if he told doctors there regarding that complaint.  No chest pain or trouble breathing.  No black or bloody stools.  No nausea or vomiting.  No headaches.  No chest pain.  On arrival here he looks well.  Mildly hypertensive.  Heart and lungs normal.  Mild left-sided abdominal discomfort.  No peritonitis.  Heart and lungs normal.  Neurologically grossly intact and does have left-sided weakness chronically from recent stroke.  Plan for repeat CTA basic blood work reevaluate.     - discussed case with vascular surgery, ok to discharge home with return precautions for any worsening pain.  - Patient feeling much better.  Now localizing the mild discomfort in his left upper quadrant.  I suspect gastritis.  He has no black or blood in his stool.  No hematemesis.  No lower abdominal discomfort to palpation.  No mid abdominal discomfort to palpation.  No back pain.  No nausea.  No diaphoresis.  Vitals otherwise stable.  Urinalysis without signs of infection.        2:06 PM I performed my initial interview and exam.  5:30 PM I spoke with Dr. Brown, Vascular Surgery.    Medical Decision Making    History:  Supplemental history from: Documented in chart, if applicable  External Record(s) reviewed: Inpatient Record: Hospitalization Notes " from 8/13/23, Hospitalization Notes from 6/28/23, and Hospitalization Notes from 6/3/23 and Outpatient Record: ED Visit from 8/12/23     Work Up:  Chart documentation includes differential considered and any EKGs or imaging independently interpreted by provider, where specified.  In additional to work up documented, I considered the following work up: Documented in chart, if applicable.    External consultation:  Discussion of management with another provider: Documented in chart, if applicable    Complicating factors:  Care impacted by chronic illness: Cerebrovascular Disease, Diabetes, Hyperlipidemia, and Hypertension  Care affected by social determinants of health: N/A    Disposition considerations: Discharge. No recommendations on prescription strength medication(s). See documentation for any additional details.                  Critical Care     Performed by: Karl Fulton or    Authorized by: Karl Fulton  Total critical care time:  minutes  Critical care was necessary to treat or prevent imminent or life-threatening deterioration of the following conditions:   Critical care was time spent personally by me on the following activities: development of treatment plan with patient or surrogate, discussions with consultants, examination of patient, evaluation of patient's response to treatment, obtaining history from patient or surrogate, ordering and performing treatments and interventions, ordering and review of laboratory studies, ordering and review of radiographic studies, re-evaluation of patient's condition and monitoring for potential decompensation.  Critical care time was exclusive of separately billable procedures and treating other patients.'    At the conclusion of the encounter I discussed the results of all the tests and the disposition. The questions were answered. The patient or family acknowledged understanding and was agreeable with the care plan.                  MEDICATIONS GIVEN IN THE EMERGENCY  DEPARTMENT:  Medications - No data to display    NEW PRESCRIPTIONS STARTED AT TODAY'S ED VISIT:  New Prescriptions    No medications on file       HPI     History limited due to the patient being a poor historian.     Patient information obtained from: Patient    Use of Interpretor: Yes (Phone) - Language - Elizabeth Lenz is a 52 year old male with a pertinent history of gastroenteritis, DM Type II, aortic dissection, penetrating ulcer of aorta, mixed hyperlipidemia, essential hypertension, tobacco use disorder, left hemiparesis, acute encephalopathy, and CVA who presents to this ED by ambulance for evaluation of abdominal pain.    Per Chart Review,  8/13/23  - 8/14/23 The patient was admitted to [facility] for further evaluation of abdominal pain. Patient initially presented to [facility] ED with complaints of abdominal pain and a pulsatile mass. Lactic 2.5 -> 1.6. CT a/p with evidence of gastroenteritis and bowel thickening or inflammation. CT noted no significant change in appearance of aorta compared with 1/2023 imaging. No dissection, leak, rupture, or other findings.    Patient complains of a left-sided abdominal pain. He reports that his pain is the same as the pain he had with his recent hospitalization and notes that his pain had gotten better before discharge and then worsened yesterday. Patient also endorses continued dysuria and hematuria that began prior to his hospitalization. Patient also endorses a subjective fever and says that he feels warm. Patient denies chest pain, shortness of breath, black or bloody stools, vomiting, diarrhea, or any other concerns at this time.     REVIEW OF SYSTEMS:  Review of Systems   Constitutional: Positive for subjective fever. Negative for malaise  HEENT: Negative runny nose, sore throat, ear pain, neck pain  Respiratory: Negative for shortness of breath, cough, congestion  Cardiovascular: Negative for chest pain, leg  "edema  Gastrointestinal: Positive for abdominal pain. Negative for abdominal distention, constipation, vomiting, nausea, diarrhea  Genitourinary: Positive for dysuria and hematuria.   Integument: Negative for rash, skin breakdown  Neurological: Negative for paresthesias, weakness, headache.  Musculoskeletal: Negative for joint pain, joint swelling      All other systems reviewed and are negative.          MEDICAL HISTORY     Past Medical History:   Diagnosis Date    Aortic dissection (H)     Depression     Diabetes mellitus type 2 in nonobese (H)     HLD (hyperlipidemia)     HTN (hypertension)     Pneumonia     Respiratory failure (H)        Past Surgical History:   Procedure Laterality Date    ENDOVASCULAR REPAIR ANEURYSM ABDOMINAL AORTA N/A 2/13/2018    Procedure: ENDOVASCULAR REPAIR ANEURYSM ABDOMINAL AORTA;  thoracic endovascular repair of aorta distal to left subclavian artery.  Intravascular ultrasouns of aortic arch, thoracic aorta, and abdominal aorta.  ultrasound guided vascular accessx2, bilateral lower extremity arterial duplex limited study, percutaneous closure of endovascular access x1.  aorta catheterization x1.;  Surgeon: Renata Monique       Social History     Tobacco Use    Smoking status: Some Days    Smokeless tobacco: Never   Vaping Use    Vaping Use: Never used   Substance Use Topics    Alcohol use: No    Drug use: No       acetaminophen (TYLENOL) 325 MG tablet  amLODIPine (NORVASC) 5 MG tablet  aspirin (ASA) 325 MG tablet  atorvastatin (LIPITOR) 80 MG tablet  blood glucose (NO BRAND SPECIFIED) test strip  clopidogrel (PLAVIX) 75 MG tablet  FLUoxetine 20 MG tablet  magnesium 250 MG tablet  metFORMIN (GLUCOPHAGE XR) 500 MG 24 hr tablet  nicotine (NICODERM CQ) 14 MG/24HR 24 hr patch  senna-docusate (SENOKOT-S/PERICOLACE) 8.6-50 MG tablet            PHYSICAL EXAM     BP (!) 145/81   Pulse 79   Resp 20   Ht 1.448 m (4' 9\")   Wt 63.5 kg (140 lb)   SpO2 100%   BMI 30.30 kg/m        PHYSICAL EXAM: "     General: Patient appears well, nontoxic, comfortable. Mildly hypertensive.   HEENT: Moist mucous membranes,  No head trauma.    Cardiovascular: Normal rate, normal rhythm, no extremity edema.  No appreciable murmur.  Respiratory: No signs of respiratory distress, lungs are clear to auscultation bilaterally with no wheezes rhonchi or rales.  Abdominal: Soft, mild left-sided abdominal discomfort, no peritonitis, nondistended, no palpable masses, no guarding, no rebound  Musculoskeletal: Full range of motion of joints, no deformities appreciated.  Neurological: Alert and oriented, grossly neurologically intact. Left-sided weakness chronically from recent stroke.  Psychological: Normal affect and mood.  Integument: No rashes appreciated          RESULTS       Labs Ordered and Resulted from Time of ED Arrival to Time of ED Departure   BASIC METABOLIC PANEL - Abnormal       Result Value    Sodium 140      Potassium 3.5      Chloride 102      Carbon Dioxide (CO2) 23      Anion Gap 15      Urea Nitrogen 6.7      Creatinine 0.76      Calcium 9.9      Glucose 139 (*)     GFR Estimate >90     HEPATIC FUNCTION PANEL - Abnormal    Protein Total 7.9      Albumin 4.3      Bilirubin Total 0.4      Alkaline Phosphatase 148 (*)     AST 32      ALT 29      Bilirubin Direct <0.20     ROUTINE UA WITH MICROSCOPIC REFLEX TO CULTURE - Abnormal    Color Urine Light Yellow      Appearance Urine Clear      Glucose Urine Negative      Bilirubin Urine Negative      Ketones Urine Trace (*)     Specific Gravity Urine <1.005      Blood Urine Negative      pH Urine 6.0      Protein Albumin Urine Negative      Urobilinogen Urine <2.0      Nitrite Urine Negative      Leukocyte Esterase Urine Negative      Mucus Urine Present (*)     RBC Urine 2      WBC Urine 1     LIPASE - Normal    Lipase 32     CBC WITH PLATELETS AND DIFFERENTIAL    WBC Count 8.5      RBC Count 4.56      Hemoglobin 14.1      Hematocrit 42.3      MCV 93      MCH 30.9      MCHC  33.3      RDW 12.6      Platelet Count 206      % Neutrophils 81      % Lymphocytes 11      % Monocytes 5      % Eosinophils 1      % Basophils 1      % Immature Granulocytes 1      NRBCs per 100 WBC 0      Absolute Neutrophils 7.0      Absolute Lymphocytes 0.9      Absolute Monocytes 0.5      Absolute Eosinophils 0.1      Absolute Basophils 0.0      Absolute Immature Granulocytes 0.0      Absolute NRBCs 0.0         CTA Abdomen Pelvis with Contrast   Final Result   IMPRESSION:   1.  Partial visualization of descending thoracic aortic endograft.   2.  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 No evidence of rupture.   3.  Stable infrarenal abdominal aortic aneurysm measuring 5.2 x 4.5 cm. No evidence of rupture.   4.  Moderate compression fracture L1 vertebral body. No significant change comparison to previous study.                        PROCEDURES:  Procedures:  Procedures       I, Reji Gonzalez am serving as a scribe to document services personally performed by Karl Fulton DO, based on my observations and the provider's statements to me.  I, Karl Fulton DO, attest that Reji Gonzalez is acting in a scribe capacity, has observed my performance of the services and has documented them in accordance with my direction.    Karl Fulton DO  Emergency Medicine  [facility] EMERGENCY DEPARTMENT       Karl Fulton DO  08/15/23 0269

## 2023-08-16 NOTE — ED NOTES
Assumed care of patient and received report from NEEL Hilario of [facility] called and report given to staff. Transport called and ETA of 2000

## 2023-09-06 DIAGNOSIS — I10 ESSENTIAL HYPERTENSION: Primary | ICD-10-CM

## 2023-09-07 RX ORDER — LISINOPRIL 20 MG/1
20 TABLET ORAL DAILY
Qty: 90 TABLET | Refills: 0 | Status: SHIPPED | OUTPATIENT
Start: 2023-09-07

## 2023-10-17 ENCOUNTER — APPOINTMENT (OUTPATIENT)
Dept: INTERPRETER SERVICES | Facility: CLINIC | Age: 53
End: 2023-10-17
Payer: COMMERCIAL

## 2023-10-18 ENCOUNTER — APPOINTMENT (OUTPATIENT)
Dept: INTERPRETER SERVICES | Facility: CLINIC | Age: 53
End: 2023-10-18
Payer: COMMERCIAL

## 2023-10-18 RX ORDER — LIDOCAINE 40 MG/G
CREAM TOPICAL
Status: CANCELLED | OUTPATIENT
Start: 2023-10-18

## 2023-10-18 RX ORDER — SODIUM CHLORIDE, SODIUM LACTATE, POTASSIUM CHLORIDE, CALCIUM CHLORIDE 600; 310; 30; 20 MG/100ML; MG/100ML; MG/100ML; MG/100ML
INJECTION, SOLUTION INTRAVENOUS CONTINUOUS
Status: CANCELLED | OUTPATIENT
Start: 2023-10-18

## 2023-10-30 ENCOUNTER — OFFICE VISIT (OUTPATIENT)
Dept: CARDIOLOGY | Facility: CLINIC | Age: 53
End: 2023-10-30
Payer: COMMERCIAL

## 2023-10-30 VITALS
HEART RATE: 92 BPM | HEIGHT: 60 IN | OXYGEN SATURATION: 99 % | DIASTOLIC BLOOD PRESSURE: 88 MMHG | SYSTOLIC BLOOD PRESSURE: 134 MMHG | RESPIRATION RATE: 16 BRPM | BODY MASS INDEX: 30.3 KG/M2

## 2023-10-30 DIAGNOSIS — E11.65 TYPE 2 DIABETES MELLITUS WITH HYPERGLYCEMIA, WITHOUT LONG-TERM CURRENT USE OF INSULIN (H): ICD-10-CM

## 2023-10-30 DIAGNOSIS — I42.9 SECONDARY CARDIOMYOPATHY (H): Primary | ICD-10-CM

## 2023-10-30 DIAGNOSIS — I63.9 CEREBROVASCULAR ACCIDENT (CVA), UNSPECIFIED MECHANISM (H): ICD-10-CM

## 2023-10-30 DIAGNOSIS — I25.84 CORONARY ARTERY DISEASE DUE TO CALCIFIED CORONARY LESION: ICD-10-CM

## 2023-10-30 DIAGNOSIS — I25.10 CORONARY ARTERY DISEASE DUE TO CALCIFIED CORONARY LESION: ICD-10-CM

## 2023-10-30 PROCEDURE — 99204 OFFICE O/P NEW MOD 45 MIN: CPT | Performed by: INTERNAL MEDICINE

## 2023-10-30 RX ORDER — ASPIRIN 81 MG/1
81 TABLET, CHEWABLE ORAL DAILY
COMMUNITY
Start: 2023-10-30 | End: 2023-11-03

## 2023-10-30 RX ORDER — LOPERAMIDE HCL 2 MG
2 CAPSULE ORAL
COMMUNITY
Start: 2023-07-05

## 2023-10-30 RX ORDER — CARVEDILOL 6.25 MG/1
6.25 TABLET ORAL 2 TIMES DAILY WITH MEALS
Qty: 60 TABLET | Refills: 11 | Status: SHIPPED | OUTPATIENT
Start: 2023-10-30 | End: 2023-11-03

## 2023-10-30 NOTE — LETTER
10/30/2023    Carolyne Anderson MD  [address]  [place] [address]    RE: Tiffany Her       Dear Colleague,     I had the pleasure of seeing Tiffany Lenz in the [facility].    [facility]   [address]  [place] [address]   [email or web address]   OFFICE: [phone]     CARDIOLOGY CLINIC NOTE     Thank you, Carolyne Macias, for asking the [facility] team to see Mr. Tiffany Lenz to evaluate New Patient (cardiomyopathy)         Assessment/Recommendations   Assessment:    Cardiomyopathy with LVEF 45-50% and anterior wall motion abnormalities on echo.  Coronary artery disease with severe coronary calcifications on CT and reported symptoms concerning for an MI over 3 months ago. With his   AAA measuring up to 5.4 cm without rupture.  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  DMII, not on insulin, uncontrolled and complicated by vascular disease.    Plan:  Cardiac MRI with contrast and stress to evaluate coronary blood flow and for viability. If myocardium is mostly non-viabile then would forego coronary angiogram and treat medically. If there is viability, then revascularization would be reasonable.   Add aspirin 81 mg daily  Add carvedilol 6.25 mg BID  Follow up with ALBINA in 6-8 weeks and with me in 6 months.    An ipad based professional  was used for today's encounter.         History of Present Illness   Mr. Tiffany Lenz is a 52 year old male who presents for evaluation of a cardiomyopathy.    Mr. Lenz has an extensive vascular history including a descending thoracic aneurysm treated with a stent graft, known moderate AAA, and more recent large CVA in June and July 2023 resulting in a dense left hemiplegia.  During evaluation of his CVA this past summer he was found to have an ejection fraction of 40 to 45% with anterior wall motion abnormalities by  echocardiogram.  He is not in decompensated heart failure.  He does endorse symptoms of severe chest pain and shortness of breath that made him feel as though he was dying in June or July.  The symptoms resolved and have not returned.  He is currently confined to a wheelchair due to his left hemiparesis.    Other than noted above, Mr. Lenz denies any chest pain/pressure/tightness, shortness of breath at rest or with exertion, light headedness/dizziness, pre-syncope, syncope, lower extremity swelling, palpitations, paroxysmal nocturnal dyspnea (PND), or orthopnea.     Cardiac Problems and Cardiac Diagnostics     Most Recent Cardiac testing:  ECG dated 8/12/23 (personaly reviewed and interpreted): sinus rhythm with anterior and inferior infarct patterns    ECHO (report reviewed):   TTE 6/4/23  Interpretation Summary  Left ventricular function is decreased. The ejection fraction is 45-50% (mildly reduced).  Inferior wall hypokinesis is present.  Anteroseptal wall hypokinesis is present.  The right ventricle is normal size.  Global right ventricular function is normal.  Mild dilatation of the aorta is present.Ascending aorta 4.0 cm.  No significant valvular abnormalities present.     Previous study not available for comparison.         Medications  Allergies   Current Outpatient Medications   Medication Sig Dispense Refill    acetaminophen (TYLENOL) 325 MG tablet Take 2 tablets (650 mg) by mouth every 6 hours as needed for mild pain or other (and adjunct with moderate or severe pain or per patient request)      amLODIPine (NORVASC) 5 MG tablet Take 5 mg by mouth daily      aspirin (ASA) 325 MG tablet 1 tablet (325 mg) by Oral or Feeding Tube route daily 30 tablet 2    aspirin (ASA) 81 MG chewable tablet Take 1 tablet (81 mg) by mouth daily      atorvastatin (LIPITOR) 80 MG tablet Take 80 mg by mouth every evening      carvedilol (COREG) 6.25 MG tablet Take 1 tablet (6.25 mg) by mouth 2 times daily (with meals) 60 tablet  "11    cholecalciferol (VITAMIN D3) 25 mcg (1000 units) capsule Take 2,000 Units by mouth daily      FLUoxetine 20 MG tablet Take 60 mg by mouth daily      lisinopril (ZESTRIL) 20 MG tablet Take 1 tablet (20 mg) by mouth daily 90 tablet 0    loperamide (IMODIUM) 2 MG capsule Take 2 mg by mouth every 3 hours as needed      metFORMIN (GLUCOPHAGE XR) 500 MG 24 hr tablet Take 1,000 mg by mouth daily (with dinner)      senna-docusate (SENOKOT-S/PERICOLACE) 8.6-50 MG tablet Take 1 tablet by mouth 2 times daily as needed for constipation      blood glucose (NO BRAND SPECIFIED) test strip as directed AccuChek Guide Test Strips daily for 30 days (Patient not taking: Reported on 10/30/2023)      clopidogrel (PLAVIX) 75 MG tablet 1 tablet (75 mg) by Oral or Feeding Tube route daily (Patient not taking: Reported on 8/13/2023) 30 tablet 2    nicotine (NICODERM CQ) 14 MG/24HR 24 hr patch Place 1 patch onto the skin every 24 hours (Patient not taking: Reported on 10/30/2023)        No Known Allergies     Physical Examination Review of Systems   Vitals: /88 (BP Location: Left arm, Patient Position: Sitting, Cuff Size: Adult Regular)   Pulse 92   Resp 16   Ht 1.448 m (4' 9\")   SpO2 99%   BMI 30.30 kg/m    BMI= Body mass index is 30.3 kg/m .  Wt Readings from Last 3 Encounters:   08/15/23 63.5 kg (140 lb)   08/12/23 59 kg (130 lb)   06/16/23 60.3 kg (133 lb)       General: pleasant male. No acute distress. Sitting in wheelchair  HENT: external ears normal. Nares patent. Mucous membranes moist. Face is symmetric.  Eyes: perrla, extraocular muscles intact. No scleral icterus.   Neck: No JVD  Lungs: clear to auscultation  COR: regular rate and rhythm, No murmurs, rubs, or gallops  Abd: nondistended, BS present  Extrem: No edema.         Please refer above for cardiac ROS details.       Past History   Past Medical History:   Past Medical History:   Diagnosis Date    Aortic dissection (H)     Depression     Diabetes mellitus " type 2 in nonobese (H)     HLD (hyperlipidemia)     HTN (hypertension)     Pneumonia     Respiratory failure (H)         Past Surgical History:   Past Surgical History:   Procedure Laterality Date    ENDOVASCULAR REPAIR ANEURYSM ABDOMINAL AORTA N/A 2018    Procedure: ENDOVASCULAR REPAIR ANEURYSM ABDOMINAL AORTA;  thoracic endovascular repair of aorta distal to left subclavian artery.  Intravascular ultrasouns of aortic arch, thoracic aorta, and abdominal aorta.  ultrasound guided vascular accessx2, bilateral lower extremity arterial duplex limited study, percutaneous closure of endovascular access x1.  aorta catheterization x1.;  Surgeon: Renata Monique        Family History: No family history on file.     Social History:   Social History     Socioeconomic History    Marital status: Single     Spouse name: Not on file    Number of children: Not on file    Years of education: Not on file    Highest education level: Not on file   Occupational History    Not on file   Tobacco Use    Smoking status: Former     Types: Cigarettes     Quit date: 2023     Years since quittin.3    Smokeless tobacco: Never   Vaping Use    Vaping Use: Never used   Substance and Sexual Activity    Alcohol use: No    Drug use: No    Sexual activity: Not on file   Other Topics Concern    Not on file   Social History Narrative    No kids     Social Determinants of Health     Financial Resource Strain: Not on file   Food Insecurity: Not on file   Transportation Needs: Not on file   Physical Activity: Not on file   Stress: Not on file   Social Connections: Not on file   Interpersonal Safety: Not on file   Housing Stability: Not on file            Lab Results    Chemistry/lipid CBC Cardiac Enzymes/BNP/TSH/INR   Lab Results   Component Value Date    CHOL 185 2023    HDL 29 (L) 2023    TRIG 219 (H) 2023    BUN 6.7 08/15/2023     08/15/2023    CO2 23 08/15/2023    Lab Results   Component Value Date    WBC 8.5 08/15/2023     HGB 14.1 08/15/2023    HCT 42.3 08/15/2023    MCV 93 08/15/2023     08/15/2023    Lab Results   Component Value Date    TROPONINI 0.44 (HH) 06/04/2023    TSH 1.83 06/03/2023    INR 1.08 08/12/2023                Thank you for allowing me to participate in the care of your patient.      Sincerely,     René Davis MD     [facility]  cc:   No referring provider defined for this encounter.

## 2023-10-30 NOTE — PROGRESS NOTES
[facility]   [address]  [place] [address]   [email or web address]   OFFICE: [phone]     CARDIOLOGY CLINIC NOTE     Thank you, Carolyne Macias, for asking the [facility] Heart Care team to see Mr. Tfifany Lenz to evaluate New Patient (cardiomyopathy)         Assessment/Recommendations   Assessment:    Cardiomyopathy with LVEF 45-50% and anterior wall motion abnormalities on echo.  Coronary artery disease with severe coronary calcifications on CT and reported symptoms concerning for an MI over 3 months ago. With his   AAA measuring up to 5.4 cm without rupture.  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  DMII, not on insulin, uncontrolled and complicated by vascular disease.    Plan:  Cardiac MRI with contrast and stress to evaluate coronary blood flow and for viability. If myocardium is mostly non-viabile then would forego coronary angiogram and treat medically. If there is viability, then revascularization would be reasonable.   Add aspirin 81 mg daily  Add carvedilol 6.25 mg BID  Follow up with ALBINA in 6-8 weeks and with me in 6 months.    An ipad based professional  was used for today's encounter.         History of Present Illness   Mr. Tiffany Lenz is a 52 year old male who presents for evaluation of a cardiomyopathy.    Mr. Lenz has an extensive vascular history including a descending thoracic aneurysm treated with a stent graft, known moderate AAA, and more recent large CVA in June and July 2023 resulting in a dense left hemiplegia.  During evaluation of his CVA this past summer he was found to have an ejection fraction of 40 to 45% with anterior wall motion abnormalities by echocardiogram.  He is not in decompensated heart failure.  He does endorse symptoms of severe chest pain and shortness of breath that made him feel as though he was dying in June or July.  The  symptoms resolved and have not returned.  He is currently confined to a wheelchair due to his left hemiparesis.    Other than noted above, Mr. Lenz denies any chest pain/pressure/tightness, shortness of breath at rest or with exertion, light headedness/dizziness, pre-syncope, syncope, lower extremity swelling, palpitations, paroxysmal nocturnal dyspnea (PND), or orthopnea.     Cardiac Problems and Cardiac Diagnostics     Most Recent Cardiac testing:  ECG dated 8/12/23 (personaly reviewed and interpreted): sinus rhythm with anterior and inferior infarct patterns    ECHO (report reviewed):   TTE 6/4/23  Interpretation Summary  Left ventricular function is decreased. The ejection fraction is 45-50% (mildly reduced).  Inferior wall hypokinesis is present.  Anteroseptal wall hypokinesis is present.  The right ventricle is normal size.  Global right ventricular function is normal.  Mild dilatation of the aorta is present.Ascending aorta 4.0 cm.  No significant valvular abnormalities present.     Previous study not available for comparison.         Medications  Allergies   Current Outpatient Medications   Medication Sig Dispense Refill    acetaminophen (TYLENOL) 325 MG tablet Take 2 tablets (650 mg) by mouth every 6 hours as needed for mild pain or other (and adjunct with moderate or severe pain or per patient request)      amLODIPine (NORVASC) 5 MG tablet Take 5 mg by mouth daily      aspirin (ASA) 325 MG tablet 1 tablet (325 mg) by Oral or Feeding Tube route daily 30 tablet 2    aspirin (ASA) 81 MG chewable tablet Take 1 tablet (81 mg) by mouth daily      atorvastatin (LIPITOR) 80 MG tablet Take 80 mg by mouth every evening      carvedilol (COREG) 6.25 MG tablet Take 1 tablet (6.25 mg) by mouth 2 times daily (with meals) 60 tablet 11    cholecalciferol (VITAMIN D3) 25 mcg (1000 units) capsule Take 2,000 Units by mouth daily      FLUoxetine 20 MG tablet Take 60 mg by mouth daily      lisinopril (ZESTRIL) 20 MG tablet Take  "1 tablet (20 mg) by mouth daily 90 tablet 0    loperamide (IMODIUM) 2 MG capsule Take 2 mg by mouth every 3 hours as needed      metFORMIN (GLUCOPHAGE XR) 500 MG 24 hr tablet Take 1,000 mg by mouth daily (with dinner)      senna-docusate (SENOKOT-S/PERICOLACE) 8.6-50 MG tablet Take 1 tablet by mouth 2 times daily as needed for constipation      blood glucose (NO BRAND SPECIFIED) test strip as directed AccuChek Guide Test Strips daily for 30 days (Patient not taking: Reported on 10/30/2023)      clopidogrel (PLAVIX) 75 MG tablet 1 tablet (75 mg) by Oral or Feeding Tube route daily (Patient not taking: Reported on 8/13/2023) 30 tablet 2    nicotine (NICODERM CQ) 14 MG/24HR 24 hr patch Place 1 patch onto the skin every 24 hours (Patient not taking: Reported on 10/30/2023)        No Known Allergies     Physical Examination Review of Systems   Vitals: /88 (BP Location: Left arm, Patient Position: Sitting, Cuff Size: Adult Regular)   Pulse 92   Resp 16   Ht 1.448 m (4' 9\")   SpO2 99%   BMI 30.30 kg/m    BMI= Body mass index is 30.3 kg/m .  Wt Readings from Last 3 Encounters:   08/15/23 63.5 kg (140 lb)   08/12/23 59 kg (130 lb)   06/16/23 60.3 kg (133 lb)       General: pleasant male. No acute distress. Sitting in wheelchair  HENT: external ears normal. Nares patent. Mucous membranes moist. Face is symmetric.  Eyes: perrla, extraocular muscles intact. No scleral icterus.   Neck: No JVD  Lungs: clear to auscultation  COR: regular rate and rhythm, No murmurs, rubs, or gallops  Abd: nondistended, BS present  Extrem: No edema.         Please refer above for cardiac ROS details.       Past History   Past Medical History:   Past Medical History:   Diagnosis Date    Aortic dissection (H)     Depression     Diabetes mellitus type 2 in nonobese (H)     HLD (hyperlipidemia)     HTN (hypertension)     Pneumonia     Respiratory failure (H)         Past Surgical History:   Past Surgical History:   Procedure Laterality Date "    ENDOVASCULAR REPAIR ANEURYSM ABDOMINAL AORTA N/A 2018    Procedure: ENDOVASCULAR REPAIR ANEURYSM ABDOMINAL AORTA;  thoracic endovascular repair of aorta distal to left subclavian artery.  Intravascular ultrasouns of aortic arch, thoracic aorta, and abdominal aorta.  ultrasound guided vascular accessx2, bilateral lower extremity arterial duplex limited study, percutaneous closure of endovascular access x1.  aorta catheterization x1.;  Surgeon: Renata Monique        Family History: No family history on file.     Social History:   Social History     Socioeconomic History    Marital status: Single     Spouse name: Not on file    Number of children: Not on file    Years of education: Not on file    Highest education level: Not on file   Occupational History    Not on file   Tobacco Use    Smoking status: Former     Types: Cigarettes     Quit date: 2023     Years since quittin.3    Smokeless tobacco: Never   Vaping Use    Vaping Use: Never used   Substance and Sexual Activity    Alcohol use: No    Drug use: No    Sexual activity: Not on file   Other Topics Concern    Not on file   Social History Narrative    No kids     Social Determinants of Health     Financial Resource Strain: Not on file   Food Insecurity: Not on file   Transportation Needs: Not on file   Physical Activity: Not on file   Stress: Not on file   Social Connections: Not on file   Interpersonal Safety: Not on file   Housing Stability: Not on file            Lab Results    Chemistry/lipid CBC Cardiac Enzymes/BNP/TSH/INR   Lab Results   Component Value Date    CHOL 185 2023    HDL 29 (L) 2023    TRIG 219 (H) 2023    BUN 6.7 08/15/2023     08/15/2023    CO2 23 08/15/2023    Lab Results   Component Value Date    WBC 8.5 08/15/2023    HGB 14.1 08/15/2023    HCT 42.3 08/15/2023    MCV 93 08/15/2023     08/15/2023    Lab Results   Component Value Date    TROPONINI 0.44 (HH) 2023    TSH 1.83 2023    INR 1.08  08/12/2023

## 2023-11-01 ENCOUNTER — TELEPHONE (OUTPATIENT)
Dept: CARDIOLOGY | Facility: CLINIC | Age: 53
End: 2023-11-01
Payer: COMMERCIAL

## 2023-11-01 DIAGNOSIS — I25.84 CORONARY ARTERY DISEASE DUE TO CALCIFIED CORONARY LESION: ICD-10-CM

## 2023-11-01 DIAGNOSIS — I42.9 SECONDARY CARDIOMYOPATHY (H): ICD-10-CM

## 2023-11-01 DIAGNOSIS — I25.10 CORONARY ARTERY DISEASE DUE TO CALCIFIED CORONARY LESION: ICD-10-CM

## 2023-11-01 NOTE — TELEPHONE ENCOUNTER
Faxed orders-    From: René Davis MD  Sent: 11/1/2023   4:34 PM CDT  To: Enid Layne    The documentation from MR. Her's care facility did not list aspirin on his medication list so I started aspirin 81 mg daily. 81 mg of aspirin should be sufficient.  JKH    ----- Message -----  From: Enid Layne  Sent: 11/1/2023   2:09 PM CDT  To: René Davis MD    ----- Message from Enid Layne sent at 11/1/2023  2:09 PM CDT -----  Hi Dr. Davis,    The patient is currently prescribed Aspirin 325 mg, 81 mg was added. Please clarify the dosage-thanks.

## 2023-11-01 NOTE — TELEPHONE ENCOUNTER
Spoke with Isis, RN from [facility],    Will clarify dose of aspirin with Dr. Davis. Writer requested to speak with nursing supervisor and/or . A message was left on 10/30 after appt with Dr. Davis. Pt was left at clinic with no return transportation scheduled. Multiple messages were left with facility and transportation company. No return calls received from facility. Received confirmation from transportation company that there was not an assigned  for return but that their last ride of the day was 6 pm.     Pt is a [place] transfer due to physical limitations, outpatient cardiology clinic not able to assist in providing adls for this Pt.  Writer stayed with Pt until transportation received. Approx 14:15 pm Pt was picked up from clinic. -LEATHA            ----- Message from Erica Nunn sent at 11/1/2023  1:35 PM CDT -----  General phone call:    Caller: Isis Nurse from [facility]  Primary cardiologist: ROBERT  Detailed reason for call: Dr. Carreno recd a page that she didn't understand about this patient.  She called me and I saw pt saw ROBERT 10/30.  Dr. Carreno didn't know why she (Rashard Ornelas) was paged for this person so I told her I would call and research.  She said the page said something about calling Isis at the [facility] on this pt.  I called the [facility] reflected on a cancelled TSB appt and asked for the floor of the patient.  Isis answered the phone and I asked her what number she called.  She said she called [phone] and spoke with Kassie who said she would page Dr. Carreno who was on call for Dr. Kenyon?      Enid - can you call Isis back because she needs orders faxed over for the patient to be on Carvdedilol 6.25 mg bid and ASA 81 mg. Because the patient is on  mg. Every day.  Fax [phone]              Best phone number: [phone]  Best time to contact: any  Ok to leave a detailedmessage? yes  Device? no    Additional Info:

## 2023-11-03 RX ORDER — ASPIRIN 81 MG/1
81 TABLET, CHEWABLE ORAL DAILY
Qty: 90 TABLET | Refills: 3 | Status: SHIPPED | OUTPATIENT
Start: 2023-11-03

## 2023-11-03 RX ORDER — CARVEDILOL 6.25 MG/1
6.25 TABLET ORAL 2 TIMES DAILY WITH MEALS
Qty: 60 TABLET | Refills: 11 | Status: SHIPPED | OUTPATIENT
Start: 2023-11-03

## 2023-11-08 ENCOUNTER — TRANSFERRED RECORDS (OUTPATIENT)
Dept: HEALTH INFORMATION MANAGEMENT | Facility: CLINIC | Age: 53
End: 2023-11-08
Payer: COMMERCIAL

## 2023-11-08 ENCOUNTER — MEDICAL CORRESPONDENCE (OUTPATIENT)
Dept: HEALTH INFORMATION MANAGEMENT | Facility: CLINIC | Age: 53
End: 2023-11-08
Payer: COMMERCIAL

## 2023-11-13 DIAGNOSIS — I63.89 AC ISCH MULTI VASC TERRITORIES STROKE (H): Primary | ICD-10-CM

## 2023-11-13 DIAGNOSIS — I63.9 CEREBROVASCULAR ACCIDENT (CVA), UNSPECIFIED MECHANISM (H): ICD-10-CM

## 2023-12-21 ENCOUNTER — HOSPITAL ENCOUNTER (OUTPATIENT)
Dept: MRI IMAGING | Facility: HOSPITAL | Age: 53
Discharge: HOME | End: 2023-12-21
Attending: INTERNAL MEDICINE
Payer: COMMERCIAL

## 2023-12-21 VITALS — SYSTOLIC BLOOD PRESSURE: 143 MMHG | DIASTOLIC BLOOD PRESSURE: 82 MMHG | HEART RATE: 90 BPM | OXYGEN SATURATION: 97 %

## 2023-12-21 DIAGNOSIS — I25.10 CORONARY ARTERY DISEASE DUE TO CALCIFIED CORONARY LESION: ICD-10-CM

## 2023-12-21 DIAGNOSIS — I71.019 AORTIC DISSECTION, THORACIC (H): Primary | ICD-10-CM

## 2023-12-21 DIAGNOSIS — I25.84 CORONARY ARTERY DISEASE DUE TO CALCIFIED CORONARY LESION: ICD-10-CM

## 2023-12-21 DIAGNOSIS — I42.9 SECONDARY CARDIOMYOPATHY (H): ICD-10-CM

## 2023-12-21 LAB
ATRIAL RATE - MUSE: 69 BPM
ATRIAL RATE - MUSE: 88 BPM
DIASTOLIC BLOOD PRESSURE - MUSE: NORMAL MMHG
DIASTOLIC BLOOD PRESSURE - MUSE: NORMAL MMHG
INTERPRETATION ECG - MUSE: NORMAL
INTERPRETATION ECG - MUSE: NORMAL
P AXIS - MUSE: 17 DEGREES
P AXIS - MUSE: 42 DEGREES
PR INTERVAL - MUSE: 188 MS
PR INTERVAL - MUSE: 192 MS
QRS DURATION - MUSE: 78 MS
QRS DURATION - MUSE: 78 MS
QT - MUSE: 390 MS
QT - MUSE: 428 MS
QTC - MUSE: 458 MS
QTC - MUSE: 471 MS
R AXIS - MUSE: -11 DEGREES
R AXIS - MUSE: -13 DEGREES
SYSTOLIC BLOOD PRESSURE - MUSE: NORMAL MMHG
SYSTOLIC BLOOD PRESSURE - MUSE: NORMAL MMHG
T AXIS - MUSE: 67 DEGREES
T AXIS - MUSE: 72 DEGREES
VENTRICULAR RATE- MUSE: 69 BPM
VENTRICULAR RATE- MUSE: 88 BPM

## 2023-12-21 PROCEDURE — 93005 ELECTROCARDIOGRAM TRACING: CPT

## 2023-12-21 PROCEDURE — 75563 CARD MRI W/STRESS IMG & DYE: CPT

## 2023-12-21 PROCEDURE — 93010 ELECTROCARDIOGRAM REPORT: CPT | Mod: 59 | Performed by: INTERNAL MEDICINE

## 2023-12-21 PROCEDURE — 93016 CV STRESS TEST SUPVJ ONLY: CPT | Performed by: INTERNAL MEDICINE

## 2023-12-21 PROCEDURE — 255N000002 HC RX 255 OP 636: Mod: JZ | Performed by: INTERNAL MEDICINE

## 2023-12-21 PROCEDURE — A9585 GADOBUTROL INJECTION: HCPCS | Mod: JZ | Performed by: INTERNAL MEDICINE

## 2023-12-21 PROCEDURE — 93018 CV STRESS TEST I&R ONLY: CPT | Performed by: INTERNAL MEDICINE

## 2023-12-21 PROCEDURE — 75563 CARD MRI W/STRESS IMG & DYE: CPT | Mod: 26 | Performed by: INTERNAL MEDICINE

## 2023-12-21 PROCEDURE — 999N000122 MR MYOCARDIUM  OVERREAD

## 2023-12-21 PROCEDURE — 250N000011 HC RX IP 250 OP 636: Performed by: INTERNAL MEDICINE

## 2023-12-21 RX ORDER — REGADENOSON 0.08 MG/ML
0.4 INJECTION, SOLUTION INTRAVENOUS ONCE
Status: COMPLETED | OUTPATIENT
Start: 2023-12-21 | End: 2023-12-21

## 2023-12-21 RX ORDER — GADOBUTROL 604.72 MG/ML
14 INJECTION INTRAVENOUS ONCE
Status: COMPLETED | OUTPATIENT
Start: 2023-12-21 | End: 2023-12-21

## 2023-12-21 RX ORDER — AMINOPHYLLINE 25 MG/ML
50 INJECTION, SOLUTION INTRAVENOUS
Status: DISCONTINUED | OUTPATIENT
Start: 2023-12-21 | End: 2023-12-21 | Stop reason: HOSPADM

## 2023-12-21 RX ADMIN — GADOBUTROL 14 ML: 604.72 INJECTION INTRAVENOUS at 10:33

## 2023-12-21 RX ADMIN — REGADENOSON 0.4 MG: 0.08 INJECTION, SOLUTION INTRAVENOUS at 10:35

## (undated) DEVICE — DRAPE CONVERTORS U-DRAPE 60X72" 8476

## (undated) DEVICE — INTRO SHEATH BRITE TIP 9FRX11CM 401-911M

## (undated) DEVICE — DRAPE SHEET REV FOLD 3/4 9349

## (undated) DEVICE — DRAPE CV SPLIT II 147X106" 9158

## (undated) DEVICE — INTRO SHEATH BRITE TIP 11FRX11CM 401-111M

## (undated) DEVICE — INTRO SHEATH MICRO PLATINUM TIP 4FRX40CM 7274

## (undated) DEVICE — Device

## (undated) DEVICE — SYR 10ML FINGER CONTROL W/O NDL 309695

## (undated) DEVICE — COVER ULTRASOUND PROBE W/GEL FLEXI-FEEL 6"X58" LF  25-FF658

## (undated) DEVICE — GUIDEWIRE TERUMO .035X260CM EXCHANGE ANG GS3509

## (undated) DEVICE — SOL NACL 0.9% INJ 1000ML BAG 2B1324X

## (undated) DEVICE — SU DERMABOND ADVANCED .7ML DNX12

## (undated) DEVICE — COVER CAMERA VIDEO LASER 9X96" 04-CC227

## (undated) DEVICE — KIT MICRO-INTRODUCER STIFFEN 4FR 7274V

## (undated) DEVICE — BLADE CLIPPER SGL USE 9680

## (undated) DEVICE — WIRE GUIDE LUNDERQUIST 0.035"X260CM DBL CVD

## (undated) DEVICE — CLOSURE DEVICE 6FR VASC PROGLIDE MEDICATED SUTURE 12673-03

## (undated) DEVICE — COVER NEOPROBE SOFTFLEX 5X96" W/BANDS 20-PC596

## (undated) DEVICE — GLOVE PROTEXIS BLUE W/NEU-THERA 7.5  2D73EB75

## (undated) DEVICE — CATH IMAGING ULTRASOUND VISIONS PV.035 7FRX90CM 88901

## (undated) DEVICE — NDL PERC ENTRY THINWALL 18GA 7.0" G00166

## (undated) DEVICE — RAD CATH PIGTAIL 5FRX100CM SIZING

## (undated) DEVICE — WIPE PREMOIST CLEANSING WASHCLOTHS 7988

## (undated) DEVICE — DRAPE C-ARM W/STRAPS 42X72" 07-CA104

## (undated) DEVICE — CATH ANGIO TORCON BECAON TIP JB-1 5FRX65CM G11208

## (undated) DEVICE — NDL BLUNT 18GA 1" W/O FILTER 305181

## (undated) DEVICE — TUBING MEDRAD 48" HIGH PRESSURE MX694

## (undated) DEVICE — INTRO SHEATH BRITE TIP 6FRX11CM 401-611M

## (undated) DEVICE — WIRE GUIDE 0.035"X260CM CEREBRAL BENTSON STR TSFB-35-260-BH

## (undated) DEVICE — CATH SOS OMNI-2 5FRX80CM BRAID 10712002

## (undated) DEVICE — DEFIB PADPRO CONMED ADULT/CHILD 2001Z-C

## (undated) DEVICE — SYR MEDRAD 150ML MARK 7 ARTERION ART700SYR

## (undated) DEVICE — CATH OMNIFLUSH 5FRX70CM SIZING 13709702

## (undated) DEVICE — CATH ANGIO SELECTIVE SOFT-VU 5FRX100CM JB1 10734102

## (undated) DEVICE — INTRO SHEATH BRITE TIP 7FRX11CM 401-711M

## (undated) DEVICE — GUIDEWIRE TERUMO .035X260CM ANG EXCHANGE GR3509

## (undated) DEVICE — CATH TRAY FOLEY SURESTEP 16FR W/URNE MTR STLK LATEX A303316A

## (undated) DEVICE — ESU GROUND PAD ADULT REM W/15' CORD E7507DB

## (undated) DEVICE — SYR 10ML LL W/O NDL 302995

## (undated) DEVICE — NDL CANNULA VASCULAR SOLO 21GX70MM 1187-4F070

## (undated) DEVICE — DRAPE U SPLIT 74X120" 29440

## (undated) DEVICE — GUIDEWIRE TERUMO .035X180 ANG GR3508

## (undated) DEVICE — DECANTER BAG 2002S

## (undated) DEVICE — WIPES FOLEY CARE SURESTEP PROVON DFC100

## (undated) DEVICE — LINEN TOWEL PACK X30 5481

## (undated) DEVICE — PREP CHLORAPREP 26ML TINTED ORANGE  260815

## (undated) DEVICE — GLOVE PROTEXIS W/NEU-THERA 7.0  2D73TE70

## (undated) DEVICE — SUTURE BOOTS 051003PBX

## (undated) DEVICE — DRAPE IOBAN INCISE 23X17" 6650EZ

## (undated) RX ORDER — LIDOCAINE HYDROCHLORIDE 20 MG/ML
INJECTION, SOLUTION EPIDURAL; INFILTRATION; INTRACAUDAL; PERINEURAL
Status: DISPENSED
Start: 2018-02-13

## (undated) RX ORDER — FENTANYL CITRATE 50 UG/ML
INJECTION, SOLUTION INTRAMUSCULAR; INTRAVENOUS
Status: DISPENSED
Start: 2018-02-13

## (undated) RX ORDER — PROPOFOL 10 MG/ML
INJECTION, EMULSION INTRAVENOUS
Status: DISPENSED
Start: 2018-02-13

## (undated) RX ORDER — CEFAZOLIN SODIUM 2 G/100ML
INJECTION, SOLUTION INTRAVENOUS
Status: DISPENSED
Start: 2018-02-13

## (undated) RX ORDER — GLYCOPYRROLATE 0.2 MG/ML
INJECTION, SOLUTION INTRAMUSCULAR; INTRAVENOUS
Status: DISPENSED
Start: 2018-02-13

## (undated) RX ORDER — HEPARIN SODIUM 1000 [USP'U]/ML
INJECTION, SOLUTION INTRAVENOUS; SUBCUTANEOUS
Status: DISPENSED
Start: 2018-02-13